# Patient Record
Sex: FEMALE | Race: WHITE | NOT HISPANIC OR LATINO | Employment: FULL TIME | URBAN - METROPOLITAN AREA
[De-identification: names, ages, dates, MRNs, and addresses within clinical notes are randomized per-mention and may not be internally consistent; named-entity substitution may affect disease eponyms.]

---

## 2018-02-28 ENCOUNTER — CLINICAL SUPPORT (OUTPATIENT)
Dept: OTHER | Facility: CLINIC | Age: 38
End: 2018-02-28
Payer: COMMERCIAL

## 2018-02-28 DIAGNOSIS — Z00.8 HEALTH EXAMINATION IN POPULATION SURVEYS: Primary | ICD-10-CM

## 2018-02-28 PROCEDURE — 99401 PREV MED CNSL INDIV APPRX 15: CPT | Mod: S$GLB,,, | Performed by: INTERNAL MEDICINE

## 2018-02-28 PROCEDURE — 80061 LIPID PANEL: CPT | Mod: QW,S$GLB,, | Performed by: INTERNAL MEDICINE

## 2018-02-28 PROCEDURE — 82947 ASSAY GLUCOSE BLOOD QUANT: CPT | Mod: QW,S$GLB,, | Performed by: INTERNAL MEDICINE

## 2018-03-01 VITALS
HEIGHT: 66 IN | BODY MASS INDEX: 30.86 KG/M2 | DIASTOLIC BLOOD PRESSURE: 92 MMHG | WEIGHT: 192 LBS | SYSTOLIC BLOOD PRESSURE: 133 MMHG

## 2018-03-01 LAB
GLUCOSE SERPL-MCNC: NORMAL MG/DL (ref 60–140)
POC CHOLESTEROL, HDL: 55 MG/DL (ref 40–?)
POC CHOLESTEROL, LDL: 152 MG/DL (ref ?–160)
POC CHOLESTEROL, TOTAL: 238 MG/DL (ref ?–240)
POC GLUCOSE FASTING: 95 MG/DL (ref 60–110)
POC TOTAL CHOLESTEROL / HDL RATIO: 4.33 (ref ?–6)
POC TRIGLYCERIDES: 153 MG/DL (ref ?–160)

## 2018-04-15 ENCOUNTER — PATIENT MESSAGE (OUTPATIENT)
Dept: INTERNAL MEDICINE | Facility: CLINIC | Age: 38
End: 2018-04-15

## 2018-04-16 ENCOUNTER — OFFICE VISIT (OUTPATIENT)
Dept: INTERNAL MEDICINE | Facility: CLINIC | Age: 38
End: 2018-04-16
Payer: COMMERCIAL

## 2018-04-16 ENCOUNTER — HOSPITAL ENCOUNTER (OUTPATIENT)
Dept: RADIOLOGY | Facility: HOSPITAL | Age: 38
Discharge: HOME | End: 2018-04-16
Attending: PHYSICIAN ASSISTANT
Payer: COMMERCIAL

## 2018-04-16 VITALS
OXYGEN SATURATION: 98 % | HEART RATE: 96 BPM | DIASTOLIC BLOOD PRESSURE: 80 MMHG | SYSTOLIC BLOOD PRESSURE: 106 MMHG | WEIGHT: 187.25 LBS | HEIGHT: 65 IN | BODY MASS INDEX: 31.2 KG/M2 | RESPIRATION RATE: 16 BRPM | TEMPERATURE: 99 F

## 2018-04-16 DIAGNOSIS — M75.21 BICEPS TENDONITIS ON RIGHT: ICD-10-CM

## 2018-04-16 DIAGNOSIS — M75.21 BICEPS TENDONITIS ON RIGHT: Primary | ICD-10-CM

## 2018-04-16 DIAGNOSIS — F41.8 MIXED ANXIETY DEPRESSIVE DISORDER: ICD-10-CM

## 2018-04-16 PROBLEM — E66.811 OBESITY, CLASS I, BMI 30-34.9: Status: ACTIVE | Noted: 2018-04-16

## 2018-04-16 PROBLEM — E66.9 OBESITY, CLASS I, BMI 30-34.9: Status: ACTIVE | Noted: 2018-04-16

## 2018-04-16 PROCEDURE — 99999 PR PBB SHADOW E&M-EST. PATIENT-LVL IV: CPT | Mod: PBBFAC,,, | Performed by: PHYSICIAN ASSISTANT

## 2018-04-16 PROCEDURE — 73030 X-RAY EXAM OF SHOULDER: CPT | Mod: 26,RT,, | Performed by: RADIOLOGY

## 2018-04-16 PROCEDURE — 73030 X-RAY EXAM OF SHOULDER: CPT | Mod: TC,FY,PO,RT

## 2018-04-16 PROCEDURE — 99203 OFFICE O/P NEW LOW 30 MIN: CPT | Mod: S$GLB,,, | Performed by: PHYSICIAN ASSISTANT

## 2018-04-16 RX ORDER — MELOXICAM 15 MG/1
15 TABLET ORAL DAILY
Qty: 20 TABLET | Refills: 3 | Status: SHIPPED | OUTPATIENT
Start: 2018-04-16 | End: 2018-05-31

## 2018-04-16 RX ORDER — VENLAFAXINE HYDROCHLORIDE 37.5 MG/1
37.5 CAPSULE, EXTENDED RELEASE ORAL DAILY
Qty: 30 CAPSULE | Refills: 1 | Status: SHIPPED | OUTPATIENT
Start: 2018-04-16 | End: 2018-06-01

## 2018-04-16 RX ORDER — VENLAFAXINE HYDROCHLORIDE 37.5 MG/1
1 CAPSULE, EXTENDED RELEASE ORAL DAILY
Refills: 3 | COMMUNITY
Start: 2018-02-03 | End: 2018-04-16 | Stop reason: SDUPTHER

## 2018-04-16 RX ORDER — VENLAFAXINE HYDROCHLORIDE 150 MG/1
150 CAPSULE, EXTENDED RELEASE ORAL DAILY
Refills: 1 | COMMUNITY
Start: 2018-02-19 | End: 2018-04-16 | Stop reason: SDUPTHER

## 2018-04-16 RX ORDER — IBUPROFEN 200 MG
200 TABLET ORAL EVERY 6 HOURS PRN
COMMUNITY
End: 2018-04-16 | Stop reason: ALTCHOICE

## 2018-04-16 RX ORDER — VENLAFAXINE HYDROCHLORIDE 150 MG/1
150 CAPSULE, EXTENDED RELEASE ORAL DAILY
Qty: 30 CAPSULE | Refills: 1 | Status: SHIPPED | OUTPATIENT
Start: 2018-04-16 | End: 2018-05-31 | Stop reason: SDUPTHER

## 2018-04-16 NOTE — PROGRESS NOTES
Subjective:       Patient ID: Francis Stafford is a 37 y.o.W/ female.    Chief Complaint: Shoulder Pain (Bl, R worse) and Medication Refill    HPI         She comes in by herself today and has the above problem.  She has not been able to raise her right arm offer side for about 3 days.  She has been having problems with her right shoulder now since before November.  In December she went skiing and slipped and hurt her right shoulder.  She never saw anybody in urgent care or ER because of this.  It seems to hurt in the anterior portion of her shoulder.  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  She is hoping that if she takes 1 or 2 days off from work and rests her arm the pain will go away.  She's had no paresthesias or muscle fasciculations to her right upper extremity at all.  There have been no problems with her neck.  There is no grating or crepitance noted when she tries to move her right upper arm and shoulder.  There has been no swelling or redness or increased heat to the right shoulder.        She has tried anywhere from 1-4 OTC ibuprofen at a time and they just don't seem to be helping that much.  She has not applied any ice or heat to the shoulder.  She does not carry any heavy weights such as children on her side or heavy handbags.    Review of Systems    Otherwise negative concerning the ORTHOPEDIC, MUSCULOSKELETAL, RHEUMATOLOGIC system review.    Objective:      Physical Exam    She preferred keeping her arm underside and just barely raised her forearm enough to shake hands on my entry to the room.  SHOULDERS: The left shoulder has FROM and no limitations whatsoever.  She also has no palpable pain in the posterior, lateral, or anterior compartments of the shoulder.  There is no pain with biceps pluck.  On the right shoulder, she can raise her arm about 80% overhead by using her left arm to raise it.  Pluck of the biceps  is extremely painful about a 10/10 level of pain.  She can't put it behind her back in all because it hurts too much in the front.  Internal/external rotation is basically pain-free of the humeral head.  Trying to get her to put her hand behind her back and reach up to her shoulder blades is impossible.  She also cannot reach across even as high as breast level to try and reach her shoulder blade.   strength is strong and equal.  Her radial pulse is strong and equal at 2+.  Color and warmth of the extremities normal.    Assessment:       1. Biceps tendonitis on right    2. Mixed anxiety depressive disorder        Plan:     1.  Info sheet concerning right shoulder pain was given to the patient with home physical therapy outlined on the paper.  2.  Start Mobic 15 mg daily with food and discontinue the ibuprofen.  3.  X-ray right shoulder.  4.  Recheck in 7 days if no significant improvement.  We may need to have her go see orthopedics or physical therapy.

## 2018-04-19 ENCOUNTER — TELEPHONE (OUTPATIENT)
Dept: INTERNAL MEDICINE | Facility: CLINIC | Age: 38
End: 2018-04-19

## 2018-04-19 DIAGNOSIS — R93.6 ABNORMAL X-RAY OF EXTREMITY: ICD-10-CM

## 2018-04-19 DIAGNOSIS — M75.21 BICEPS TENDINITIS ON RIGHT: Primary | ICD-10-CM

## 2018-04-19 NOTE — TELEPHONE ENCOUNTER
Ref to Ortho pended for provider signature, appt sched TOMORROW 04/20/18 (see result note documentation, xray 04/16/18).

## 2018-04-19 NOTE — TELEPHONE ENCOUNTER
----- Message from Leslye Peralta MA sent at 4/17/2018  2:19 PM CDT -----  Contact: Primary Care  See below   ----- Message -----  From: Mg Seo MD  Sent: 4/17/2018   2:13 PM  To: Leslye Peralta MA    Sure we should see her    ----- Message -----  From: Leslye Peralta MA  Sent: 4/17/2018  11:19 AM  To: Mg Seo MD    Advise...    -AS  ----- Message -----  From: Lizeth Major MA  Sent: 4/17/2018   9:10 AM  To: Gabbi Holliday Staff    (Pt never seen by Ortho) Per NOLA Stubbs, could you have your Ortho provider take a look at pt's 04/16/18 xray and his visit note, and based on those results would they recommend that she sees an Ortho provider?      Thank you,    Lizeth Banuelos

## 2018-04-20 ENCOUNTER — OFFICE VISIT (OUTPATIENT)
Dept: ORTHOPEDICS | Facility: CLINIC | Age: 38
End: 2018-04-20
Payer: COMMERCIAL

## 2018-04-20 VITALS
DIASTOLIC BLOOD PRESSURE: 75 MMHG | SYSTOLIC BLOOD PRESSURE: 117 MMHG | HEART RATE: 82 BPM | BODY MASS INDEX: 32.29 KG/M2 | RESPIRATION RATE: 12 BRPM | WEIGHT: 189.13 LBS | HEIGHT: 64 IN

## 2018-04-20 DIAGNOSIS — M75.31 CALCIFIC TENDINITIS OF RIGHT SHOULDER: Primary | ICD-10-CM

## 2018-04-20 DIAGNOSIS — M25.511 ACUTE PAIN OF RIGHT SHOULDER: ICD-10-CM

## 2018-04-20 DIAGNOSIS — M75.21 BICEPS TENDINITIS OF RIGHT SHOULDER: ICD-10-CM

## 2018-04-20 DIAGNOSIS — M19.011 DJD OF RIGHT AC (ACROMIOCLAVICULAR) JOINT: ICD-10-CM

## 2018-04-20 PROCEDURE — 99203 OFFICE O/P NEW LOW 30 MIN: CPT | Mod: S$GLB,,, | Performed by: PHYSICIAN ASSISTANT

## 2018-04-20 PROCEDURE — 99999 PR PBB SHADOW E&M-EST. PATIENT-LVL III: CPT | Mod: PBBFAC,,, | Performed by: PHYSICIAN ASSISTANT

## 2018-04-20 NOTE — PROGRESS NOTES
Subjective:      Patient ID: Francis Stafford is a 37 y.o. female.    Chief Complaint: Pain of the Right Shoulder      HPI: Francis Stafford  is a 37 y.o. female who c/o Pain of the Right Shoulder   for duration of a year.  She denies a particular inciting injury, but goes on to tell me that she had a fall on her ski trip in December.  She had intermittent pain in the right shoulder prior to that, so she doesn't attribute her pain now to the fall in December.  She was particularly worse this past week.  Pain level presently is 3 out of 10.  It is intermittent and sharp in quality.  Alleviating factors include meloxicam.  She complains of associated stiffness.  Worsened with overhead motion.  She denies associated numbness and tingling.  She points anteriorly as to where the pain is located.    Review of Systems   Constitution: Negative for fever.   Cardiovascular: Negative for chest pain.   Respiratory: Negative for cough and shortness of breath.    Skin: Negative for rash.   Musculoskeletal: Positive for joint pain and stiffness. Negative for joint swelling.   Gastrointestinal: Negative for heartburn.   Neurological: Negative for headaches and numbness.         Objective:        General    Nursing note and vitals reviewed.  Constitutional: She is oriented to person, place, and time. She appears well-developed and well-nourished.   HENT:   Head: Normocephalic and atraumatic.   Eyes: EOM are normal.   Cardiovascular: Normal rate and regular rhythm.    Pulmonary/Chest: Effort normal.   Abdominal: Soft.   Neurological: She is alert and oriented to person, place, and time.   Psychiatric: She has a normal mood and affect. Her behavior is normal.         Right Shoulder Exam     Inspection/Observation   Swelling: absent  Bruising: absent  Scars: absent  Deformity: absent    Tenderness   The patient is tender to palpation of the biceps tendon.    Range of Motion   Active Abduction: normal   Passive Abduction: normal   Extension:  normal   Forward Flexion: abnormal   Forward Elevation: normal  Adduction: normal  External Rotation 0 degrees: normal   Internal Rotation 0 degrees: normal     Tests & Signs   Cross Arm: positive  Drop Arm: negative  Hawkin's test: negative  Impingement: negative  Rotator Cuff Painful Arc/Range: mild    Other   Sensation: normal    Muscle Strength   Right Upper Extremity   Shoulder Abduction: 5/5   Shoulder Internal Rotation: 5/5   Shoulder External Rotation: 5/5   Subscapularis: 5/5/5   Biceps: 5/5/5     Vascular Exam     Right Pulses      Radial:                    2+      Capillary Refill  Right Hand: normal capillary refill            Xray:   Right shoulder from 4/16/18 images and report were reviewed today.  I agree with the radiologist's interpretation.  No acute fracture or dislocation.  No significant AC joint arthropathy.  Prominent calcifications noted in the expected location of the rotator cuff most consistent with calcium hydroxyapatite deposition.    Assessment:       Encounter Diagnoses   Name Primary?    Calcific tendinitis of right shoulder Yes    Biceps tendinitis of right shoulder     Acute pain of right shoulder     DJD of right AC (acromioclavicular) joint           Plan:       Francis was seen today for pain.    Diagnoses and all orders for this visit:    Calcific tendinitis of right shoulder  -     Ambulatory Referral to Physical/Occupational Therapy    Biceps tendinitis of right shoulder  -     Ambulatory Referral to Physical/Occupational Therapy    Acute pain of right shoulder  -     Ambulatory Referral to Physical/Occupational Therapy    DJD of right AC (acromioclavicular) joint  -     Ambulatory Referral to Physical/Occupational Therapy     Francis comes in today for new problem.  She is a new patient.  She has calcific tendinitis and biceps tendinitis of the right shoulder them: However, she is much improved from earlier this week.  She'll continue taking meloxicam as needed.  I have  also given her an order to do several weeks of physical therapy.  Her pain level is 3 out of 10 today.  She is much improved, so I do not think she should have an injection.  I will see her back in the office in 6 weeks to reevaluate her progress.  She verbalizes understanding and agrees.    Follow-up in about 6 weeks (around 6/1/2018).          The patient understands, chooses and consents to this plan and accepts all   the risks which include but are not limited to the risks mentioned above.     Disclaimer: This note was prepared using a voice recognition system and is likely to have sound alike errors within the text.

## 2018-04-30 ENCOUNTER — CLINICAL SUPPORT (OUTPATIENT)
Dept: REHABILITATION | Facility: HOSPITAL | Age: 38
End: 2018-04-30
Attending: PHYSICIAN ASSISTANT
Payer: COMMERCIAL

## 2018-04-30 DIAGNOSIS — M19.011 ARTHRITIS OF RIGHT SHOULDER REGION: ICD-10-CM

## 2018-04-30 DIAGNOSIS — M75.31 CALCIFIC TENDINITIS OF RIGHT SHOULDER: Primary | ICD-10-CM

## 2018-04-30 DIAGNOSIS — M25.511 CHRONIC RIGHT SHOULDER PAIN: ICD-10-CM

## 2018-04-30 DIAGNOSIS — G89.29 CHRONIC RIGHT SHOULDER PAIN: ICD-10-CM

## 2018-04-30 DIAGNOSIS — M75.21 BICIPITAL TENDINITIS OF RIGHT SHOULDER: ICD-10-CM

## 2018-04-30 PROCEDURE — 97014 ELECTRIC STIMULATION THERAPY: CPT | Mod: PO | Performed by: OCCUPATIONAL THERAPIST

## 2018-04-30 PROCEDURE — 97165 OT EVAL LOW COMPLEX 30 MIN: CPT | Mod: PO | Performed by: OCCUPATIONAL THERAPIST

## 2018-04-30 PROCEDURE — 97110 THERAPEUTIC EXERCISES: CPT | Mod: PO | Performed by: OCCUPATIONAL THERAPIST

## 2018-04-30 PROCEDURE — 97140 MANUAL THERAPY 1/> REGIONS: CPT | Mod: PO | Performed by: OCCUPATIONAL THERAPIST

## 2018-04-30 PROCEDURE — 97035 APP MDLTY 1+ULTRASOUND EA 15: CPT | Mod: PO | Performed by: OCCUPATIONAL THERAPIST

## 2018-04-30 NOTE — PROGRESS NOTES
Patient: Francis Stafford   Clinic #: [id number]  Date of evaluation: 2018     Referring Physician: Faith Camacho,*  Diagnosis: Right shoulder Calcific tendonitis and Bicep tendonites  Referral Orders: Eval and Treat 2x week for 30 days for ROM,rotator cuff strength, Modals, Progress to HEP when pt.ready.Pt.orders  on May 30, 2018.  M75.31 (ICD-10-CM) - Calcific tendinitis of right shoulder   M75.21 (ICD-10-CM) - Biceps tendinitis of right shoulder   M25.511 (ICD-10-CM) - Acute pain of right shoulder   M19.011 (ICD-10-CM) - DJD of right AC (acromioclavicular) joint       Age: 37 y.o.  Sex: female          Hand dominance: Right    Time In: 2:00  Time Out: 3:15    Occupation: Computer work  Working presently: Yes  Last time worked: NA  Workmen's Compensation: No    Date of onset: Couple of years  Involved area:  right anterior shoulder  Mechanism of Injury: No Trauma    No past medical history on file.  Precautions:   Current Outpatient Prescriptions   Medication Sig    meloxicam (MOBIC) 15 MG tablet Take 1 tablet (15 mg total) by mouth once daily.    venlafaxine (EFFEXOR-XR) 150 MG Cp24 Take 1 capsule (150 mg total) by mouth once daily. One 150mg capsule + One 37.5mg capsule daily = Total 187.5mg daily.    venlafaxine (EFFEXOR-XR) 37.5 MG 24 hr capsule Take 1 capsule (37.5 mg total) by mouth once daily. One 150mg capsule + One 37.5mg capsule daily = Total 187.5mg daily.     No current facility-administered medications for this visit.      Review of patient's allergies indicates:  No Known Allergies  X-Rays/Tests: Bicep Tendonitis      Subjective: Pt.stopped taking Meloxicam after 10 days and is not using heat or ice and has no therapy in the past.  UPPER EXREMITY FUNCTIONAL SCALE: 76/80    Pain:  During no work: 0/10  While working: 3/10  Sleeping: 3/10 WHEN SHE SLEEPS ON THE RIGHT  Location of pain: ANTERIOR SHOULDER     Francis's goals for therapy are: decreased pain and long term problems with  her shoulder.      Objective:  Sensation Test: experiences occational tingling.    Observation/Inspection   Left Right   Anatomic Symmetry normal normal   Bony normal normal   Suparspinatus normal normal   Infraspinatus normal normal   Rhomboid normal normal     Observation/Inspection:  Rounded shoulders/ forward head and normal muscle tone for age/ depressed scapula      Range of Motion:   Right: limited as follows: (see measurements table below)  Left: WNL     (L) UE (R) UE     AROM AROM Norm   Shoulder Flexion   0-180   Shoulder Flexion 180 150 180   Shoulder Abduction 180 150 0-180   Shoulder Extension 50 50 0-50   Shoulder Internal Rotation 90 90 0-90   Shoulder External Rotation 90 90 0-90   Horz Shoulder Adduction 40 40 0-40     ROM Comments:   Soft end feel    Painful Arc:   Patient demonstrates no painful arc in shoulder flexion or abduction      Strength  Shoulder Flexion RUE: 4+/5   Shoulder Extension RUE: 5/5   Shoulder Abduction RUE:  5/5   Shoulder Adduction RUE:  5/5   Internal Rotation RUE: 5/5   External Rotation RUE: 5/5   Horizontal Adduction RUE: 5/5   Shoulder Flexion LUE: 4+/5   Shoulder Extension LUE: 5/5   Shoulder Abduction LUE: 5/5   Shoulder Abbduction LUE: 5/5   Internal Rotation LUE:  5/5   External Rotation LUE:  5/5+   Horizontal Adduction LUE:  5/5   Supraspinatus muscle: 5/5 +  Pull Tests:  Abduction: NT  Ext. Rotation: NT    Special Tests:  Positive: Int. Rot. and ADD. Impingement, Bicep/yergason's Test, Empty can test and Speed's Test  Negative: Apprehension and belly test      Palpation: (for pain)     Positive: Coracoid process, Infraspinatus Region, Bicipital Groove and Supraspinatus Region     Negative: Lateral Subacromial Space and AC joint    Limitations of Functional Status:   Self Care: Non reported  Work: Non reported  Leisure: Non reported    Treatment included: OT evaluation, the following exercises (HEP) were instructed and Francis was able to demonstrate them prior to  the end of the session. HEP are as follows:   US 35% 1.5 Wcm2 x8 min. To the right anterior/lateral subacromial space.  STMs/IASTM of the anterior subacromial spcae and upper ventral aspect of the right humerus.  Upper extremity stretches: 3/30 sec.  Shoulder flexion  Shoulder abduction  Supine external rotation at 90  Supine horizontal abduction    Ice pacl/EGS 20 min. To the right shoulder.    One on one exercises: 13 min.  Manual therapy: 12 min.    Assessment  This 37 y.o. female referred to Outpatient Occupational Therapy with diagnosis of Rotator cuff calcification and Biceps tendonitis presents with limitations as described in problem list. Pt.has mild limitations in her right shoulder range of motion and has rotator cuff impingement. Patient can benefit from Occupational Therapy services for posture recommendations,Ultrasound, moist heat or ice, PROM, Theraputic exercises, joint mobs, home exercise program provied with written instructions, Theraband Ex, UBE and pulley ex in order to maximize painfree functional use of  right UE. . The following goals were discussed with the patient and she is in agreement with them as to be addressed in the treatment plan.     Co-morbidities which may impact the plan of care and potentially impede patient's progress in therapy include: Chronicity of right shoulder pain    Patient's clinical presentation is  Evolving.     Complexity level is low.    Problem List:   Decreased function of Right UE, Increased pain and Difficulty sleeping    Goals to be met in 4 weeks:  1) Pt will be independent and report performing HEP to maximize (R) shoulder functional capacity.  2) Pt will increase shoulder PROM in all measured planes of motion by 10 degrees to A with daily task.  3) Pt will report use of home modalities for pain management.  4) Pt will report one degree less of pain with nonuse and with use.  5) Pt will report interrupted sleep no more than twice a night.    Khoa Blanco  to be treated by Occupational Therapy 2 times per week for 4 weeks to achieve the established goals.   Treatment to include Ultrasoud @ 3mHz, PROM Home program, Moist heat, Strengthening Theraband Ex, UBE  and E- stim as well as any other treatments deemed necessary based on the patient's needs or progress.

## 2018-05-04 ENCOUNTER — CLINICAL SUPPORT (OUTPATIENT)
Dept: REHABILITATION | Facility: HOSPITAL | Age: 38
End: 2018-05-04
Attending: PHYSICIAN ASSISTANT
Payer: COMMERCIAL

## 2018-05-04 DIAGNOSIS — M75.21 BICIPITAL TENDINITIS OF RIGHT SHOULDER: ICD-10-CM

## 2018-05-04 DIAGNOSIS — G89.29 CHRONIC RIGHT SHOULDER PAIN: ICD-10-CM

## 2018-05-04 DIAGNOSIS — M25.511 CHRONIC RIGHT SHOULDER PAIN: ICD-10-CM

## 2018-05-04 DIAGNOSIS — M75.31 CALCIFIC TENDINITIS OF RIGHT SHOULDER: Primary | ICD-10-CM

## 2018-05-04 DIAGNOSIS — M19.011 ARTHRITIS OF RIGHT SHOULDER REGION: ICD-10-CM

## 2018-05-04 PROCEDURE — 97110 THERAPEUTIC EXERCISES: CPT | Mod: PO | Performed by: OCCUPATIONAL THERAPIST

## 2018-05-04 PROCEDURE — 97014 ELECTRIC STIMULATION THERAPY: CPT | Mod: PO | Performed by: OCCUPATIONAL THERAPIST

## 2018-05-04 PROCEDURE — 97140 MANUAL THERAPY 1/> REGIONS: CPT | Mod: PO | Performed by: OCCUPATIONAL THERAPIST

## 2018-05-04 PROCEDURE — 97035 APP MDLTY 1+ULTRASOUND EA 15: CPT | Mod: PO | Performed by: OCCUPATIONAL THERAPIST

## 2018-05-04 NOTE — PROGRESS NOTES
Patient: Francis Stafford   [facility] #: [id number]  Date of evaluation: 2018     Referring Physician: Faith Camacho,*  Diagnosis: Right shoulder Calcific tendonitis and Bicep tendonites  Referral Orders: Eval and Treat 2x week for 30 days for ROM,rotator cuff strength, Modals, Progress to HEP when pt.ready.Pt.orders  on May 30, 2018.  M75.31 (ICD-10-CM) - Calcific tendinitis of right shoulder   M75.21 (ICD-10-CM) - Biceps tendinitis of right shoulder   M25.511 (ICD-10-CM) - Acute pain of right shoulder   M19.011 (ICD-10-CM) - DJD of right AC (acromioclavicular) joint       Time In: 9:30  Time Out: 10:30    X-Rays/Tests: Bicep Tendonitis      Subjective: No new complaints.  UPPER EXREMITY FUNCTIONAL SCALE: 76/80    Francis's goals for therapy are: decreased pain and long term problems with her shoulder.      Objective:  Sensation Test: experiences occational tingling.    Observation/Inspection:  Rounded shoulders/ forward head and normal muscle tone for age/ depressed scapula      Range of Motion:   Right: limited as follows: (see measurements table below)  Left: WNL     (L) UE (R) UE     AROM AROM Norm   Shoulder Flexion   0-180   Shoulder Flexion 180 150 180   Shoulder Abduction 180 150 0-180   Shoulder Extension 50 50 0-50   Shoulder Internal Rotation 90 90 0-90   Shoulder External Rotation 90 90 0-90   Horz Shoulder Adduction 40 40 0-40     ROM Comments:   Soft end feel      Strength  Shoulder Flexion RUE: 4+/5   Shoulder Extension RUE: 5/5   Shoulder Abduction RUE:  5/5   Shoulder Adduction RUE:  5/5   Internal Rotation RUE: 5/5   External Rotation RUE: 5/5   Horizontal Adduction RUE: 5/5   Shoulder Flexion LUE: 4+/5   Shoulder Extension LUE: 5/5   Shoulder Abduction LUE: 5/5   Shoulder Abbduction LUE: 5/5   Internal Rotation LUE:  5/5   External Rotation LUE:  5/5+   Horizontal Adduction LUE:  5/5   Supraspinatus muscle: 5/5 +      Special Tests:  Positive: Int. Rot. and ADD. Impingement,  Bicep/yergason's Test, Empty can test and Speed's Test  Negative: Apprehension and belly test      Palpation: (for pain)     Positive: Coracoid process, Infraspinatus Region, Bicipital Groove and Supraspinatus Region     Negative: Lateral Subacromial Space and AC joint      Treatment included:    US 35% 1.5 Wcm2 x8 min. To the right anterior/lateral subacromial space.  STMs/IASTM of the anterior subacromial spcae and upper ventral aspect of the right humerus.    Upper extremity stretches: 3/30 sec.  Shoulder flexion  Shoulder abduction  Supine external rotation at 90  Supine horizontal abduction  Shoulder extension  FIR    Ice pacl/EGS 20 min. To the right shoulder.    One on one exercises: 13 min.  Manual therapy: 12 min.    Assessment: Pt.has a muscle spasm in the right anterior deltoid and would benefit from STM and IASTM of the right UE.She is not ready for strengthening exercises at this time.      Problem List:   Decreased function of Right UE, Increased pain and Difficulty sleeping    Goals to be met in 4 weeks:  1) Pt will be independent and report performing HEP to maximize (R) shoulder functional capacity.  2) Pt will increase shoulder PROM in all measured planes of motion by 10 degrees to A with daily task.  3) Pt will report use of home modalities for pain management.  4) Pt will report one degree less of pain with nonuse and with use.  5) Pt will report interrupted sleep no more than twice a night.    Plan  Francis to be treated by Occupational Therapy 2 times per week for 4 weeks to achieve the established goals.   Treatment to include Ultrasoud @ 3mHz, PROM Home program, Moist heat, Strengthening Theraband Ex, UBE  and E- stim as well as any other treatments deemed necessary based on the patient's needs or progress.

## 2018-05-07 ENCOUNTER — CLINICAL SUPPORT (OUTPATIENT)
Dept: REHABILITATION | Facility: HOSPITAL | Age: 38
End: 2018-05-07
Attending: PHYSICIAN ASSISTANT
Payer: COMMERCIAL

## 2018-05-07 DIAGNOSIS — M75.21 BICIPITAL TENDINITIS OF RIGHT SHOULDER: ICD-10-CM

## 2018-05-07 DIAGNOSIS — M75.31 CALCIFIC TENDINITIS OF RIGHT SHOULDER: Primary | ICD-10-CM

## 2018-05-07 PROCEDURE — 97110 THERAPEUTIC EXERCISES: CPT | Mod: PO | Performed by: OCCUPATIONAL THERAPIST

## 2018-05-07 PROCEDURE — 97140 MANUAL THERAPY 1/> REGIONS: CPT | Mod: PO | Performed by: OCCUPATIONAL THERAPIST

## 2018-05-07 PROCEDURE — 97035 APP MDLTY 1+ULTRASOUND EA 15: CPT | Mod: PO | Performed by: OCCUPATIONAL THERAPIST

## 2018-05-07 NOTE — PROGRESS NOTES
Visit #3      Patient: Francis Stafford   [facility] #: [id number]  Date of evaluation: 2018     Referring Physician: Faith Camacho,*  Diagnosis: Right shoulder Calcific tendonitis and Bicep tendonites  Referral Orders: Eval and Treat 2x week for 30 days for ROM,rotator cuff strength, Modals, Progress to HEP when pt.ready.Pt.orders  on May 30, 2018.  M75.31 (ICD-10-CM) - Calcific tendinitis of right shoulder   M75.21 (ICD-10-CM) - Biceps tendinitis of right shoulder   M25.511 (ICD-10-CM) - Acute pain of right shoulder   M19.011 (ICD-10-CM) - DJD of right AC (acromioclavicular) joint       Time In: 1:05  Time Out: 2:05    X-Rays/Tests: Bicep Tendonitis    Subjective: No complaints of pain currently, but had a twinge of pain with forward reach activity.She reports stiffness.  UPPER EXREMITY FUNCTIONAL SCALE: 76/80    Francis's goals for therapy are: decreased pain and long term problems with her shoulder.      Objective:    Observation/Inspection:  Rounded shoulders/ forward head and normal muscle tone for age/ depressed scapula      Range of Motion:   Right: limited as follows: (see measurements table below)  Left: WNL     (L) UE (R) UE     AROM AROM Norm   Shoulder Flexion   0-180   Shoulder Flexion 180 150 180   Shoulder Abduction 180 150 0-180   Shoulder Extension 50 50 0-50   Shoulder Internal Rotation 90 90 0-90   Shoulder External Rotation 90 90 0-90   Horz Shoulder Adduction 40 40 0-40     ROM Comments:   Soft end feel      Strength  Shoulder Flexion RUE: 4+/5   Shoulder Extension RUE: 5/5   Shoulder Abduction RUE:  5/5   Shoulder Adduction RUE:  5/5   Internal Rotation RUE: 5/5   External Rotation RUE: 5/5   Horizontal Adduction RUE: 5/5   Shoulder Flexion LUE: 4+/5   Shoulder Extension LUE: 5/5   Shoulder Abduction LUE: 5/5   Shoulder Abbduction LUE: 5/5   Internal Rotation LUE:  5/5   External Rotation LUE:  5/5+   Horizontal Adduction LUE:  5/5   Supraspinatus muscle: 5/5 +      Special  Tests:  Positive: Int. Rot. and ADD. Impingement, Bicep/yergason's Test, Empty can test and Speed's Test  Negative: Apprehension and belly test      Palpation: (for pain)     Positive: Coracoid process, Infraspinatus Region, Bicipital Groove and Supraspinatus Region     Negative: Lateral Subacromial Space and AC joint      Treatment included:    US 35% 1.5 Wcm2 x8 min. To the right anterior/lateral subacromial space.  STMs/IASTM of the anterior subacromial spcae and upper ventral aspect of the right humerus.    Codman's Exercises:  Clockwise/counterclockwise  Shoulder flexion/extension    Pulley exercises:  3 minutes each for stretching  Shoulder flexion  Shoulder abduction    Upper extremity stretches: 3/30 sec.  Shoulder flexion  Shoulder abduction  Supine external rotation at 90  Supine horizontal abduction  Shoulder extension  FIR    Bilateral ER with yellow TB  3 sets 10    Ice pacl/EGS 20 min. To the right shoulder.    One on one exercises: 13 min.  Manual therapy: 12 min.    Assessment: Pt.is progressing in therapy.      Problem List:   Decreased function of Right UE, Increased pain and Difficulty sleeping    Goals to be met in 4 weeks:  1) Pt will be independent and report performing HEP to maximize (R) shoulder functional capacity.  2) Pt will increase shoulder PROM in all measured planes of motion by 10 degrees to A with daily task.  3) Pt will report use of home modalities for pain management.  4) Pt will report one degree less of pain with nonuse and with use.  5) Pt will report interrupted sleep no more than twice a night.    Plan  Francis to be treated by Occupational Therapy 2 times per week for 4 weeks to achieve the established goals.   Treatment to include Ultrasoud @ 3mHz, PROM Home program, Moist heat, Strengthening Theraband Ex, UBE  and E- stim as well as any other treatments deemed necessary based on the patient's needs or progress.

## 2018-05-08 ENCOUNTER — CLINICAL SUPPORT (OUTPATIENT)
Dept: REHABILITATION | Facility: HOSPITAL | Age: 38
End: 2018-05-08
Attending: PHYSICIAN ASSISTANT
Payer: COMMERCIAL

## 2018-05-08 DIAGNOSIS — M75.21 BICIPITAL TENDINITIS OF RIGHT SHOULDER: ICD-10-CM

## 2018-05-08 DIAGNOSIS — M75.31 CALCIFIC TENDINITIS OF RIGHT SHOULDER: Primary | ICD-10-CM

## 2018-05-08 PROCEDURE — 97014 ELECTRIC STIMULATION THERAPY: CPT | Mod: PO | Performed by: OCCUPATIONAL THERAPIST

## 2018-05-08 PROCEDURE — 97035 APP MDLTY 1+ULTRASOUND EA 15: CPT | Mod: PO | Performed by: OCCUPATIONAL THERAPIST

## 2018-05-08 PROCEDURE — 97110 THERAPEUTIC EXERCISES: CPT | Mod: PO | Performed by: OCCUPATIONAL THERAPIST

## 2018-05-08 NOTE — PROGRESS NOTES
Visit #4      Patient: Francis Stafford   [facility] #: [id number]  Date of evaluation: 2018     Referring Physician: Faith Camacho,*  Diagnosis: Right shoulder Calcific tendonitis and Bicep tendonites  Referral Orders: Eval and Treat 2x week for 30 days for ROM,rotator cuff strength, Modals, Progress to HEP when pt.ready.Pt.orders  on May 30, 2018.  M75.31 (ICD-10-CM) - Calcific tendinitis of right shoulder   M75.21 (ICD-10-CM) - Biceps tendinitis of right shoulder   M25.511 (ICD-10-CM) - Acute pain of right shoulder   M19.011 (ICD-10-CM) - DJD of right AC (acromioclavicular) joint       Time In: 1:46  Time Out: 2:44    X-Rays/Tests: Bicep Tendonitis    Subjective: Pt.reports performing wall stretches when she remembers.She reports occasional discomfort at night and no longer has 3/10 on 1-10 pain scale..  UPPER EXREMITY FUNCTIONAL SCALE: 76/80    Francis's goals for therapy are: decreased pain and long term problems with her shoulder.      Objective:    Observation/Inspection:  Rounded shoulders/ forward head and normal muscle tone for age/ depressed scapula      Range of Motion:   Right: limited as follows: (see measurements table below)  Left: WNL     (L) UE (R) UE     AROM AROM Norm   Shoulder Flexion   0-180   Shoulder Flexion 180 150 180   Shoulder Abduction 180 150 0-180   Shoulder Extension 50 50 0-50   Shoulder Internal Rotation 90 90 0-90   Shoulder External Rotation 90 90 0-90   Horz Shoulder Adduction 40 40 0-40     ROM Comments:   Soft end feel      Strength  Shoulder Flexion RUE: 4+/5   Shoulder Extension RUE: 5/5   Shoulder Abduction RUE:  5/5   Shoulder Adduction RUE:  5/5   Internal Rotation RUE: 5/5   External Rotation RUE: 5/5   Horizontal Adduction RUE: 5/5   Shoulder Flexion LUE: 4+/5   Shoulder Extension LUE: 5/5   Shoulder Abduction LUE: 5/5   Shoulder Abbduction LUE: 5/5   Internal Rotation LUE:  5/5   External Rotation LUE:  5/5+   Horizontal Adduction LUE:  5/5   Supraspinatus  muscle: 5/5 +      Special Tests:  Positive: Int. Rot. and ADD. Impingement, Bicep/yergason's Test, Empty can test and Speed's Test  Negative: Apprehension and belly test      Palpation: (for pain)     Positive: Coracoid process, Infraspinatus Region, Bicipital Groove and Supraspinatus Region     Negative: Lateral Subacromial Space and AC joint      Treatment included:    US 35% 1.5 Wcm2 x8 min. To the right anterior/lateral subacromial space.  STMs/IASTM of the anterior subacromial spcae and upper ventral aspect of the right humerus.    Codman's Exercises:   Clockwise/counterclockwise  Shoulder flexion/extension    Pulley exercises:  3 minutes each for stretching  Shoulder flexion  Shoulder abduction    Wall stretches: 3/30 sec.holds at home  Shoulder flexion  Shoulder abduction  ER at 90     Upper extremity stretches: 3/30 sec.  Supine horizontal abduction  Shoulder extension  FIR    Bilateral ER with yellow TB  3 sets 10  Internal rotation: yellow TB  External rotation: yellow TB 10 reps/5 sec. holds    Ice pacl/EGS 20 min. To the right shoulder.    One on one exercises: 28 min.  Manual therapy: 12 min.    Assessment: Pt.is progressing in therapy and shoulder pain is decreasing.      Problem List:   Decreased function of Right UE, Increased pain and Difficulty sleeping    Goals to be met in 4 weeks:  1) Pt will be independent and report performing HEP to maximize (R) shoulder functional capacity.  2) Pt will increase shoulder PROM in all measured planes of motion by 10 degrees to A with daily task.  3) Pt will report use of home modalities for pain management.  4) Pt will report one degree less of pain with nonuse and with use.  5) Pt will report interrupted sleep no more than twice a night. Met 05/08/2018    Plan  Francis to be treated by Occupational Therapy 2 times per week for 4 weeks to achieve the established goals.   Treatment to include Ultrasoud @ 3mHz, PROM Home program, Moist heat, Strengthening  Theraband Ex, UBE  and E- stim as well as any other treatments deemed necessary based on the patient's needs or progress.

## 2018-05-14 ENCOUNTER — CLINICAL SUPPORT (OUTPATIENT)
Dept: REHABILITATION | Facility: HOSPITAL | Age: 38
End: 2018-05-14
Attending: PHYSICIAN ASSISTANT
Payer: COMMERCIAL

## 2018-05-14 DIAGNOSIS — M75.21 BICIPITAL TENDINITIS OF RIGHT SHOULDER: ICD-10-CM

## 2018-05-14 DIAGNOSIS — M75.31 CALCIFIC TENDINITIS OF RIGHT SHOULDER: Primary | ICD-10-CM

## 2018-05-14 PROCEDURE — 97014 ELECTRIC STIMULATION THERAPY: CPT | Mod: PO | Performed by: OCCUPATIONAL THERAPIST

## 2018-05-14 PROCEDURE — 97110 THERAPEUTIC EXERCISES: CPT | Mod: PO | Performed by: OCCUPATIONAL THERAPIST

## 2018-05-14 PROCEDURE — 97140 MANUAL THERAPY 1/> REGIONS: CPT | Mod: PO | Performed by: OCCUPATIONAL THERAPIST

## 2018-05-14 PROCEDURE — 97035 APP MDLTY 1+ULTRASOUND EA 15: CPT | Mod: PO | Performed by: OCCUPATIONAL THERAPIST

## 2018-05-14 NOTE — PROGRESS NOTES
Visit #5      Patient: Francis Stafford   [facility] #: [id number]  Date of evaluation: 2018     Referring Physician: Faith Camacho,*  Diagnosis: Right shoulder Calcific tendonitis and Bicep tendonites  Referral Orders: Eval and Treat 2x week for 30 days for ROM,rotator cuff strength, Modals, Progress to HEP when pt.ready.Pt.orders  on May 30, 2018.  M75.31 (ICD-10-CM) - Calcific tendinitis of right shoulder   M75.21 (ICD-10-CM) - Biceps tendinitis of right shoulder   M25.511 (ICD-10-CM) - Acute pain of right shoulder   M19.011 (ICD-10-CM) - DJD of right AC (acromioclavicular) joint       Time In: 1:01  Time Out: 2:15    X-Rays/Tests: Bicep Tendonitis    Subjective: Pt.reports discomfort at work at a level 2/10 on a 1-10 pain scale. Pt.reports that she is not consistent with her Home stretching program.  UPPER EXREMITY FUNCTIONAL SCALE: 76/80    Francis's goals for therapy are: decreased pain and long term problems with her shoulder.      Objective:    Observation/Inspection:  Rounded shoulders/ forward head and normal muscle tone for age/ depressed scapula      Range of Motion:   Right: limited as follows: (see measurements table below)  Left: WNL     (L) UE (R) UE     AROM AROM Norm   Shoulder Flexion   0-180   Shoulder Flexion 180 150 180   Shoulder Abduction 180 150 0-180   Shoulder Extension 50 50 0-50   Shoulder Internal Rotation 90 90 0-90   Shoulder External Rotation 90 90 0-90   Horz Shoulder Adduction 40 40 0-40     ROM Comments:   Soft end feel      Strength  Shoulder Flexion RUE: 4+/5   Shoulder Extension RUE: 5/5   Shoulder Abduction RUE:  5/5   Shoulder Adduction RUE:  5/5   Internal Rotation RUE: 5/5   External Rotation RUE: 5/5   Horizontal Adduction RUE: 5/5   Shoulder Flexion LUE: 4+/5   Shoulder Extension LUE: 5/5   Shoulder Abduction LUE: 5/5   Shoulder Abbduction LUE: 5/5   Internal Rotation LUE:  5/5   External Rotation LUE:  5/5+   Horizontal Adduction LUE:  5/5   Supraspinatus  muscle: 5/5 +      Special Tests:  Positive: Int. Rot. and ADD. Impingement, Bicep/yergason's Test, Empty can test and Speed's Test      Palpation: (for pain)     Positive: Coracoid process, Infraspinatus Region, Bicipital Groove and Supraspinatus Region       Treatment included:    US 35% 1.5 Wcm2 x8 min. To the right anterior/lateral subacromial space.  STMs/IASTM of the anterior subacromial spcae and upper ventral aspect of the right humerus.    Codman's Exercises:   Clockwise/counterclockwise  Shoulder flexion/extension    Pulley exercises:  3 minutes each for stretching  Shoulder flexion  Shoulder abduction    Wall stretches: 3/30 sec.holds at home  Shoulder flexion  Shoulder abduction  ER at 90     Upper extremity stretches: 3/30 sec horizontal abduction  Shoulder extension  FIR  Bilateral horizontal abduction    Bilateral ER with yellow TB  3 sets 10 on ball for core strengthening    ISOMETRIC EXERCISES WITH YELLOW tb/10/5 second holds  Internal rotation: yellow TB  External rotation: yellow TB 10 reps/5 sec. Holds  Shoulder adduction  Shoulder abduction    Ice pacl/EGS 20 min. To the right shoulder.    One on one exercises: 28 min.  Manual therapy: 12 min.    Assessment: Pt.continues with end range right shoulder stiffness secondary to inconsistent stretching at home.      Problem List:   Decreased function of Right UE, Increased pain and Difficulty sleeping    Goals to be met in 4 weeks:  1) Pt will be independent and report performing HEP to maximize (R) shoulder functional capacity.  2) Pt will increase shoulder PROM in all measured planes of motion by 10 degrees to A with daily task.  3) Pt will report use of home modalities for pain management.  4) Pt will report one degree less of pain with nonuse and with use.  5) Pt will report interrupted sleep no more than twice a night. Met 05/08/2018    Plan  Francis to be treated by Occupational Therapy 2 times per week for 4 weeks to achieve the established  goals.   Treatment to include Ultrasoud @ 3mHz, PROM Home program, Moist heat, Strengthening Theraband Ex, UBE  and E- stim as well as any other treatments deemed necessary based on the patient's needs or progress.

## 2018-05-17 ENCOUNTER — CLINICAL SUPPORT (OUTPATIENT)
Dept: REHABILITATION | Facility: HOSPITAL | Age: 38
End: 2018-05-17
Attending: PHYSICIAN ASSISTANT
Payer: COMMERCIAL

## 2018-05-17 DIAGNOSIS — M19.011 ARTHRITIS OF RIGHT SHOULDER REGION: ICD-10-CM

## 2018-05-17 DIAGNOSIS — M75.31 CALCIFIC TENDINITIS OF RIGHT SHOULDER: Primary | ICD-10-CM

## 2018-05-17 DIAGNOSIS — M75.21 BICIPITAL TENDINITIS OF RIGHT SHOULDER: ICD-10-CM

## 2018-05-17 DIAGNOSIS — M25.511 ACUTE PAIN OF RIGHT SHOULDER: ICD-10-CM

## 2018-05-17 PROCEDURE — 97110 THERAPEUTIC EXERCISES: CPT | Mod: PO | Performed by: OCCUPATIONAL THERAPIST

## 2018-05-17 PROCEDURE — 97014 ELECTRIC STIMULATION THERAPY: CPT | Mod: PO | Performed by: OCCUPATIONAL THERAPIST

## 2018-05-17 PROCEDURE — 97140 MANUAL THERAPY 1/> REGIONS: CPT | Mod: PO | Performed by: OCCUPATIONAL THERAPIST

## 2018-05-17 PROCEDURE — 97035 APP MDLTY 1+ULTRASOUND EA 15: CPT | Mod: PO | Performed by: OCCUPATIONAL THERAPIST

## 2018-05-17 NOTE — PROGRESS NOTES
Visit #6      Patient: Francis Stafford   [facility] #: [id number]  Date of evaluation: 2018     Referring Physician: Faith Camacho,*  Diagnosis: Right shoulder Calcific tendonitis and Bicep tendonites  Referral Orders: Eval and Treat 2x week for 30 days for ROM,rotator cuff strength, Modals, Progress to HEP when pt.ready.Pt.orders  on May 30, 2018.  M75.31 (ICD-10-CM) - Calcific tendinitis of right shoulder   M75.21 (ICD-10-CM) - Biceps tendinitis of right shoulder   M25.511 (ICD-10-CM) - Acute pain of right shoulder   M19.011 (ICD-10-CM) - DJD of right AC (acromioclavicular) joint       Time In: 1:01  Time Out: 2:15    X-Rays/Tests: Bicep Tendonitis    Subjective: Pt.reports discomfort at work at a level 2/10 on a 1-10 pain scale. Pt.reports that she is not consistent with her Home stretching program.She is having difficulty adjusting her work site.  UPPER EXREMITY FUNCTIONAL SCALE: 76/80    Francis's goals for therapy are: decreased pain and long term problems with her shoulder.      Objective:    Observation/Inspection:  Rounded shoulders/ forward head and normal muscle tone for age/ depressed scapula      Range of Motion:   Right: limited as follows: (see measurements table below)  Left: WNL     (L) UE (R) UE     AROM AROM Norm   Shoulder Flexion   0-180   Shoulder Flexion 180 150 180   Shoulder Abduction 180 150 0-180   Shoulder Extension 50 50 0-50   Shoulder Internal Rotation 90 90 0-90   Shoulder External Rotation 90 90 0-90   Horz Shoulder Adduction 40 40 0-40     ROM Comments:   Soft end feel      Strength  Shoulder Flexion RUE: 4+/5   Shoulder Extension RUE: 5/5   Shoulder Abduction RUE:  5/5   Shoulder Adduction RUE:  5/5   Internal Rotation RUE: 5/5   External Rotation RUE: 5/5   Horizontal Adduction RUE: 5/5   Shoulder Flexion LUE: 4+/5   Shoulder Extension LUE: 5/5   Shoulder Abduction LUE: 5/5   Shoulder Abbduction LUE: 5/5   Internal Rotation LUE:  5/5   External Rotation LUE:  5/5+    Horizontal Adduction LUE:  5/5   Supraspinatus muscle: 5/5 +      Special Tests:  Positive: Int. Rot. and ADD. Impingement, Bicep/yergason's Test, Empty can test and Speed's Test      Palpation: (for pain)     Positive: Coracoid process, Infraspinatus Region, Bicipital Groove and Supraspinatus Region       Treatment included:    US 35% 1.5 Wcm2 x8 min. To the right anterior/lateral subacromial space.  Moist heat/EGS  STMs/IASTM of the anterior subacromial spcae and upper ventral aspect of the right humerus.    Codman's Exercises:   Clockwise/counterclockwise  Shoulder flexion/extension    Pulley exercises:  3 minutes each for stretching  Shoulder flexion  Shoulder abduction    Wall stretches: 3/30 sec.holds   Shoulder flexion  Shoulder abduction  ER at 90     Upper extremity stretches: 3/30 sec horizontal abduction  Shoulder extension  FIR  Bilateral horizontal abduction    Bilateral ER with yellow TB  3 sets 10 on ball for core strengthening    ISOMETRIC EXERCISES WITH YELLOW tb/10/5 second holds  Internal rotation: yellow TB  External rotation: yellow TB 10 reps/5 sec. Holds  Shoulder adduction  Shoulder abduction    Ice pacl/EGS 20 min. To the right shoulder.    One on one exercises: 28 min.  Manual therapy: 12 min.    Assessment: Pt.is not consistent with her Home exercise program.Recommended that she consult her ergonomic team at her work to help adjust her worksite.      Problem List:   Decreased function of Right UE, Increased pain and Difficulty sleeping    Goals to be met in 4 weeks:  1) Pt will be independent and report performing HEP to maximize (R) shoulder functional capacity.  2) Pt will increase shoulder PROM in all measured planes of motion by 10 degrees to A with daily task.  3) Pt will report use of home modalities for pain management.  4) Pt will report one degree less of pain with nonuse and with use.  5) Pt will report interrupted sleep no more than twice a night. Met  05/08/2018    Plan  Francis to be treated by Occupational Therapy 2 times per week for 4 weeks to achieve the established goals.   Treatment to include Ultrasoud @ 3mHz, PROM Home program, Moist heat, Strengthening Theraband Ex, UBE  and E- stim as well as any other treatments deemed necessary based on the patient's needs or progress.

## 2018-05-21 ENCOUNTER — CLINICAL SUPPORT (OUTPATIENT)
Dept: REHABILITATION | Facility: HOSPITAL | Age: 38
End: 2018-05-21
Attending: PHYSICIAN ASSISTANT
Payer: COMMERCIAL

## 2018-05-21 DIAGNOSIS — M75.21 BICIPITAL TENDINITIS OF RIGHT SHOULDER: ICD-10-CM

## 2018-05-21 DIAGNOSIS — M19.011 ARTHRITIS OF RIGHT SHOULDER REGION: ICD-10-CM

## 2018-05-21 DIAGNOSIS — M25.511 ACUTE PAIN OF RIGHT SHOULDER: ICD-10-CM

## 2018-05-21 DIAGNOSIS — M75.31 CALCIFIC TENDINITIS OF RIGHT SHOULDER: Primary | ICD-10-CM

## 2018-05-21 PROCEDURE — 97110 THERAPEUTIC EXERCISES: CPT | Mod: PO | Performed by: OCCUPATIONAL THERAPIST

## 2018-05-21 PROCEDURE — 97014 ELECTRIC STIMULATION THERAPY: CPT | Mod: PO | Performed by: OCCUPATIONAL THERAPIST

## 2018-05-21 NOTE — PROGRESS NOTES
Visit #7      Patient: Francis Stafford   [facility] #: [id number]  Date of evaluation: 2018     Referring Physician: Faith Camacho,*  Diagnosis: Right shoulder Calcific tendonitis and Bicep tendonites  Referral Orders: Eval and Treat 2x week for 30 days for ROM,rotator cuff strength, Modals, Progress to HEP when pt.ready.Pt.orders  on May 30, 2018.  M75.31 (ICD-10-CM) - Calcific tendinitis of right shoulder   M75.21 (ICD-10-CM) - Biceps tendinitis of right shoulder   M25.511 (ICD-10-CM) - Acute pain of right shoulder   M19.011 (ICD-10-CM) - DJD of right AC (acromioclavicular) joint       Time In: 1:29  Time Out: 2:31    X-Rays/Tests: Bicep Tendonitis    Subjective: Pt.reports she forgot to contact her ergonomic dept.to help with work site positioning.  UPPER EXREMITY FUNCTIONAL SCALE: 76/80    Francis's goals for therapy are: decreased pain and long term problems with her shoulder.      Objective:    Observation/Inspection:  Rounded shoulders/ forward head and normal muscle tone for age/ depressed scapula      Range of Motion:   Right: limited as follows: (see measurements table below)  Left: WNL     (L) UE (R) UE     AROM AROM Norm   Shoulder Flexion   0-180   Shoulder Flexion 180 155 180   Shoulder Abduction 180 155 0-180   Shoulder Extension 50 50 0-50   Shoulder Internal Rotation 90 90 0-90   Shoulder External Rotation 90 90 0-90   Horz Shoulder Adduction 40 40 0-40     ROM Comments:   Soft end feel      Strength  Shoulder Flexion RUE: 4+/5   Shoulder Extension RUE: 5/5   Shoulder Abduction RUE:  5/5   Shoulder Adduction RUE:  5/5   Internal Rotation RUE: 5/5   External Rotation RUE: 5/5   Horizontal Adduction RUE: 5/5   Shoulder Flexion LUE: 4+/5   Shoulder Extension LUE: 5/5   Shoulder Abduction LUE: 5/5   Shoulder Abbduction LUE: 5/5   Internal Rotation LUE:  5/5   External Rotation LUE:  5/5+   Horizontal Adduction LUE:  5/5   Supraspinatus muscle: 5/5 +      Special Tests:  Positive: Int. Rot.  and ADD. Impingement, Bicep/yergason's Test, Empty can test and Speed's Test      Palpation: (for pain)     Positive: Coracoid process, Infraspinatus Region, Bicipital Groove and Supraspinatus Region       Treatment included:    US 35% 1.5 Wcm2 x8 min. To the right anterior/lateral subacromial space.  Moist heat/EGS  STMs/IASTM of the anterior subacromial spcae and upper ventral aspect of the right humerus.  UBE x 3 min. 1.0 level    Codman's Exercises:   Clockwise/counterclockwise  Shoulder flexion/extension    Pulley exercises:  3 minutes each for stretching  Shoulder flexion  Shoulder abduction    Wall stretches: 3/30 sec.holds   Shoulder flexion  Shoulder abduction  ER at 90     Upper extremity stretches: 3/30 sec horizontal abduction  Shoulder extension  FIR  Bilateral horizontal abduction    Bilateral ER with yellow TB  3 sets 10 on ball for core strengthening    ISOMETRIC EXERCISES WITH YELLOW tb/10/5 second holds  Internal rotation: yellow TB  External rotation: yellow TB 10 reps/5 sec. Holds  Shoulder adduction  Shoulder abduction    Ice pack 20 min. To the right shoulder.    One on one exercises: 43 min.      Assessment: Pt.is not consistent with her Home exercise program.She does not appear to be progressing in therapy.      Problem List:   Decreased function of Right UE, Increased pain and Difficulty sleeping    Goals to be met in 4 weeks:  1) Pt will be independent and report performing HEP to maximize (R) shoulder functional capacity.  2) Pt will increase shoulder PROM in all measured planes of motion by 10 degrees to A with daily task.  3) Pt will report use of home modalities for pain management.  4) Pt will report one degree less of pain with nonuse and with use.  5) Pt will report interrupted sleep no more than twice a night. Met 05/08/2018  6) Pt.will report decreased shoulder pain at nite when she sleeps on her shoulder from a level 3/10 to a level 1/10 on a 1-10 pain scale.    Plan  Francis to be  treated by Occupational Therapy 2 times per week for 4 weeks to achieve the established goals.   Treatment to include Ultrasoud @ 3mHz, PROM Home program, Moist heat, Strengthening Theraband Ex, UBE  and E- stim as well as any other treatments deemed necessary based on the patient's needs or progress.

## 2018-05-23 ENCOUNTER — CLINICAL SUPPORT (OUTPATIENT)
Dept: REHABILITATION | Facility: HOSPITAL | Age: 38
End: 2018-05-23
Attending: PHYSICIAN ASSISTANT
Payer: COMMERCIAL

## 2018-05-23 DIAGNOSIS — M75.21 BICIPITAL TENDINITIS OF RIGHT SHOULDER: ICD-10-CM

## 2018-05-23 DIAGNOSIS — M75.31 CALCIFIC TENDINITIS OF RIGHT SHOULDER: Primary | ICD-10-CM

## 2018-05-23 PROCEDURE — 97035 APP MDLTY 1+ULTRASOUND EA 15: CPT | Mod: PO | Performed by: OCCUPATIONAL THERAPIST

## 2018-05-23 PROCEDURE — 97014 ELECTRIC STIMULATION THERAPY: CPT | Mod: PO | Performed by: OCCUPATIONAL THERAPIST

## 2018-05-23 PROCEDURE — 97110 THERAPEUTIC EXERCISES: CPT | Mod: PO | Performed by: OCCUPATIONAL THERAPIST

## 2018-05-23 NOTE — PROGRESS NOTES
Visit #8      Patient: Francis Stafford   [facility] #: [id number]  Date of evaluation: 2018     Referring Physician: Faith Camacho,*  Diagnosis: Right shoulder Calcific tendonitis and Bicep tendonites  Referral Orders: Eval and Treat 2x week for 30 days for ROM,rotator cuff strength, Modals, Progress to HEP when pt.ready.Pt.orders  on May 30, 2018.  M75.31 (ICD-10-CM) - Calcific tendinitis of right shoulder   M75.21 (ICD-10-CM) - Biceps tendinitis of right shoulder   M25.511 (ICD-10-CM) - Acute pain of right shoulder   M19.011 (ICD-10-CM) - DJD of right AC (acromioclavicular) joint       Time In: 1:01  Time Out: 2:20    X-Rays/Tests: Bicep Tendonitis    Subjective: Pt.reports that she is not performing her home exercises program.She reports that she does not feel she has made any progress in therapy.  UPPER EXREMITY FUNCTIONAL SCALE: 76/80    Francis's goals for therapy are: decreased pain and long term problems with her shoulder.      Objective:    Observation/Inspection:  Rounded shoulders/ forward head and normal muscle tone for age/ depressed scapula      Range of Motion:   Right: limited as follows: (see measurements table below)  Left: WNL     (L) UE (R) UE     AROM AROM Norm   Shoulder Flexion   0-180   Shoulder Flexion 180 155 180   Shoulder Abduction 180 155 0-180   Shoulder Extension 50 50 0-50   Shoulder Internal Rotation 90 90 0-90   Shoulder External Rotation 90 90 0-90   Horz Shoulder Adduction 40 40 0-40     ROM Comments:   Soft end feel      Strength  Shoulder Flexion RUE: 4+/5   Shoulder Extension RUE: 5/5   Shoulder Abduction RUE:  5/5   Shoulder Adduction RUE:  5/5   Internal Rotation RUE: 5/5   External Rotation RUE: 5/5   Horizontal Adduction RUE: 5/5   Shoulder Flexion LUE: 4+/5   Shoulder Extension LUE: 5/5   Shoulder Abduction LUE: 5/5   Shoulder Abbduction LUE: 5/5   Internal Rotation LUE:  5/5   External Rotation LUE:  5/5+   Horizontal Adduction LUE:  5/5   Supraspinatus  muscle: 5/5 +      Special Tests:  Positive: Int. Rot. and ADD. Impingement, Bicep/yergason's Test, Empty can test and Speed's Test      Palpation: (for pain)     Positive: Coracoid process, Infraspinatus Region, Bicipital Groove and Supraspinatus Region       Treatment included:    US 35% 1.5 Wcm2 x8 min. To the right anterior/lateral subacromial space.    STMs/IASTM of the anterior subacromial spcae and upper ventral aspect of the right humerus.Friction massage of the right bicep tendon.  UBE x 3 min. 2.0 level    Codman's Exercises:   Clockwise/counterclockwise  Shoulder flexion/extension    Pulley exercises:  3 minutes each for stretching  Shoulder flexion  Shoulder abduction    Wall stretches: 3/30 sec.holds   Shoulder flexion  Shoulder abduction  ER at 90     Upper extremity stretches: 3/30 sec horizontal abduction  Shoulder extension  FIR  Bilateral horizontal abduction    Bilateral ER with yellow TB  3 sets 10 on ball for core strengthening    ISOMETRIC EXERCISES WITH YELLOW tb/10/5 second holds  Internal rotation: yellow TB  External rotation: yellow TB 10 reps/5 sec. Holds  Shoulder adduction  Shoulder extension Yellow TB  Shoulder flexion: Yellow TB    Ice pack/EGS 20 min. To the right shoulder.    One on one exercises: 35 min.      Assessment: Pt.has pain and limitations with external rotation and end range flexion and abduction.       Problem List:   Decreased function of Right UE, Increased pain and Difficulty sleeping    Goals to be met in 4 weeks:  1) Pt will be independent and report performing HEP to maximize (R) shoulder functional capacity.  2) Pt will increase shoulder PROM in all measured planes of motion by 10 degrees to A with daily task.  3) Pt will report use of home modalities for pain management.  4) Pt will report one degree less of pain with nonuse and with use.  5) Pt will report interrupted sleep no more than twice a night. Met 05/08/2018  6) Pt.will report decreased shoulder pain at  nite when she sleeps on her shoulder from a level 3/10 to a level 1/10 on a 1-10 pain scale.    Plan  Francis to be treated by Occupational Therapy 2 times per week for 4 weeks to achieve the established goals.   Treatment to include Ultrasoud @ 3mHz, PROM Home program, Moist heat, Strengthening Theraband Ex, UBE  and E- stim as well as any other treatments deemed necessary based on the patient's needs or progress.

## 2018-05-28 ENCOUNTER — CLINICAL SUPPORT (OUTPATIENT)
Dept: REHABILITATION | Facility: HOSPITAL | Age: 38
End: 2018-05-28
Attending: PHYSICIAN ASSISTANT
Payer: COMMERCIAL

## 2018-05-28 DIAGNOSIS — M75.21 BICIPITAL TENDINITIS OF RIGHT SHOULDER: ICD-10-CM

## 2018-05-28 DIAGNOSIS — M75.31 CALCIFIC TENDINITIS OF RIGHT SHOULDER: Primary | ICD-10-CM

## 2018-05-28 PROCEDURE — 97140 MANUAL THERAPY 1/> REGIONS: CPT | Mod: PO | Performed by: OCCUPATIONAL THERAPIST

## 2018-05-28 PROCEDURE — 97110 THERAPEUTIC EXERCISES: CPT | Mod: PO | Performed by: OCCUPATIONAL THERAPIST

## 2018-05-28 NOTE — PROGRESS NOTES
Visit #9      Patient: Francis Stafford   [facility] #: [id number]  Date of evaluation: 2018     Referring Physician: Faith Camacho,*  Diagnosis: Right shoulder Calcific tendonitis and Bicep tendonites  Referral Orders: Eval and Treat 2x week for 30 days for ROM,rotator cuff strength, Modals, Progress to HEP when pt.ready.Pt.orders  on May 30, 2018.  M75.31 (ICD-10-CM) - Calcific tendinitis of right shoulder   M75.21 (ICD-10-CM) - Biceps tendinitis of right shoulder   M25.511 (ICD-10-CM) - Acute pain of right shoulder   M19.011 (ICD-10-CM) - DJD of right AC (acromioclavicular) joint       Time In: 8:20  Time Out: 9:45    X-Rays/Tests: Bicep Tendonitis    Subjective: Pt.reports that she is stretching more at home and her range of motion has improved. Pt.reports that has raised her chair to improve upper extremity positioning at work.  UPPER EXREMITY FUNCTIONAL SCALE: 76/80    Francis's goals for therapy are: decreased pain and long term problems with her shoulder.      Objective:    Observation/Inspection:  Rounded shoulders/ forward head and normal muscle tone for age/ depressed scapula      Range of Motion:   Right: limited as follows: (see measurements table below)  Left: WNL     (L) UE (R) UE     AROM AROM Norm   Shoulder Flexion   0-180   Shoulder Flexion 180 155 180   Shoulder Abduction 180 155 0-180   Shoulder Extension 50 50 0-50   Shoulder Internal Rotation 90 90 0-90   Shoulder External Rotation 90 90 0-90   Horz Shoulder Adduction 40 40 0-40     ROM Comments:   Soft end feel      Strength  Shoulder Flexion RUE: 4+/5   Shoulder Extension RUE: 5/5   Shoulder Abduction RUE:  5/5   Shoulder Adduction RUE:  5/5   Internal Rotation RUE: 5/5   External Rotation RUE: 5/5   Horizontal Adduction RUE: 5/5   Shoulder Flexion LUE: 4+/5   Shoulder Extension LUE: 5/5   Shoulder Abduction LUE: 5/5   Shoulder Abbduction LUE: 5/5   Internal Rotation LUE:  5/5   External Rotation LUE:  5/5+   Horizontal  Adduction LUE:  5/5   Supraspinatus muscle: 5/5 +      Special Tests:  Positive: Int. Rot. and ADD. Impingement, Bicep/yergason's Test, Empty can test and Speed's Test      Palpation: (for pain)     Positive: Coracoid process, Infraspinatus Region, Bicipital Groove and Supraspinatus Region       Treatment included:    US 35% 1.5 Wcm2 x8 min. To the right anterior/lateral subacromial space.    STMs/IASTM of the anterior subacromial spcae and upper ventral aspect of the right humerus.Friction massage of the right bicep tendon.  UBE x 5 min. 3.0 level    Codman's Exercises: To be performed at home  Clockwise/counterclockwise  Shoulder flexion/extension    Pulley exercises:  3 minutes each for stretching  Shoulder flexion  Shoulder abduction    Wall stretches: 3/30 sec.holds   Shoulder flexion  Shoulder abduction  ER at 90     Upper extremity stretches: 3/30 sec horizontal abduction  Shoulder extension  FIR  Bilateral horizontal abduction    Bilateral ER with yellow TB  3 sets 10 on ball for core strengthening    ISOMETRIC EXERCISES WITH YELLOW tb/10/5 second holds  Internal rotation: yellow TB  External rotation: yellow TB 10 reps/5 sec. Holds  Shoulder adduction  Shoulder extension Yellow TB  Shoulder flexion: Yellow TB    Ice pack/EGS 20 min. To the right shoulder.    One on one exercises: 35 min.      Assessment: Pt.has increased tolerance for stretches and strengthening exercises today. Discussed ergonomic work site positioning at length again today to include keyboard mouse positioning.      Problem List:   Decreased function of Right UE, Increased pain and Difficulty sleeping    Goals to be met in 4 weeks:  1) Pt will be independent and report performing HEP to maximize (R) shoulder functional capacity.  2) Pt will increase shoulder PROM in all measured planes of motion by 10 degrees to A with daily task.  3) Pt will report use of home modalities for pain management.  4) Pt will report one degree less of pain  with nonuse and with use.  5) Pt will report interrupted sleep no more than twice a night. Met 05/08/2018  6) Pt.will report decreased shoulder pain at nite when she sleeps on her shoulder from a level 3/10 to a level 1/10 on a 1-10 pain scale.    Plan  Francis to be treated by Occupational Therapy 2 times per week for 4 weeks to achieve the established goals.   Treatment to include Ultrasoud @ 3mHz, PROM Home program, Moist heat, Strengthening Theraband Ex, UBE  and E- stim as well as any other treatments deemed necessary based on the patient's needs or progress.

## 2018-05-30 ENCOUNTER — CLINICAL SUPPORT (OUTPATIENT)
Dept: REHABILITATION | Facility: HOSPITAL | Age: 38
End: 2018-05-30
Attending: PHYSICIAN ASSISTANT
Payer: COMMERCIAL

## 2018-05-30 DIAGNOSIS — M75.21 BICIPITAL TENDINITIS OF RIGHT SHOULDER: ICD-10-CM

## 2018-05-30 DIAGNOSIS — M75.31 CALCIFIC TENDINITIS OF RIGHT SHOULDER: Primary | ICD-10-CM

## 2018-05-30 PROCEDURE — 97110 THERAPEUTIC EXERCISES: CPT | Mod: PO | Performed by: OCCUPATIONAL THERAPIST

## 2018-05-30 PROCEDURE — 97014 ELECTRIC STIMULATION THERAPY: CPT | Mod: PO | Performed by: OCCUPATIONAL THERAPIST

## 2018-05-30 PROCEDURE — 97035 APP MDLTY 1+ULTRASOUND EA 15: CPT | Mod: PO | Performed by: OCCUPATIONAL THERAPIST

## 2018-05-31 ENCOUNTER — LAB VISIT (OUTPATIENT)
Dept: LAB | Facility: HOSPITAL | Age: 38
End: 2018-05-31
Attending: INTERNAL MEDICINE
Payer: COMMERCIAL

## 2018-05-31 ENCOUNTER — OFFICE VISIT (OUTPATIENT)
Dept: ORTHOPEDICS | Facility: CLINIC | Age: 38
End: 2018-05-31
Payer: COMMERCIAL

## 2018-05-31 ENCOUNTER — OFFICE VISIT (OUTPATIENT)
Dept: INTERNAL MEDICINE | Facility: CLINIC | Age: 38
End: 2018-05-31
Payer: COMMERCIAL

## 2018-05-31 VITALS
HEART RATE: 78 BPM | DIASTOLIC BLOOD PRESSURE: 83 MMHG | SYSTOLIC BLOOD PRESSURE: 118 MMHG | HEIGHT: 64 IN | WEIGHT: 185 LBS | BODY MASS INDEX: 31.58 KG/M2

## 2018-05-31 VITALS
DIASTOLIC BLOOD PRESSURE: 88 MMHG | SYSTOLIC BLOOD PRESSURE: 120 MMHG | HEART RATE: 86 BPM | WEIGHT: 183.88 LBS | BODY MASS INDEX: 29.55 KG/M2 | OXYGEN SATURATION: 99 % | TEMPERATURE: 97 F | HEIGHT: 66 IN

## 2018-05-31 DIAGNOSIS — M19.011 DJD OF RIGHT AC (ACROMIOCLAVICULAR) JOINT: ICD-10-CM

## 2018-05-31 DIAGNOSIS — F41.9 ANXIETY AND DEPRESSION: ICD-10-CM

## 2018-05-31 DIAGNOSIS — M25.511 ACUTE PAIN OF RIGHT SHOULDER: ICD-10-CM

## 2018-05-31 DIAGNOSIS — Z00.00 ENCOUNTER FOR PREVENTIVE HEALTH EXAMINATION: ICD-10-CM

## 2018-05-31 DIAGNOSIS — F41.8 MIXED ANXIETY DEPRESSIVE DISORDER: ICD-10-CM

## 2018-05-31 DIAGNOSIS — R53.81 MALAISE AND FATIGUE: ICD-10-CM

## 2018-05-31 DIAGNOSIS — R53.83 MALAISE AND FATIGUE: ICD-10-CM

## 2018-05-31 DIAGNOSIS — M75.31 CALCIFIC TENDINITIS OF RIGHT SHOULDER: Primary | ICD-10-CM

## 2018-05-31 DIAGNOSIS — Z00.00 ENCOUNTER FOR PREVENTIVE HEALTH EXAMINATION: Primary | ICD-10-CM

## 2018-05-31 DIAGNOSIS — R51.9 HEADACHE, UNSPECIFIED HEADACHE TYPE: ICD-10-CM

## 2018-05-31 DIAGNOSIS — F32.A ANXIETY AND DEPRESSION: ICD-10-CM

## 2018-05-31 DIAGNOSIS — M75.21 BICEPS TENDINITIS OF RIGHT SHOULDER: ICD-10-CM

## 2018-05-31 LAB
25(OH)D3+25(OH)D2 SERPL-MCNC: 16 NG/ML
ALBUMIN SERPL BCP-MCNC: 4.1 G/DL
ALP SERPL-CCNC: 57 U/L
ALT SERPL W/O P-5'-P-CCNC: 11 U/L
ANION GAP SERPL CALC-SCNC: 8 MMOL/L
AST SERPL-CCNC: 19 U/L
BASOPHILS # BLD AUTO: 0.02 K/UL
BASOPHILS NFR BLD: 0.3 %
BILIRUB SERPL-MCNC: 0.4 MG/DL
BUN SERPL-MCNC: 14 MG/DL
CALCIUM SERPL-MCNC: 9.6 MG/DL
CHLORIDE SERPL-SCNC: 104 MMOL/L
CHOLEST SERPL-MCNC: 238 MG/DL
CHOLEST/HDLC SERPL: 4.6 {RATIO}
CO2 SERPL-SCNC: 29 MMOL/L
CREAT SERPL-MCNC: 0.8 MG/DL
DIFFERENTIAL METHOD: NORMAL
EOSINOPHIL # BLD AUTO: 0.1 K/UL
EOSINOPHIL NFR BLD: 1.3 %
ERYTHROCYTE [DISTWIDTH] IN BLOOD BY AUTOMATED COUNT: 12.5 %
EST. GFR  (AFRICAN AMERICAN): >60 ML/MIN/1.73 M^2
EST. GFR  (NON AFRICAN AMERICAN): >60 ML/MIN/1.73 M^2
GLUCOSE SERPL-MCNC: 93 MG/DL
HCT VFR BLD AUTO: 38.3 %
HDLC SERPL-MCNC: 52 MG/DL
HDLC SERPL: 21.8 %
HGB BLD-MCNC: 12.3 G/DL
IMM GRANULOCYTES # BLD AUTO: 0.01 K/UL
IMM GRANULOCYTES NFR BLD AUTO: 0.1 %
LDLC SERPL CALC-MCNC: 161.8 MG/DL
LYMPHOCYTES # BLD AUTO: 2.5 K/UL
LYMPHOCYTES NFR BLD: 32.5 %
MCH RBC QN AUTO: 29.6 PG
MCHC RBC AUTO-ENTMCNC: 32.1 G/DL
MCV RBC AUTO: 92 FL
MONOCYTES # BLD AUTO: 0.6 K/UL
MONOCYTES NFR BLD: 7.2 %
NEUTROPHILS # BLD AUTO: 4.4 K/UL
NEUTROPHILS NFR BLD: 58.6 %
NONHDLC SERPL-MCNC: 186 MG/DL
NRBC BLD-RTO: 0 /100 WBC
PLATELET # BLD AUTO: 258 K/UL
PMV BLD AUTO: 11.7 FL
POTASSIUM SERPL-SCNC: 4.1 MMOL/L
PROT SERPL-MCNC: 7.9 G/DL
RBC # BLD AUTO: 4.16 M/UL
SODIUM SERPL-SCNC: 141 MMOL/L
TRIGL SERPL-MCNC: 121 MG/DL
TSH SERPL DL<=0.005 MIU/L-ACNC: 1.75 UIU/ML
WBC # BLD AUTO: 7.59 K/UL

## 2018-05-31 PROCEDURE — 99213 OFFICE O/P EST LOW 20 MIN: CPT | Mod: S$GLB,,, | Performed by: PHYSICIAN ASSISTANT

## 2018-05-31 PROCEDURE — 90715 TDAP VACCINE 7 YRS/> IM: CPT | Mod: S$GLB,,, | Performed by: INTERNAL MEDICINE

## 2018-05-31 PROCEDURE — 36415 COLL VENOUS BLD VENIPUNCTURE: CPT | Mod: PO

## 2018-05-31 PROCEDURE — 3008F BODY MASS INDEX DOCD: CPT | Mod: CPTII,S$GLB,, | Performed by: PHYSICIAN ASSISTANT

## 2018-05-31 PROCEDURE — 80061 LIPID PANEL: CPT

## 2018-05-31 PROCEDURE — 84443 ASSAY THYROID STIM HORMONE: CPT

## 2018-05-31 PROCEDURE — 99999 PR PBB SHADOW E&M-EST. PATIENT-LVL III: CPT | Mod: PBBFAC,,, | Performed by: INTERNAL MEDICINE

## 2018-05-31 PROCEDURE — 99999 PR PBB SHADOW E&M-EST. PATIENT-LVL III: CPT | Mod: PBBFAC,,, | Performed by: PHYSICIAN ASSISTANT

## 2018-05-31 PROCEDURE — 82306 VITAMIN D 25 HYDROXY: CPT

## 2018-05-31 PROCEDURE — 80053 COMPREHEN METABOLIC PANEL: CPT

## 2018-05-31 PROCEDURE — 99395 PREV VISIT EST AGE 18-39: CPT | Mod: 25,S$GLB,, | Performed by: INTERNAL MEDICINE

## 2018-05-31 PROCEDURE — 90471 IMMUNIZATION ADMIN: CPT | Mod: S$GLB,,, | Performed by: INTERNAL MEDICINE

## 2018-05-31 PROCEDURE — 85025 COMPLETE CBC W/AUTO DIFF WBC: CPT

## 2018-05-31 RX ORDER — VENLAFAXINE HYDROCHLORIDE 75 MG/1
150 CAPSULE, EXTENDED RELEASE ORAL DAILY
Qty: 60 CAPSULE | Refills: 5 | Status: SHIPPED | OUTPATIENT
Start: 2018-05-31 | End: 2018-12-04 | Stop reason: SDUPTHER

## 2018-05-31 NOTE — PROGRESS NOTES
"Subjective:      Patient ID: Francis Stafford is a 37 y.o. female.    Chief Complaint: Establish Care      HPI  Here for establish care, and preventive exam.  Weaning off venlafaxine, now 150mg daily.  Stopped counseling last month due to thinks doing well now.  Energy level is low.  1-2x a week walks for about an hour.  No f/c/cough.  Menses regular.  Sweats during the night.  Sometimes gets a headache when laughing very hard.  No cp/sob/palp.  No paresthesia, joint/muscle pains or rash.  BMs and urine normal.  No vit D but was prescribed in past.    SH:  No tob, occas EtOH, single,  at [employer].    FH:  gmother breast CA.    HM:  5/18 today TDaP, no Gyn in 5y.    Review of Systems   Constitutional: Negative for appetite change, chills, diaphoresis and fever.   HENT: Negative for congestion, ear pain, rhinorrhea, sinus pressure and sore throat.    Respiratory: Negative for cough, chest tightness and shortness of breath.    Cardiovascular: Negative for chest pain and palpitations.   Gastrointestinal: Negative for blood in stool, constipation, diarrhea, nausea and vomiting.   Genitourinary: Negative for dysuria, frequency, hematuria, menstrual problem, urgency and vaginal discharge.   Musculoskeletal: Negative for arthralgias.   Skin: Negative for rash.   Neurological: Negative for dizziness and headaches.   Psychiatric/Behavioral: Negative for sleep disturbance. The patient is not nervous/anxious.          Objective:   /88 (BP Location: Right arm, Patient Position: Sitting)   Pulse 86   Temp 97.3 °F (36.3 °C) (Tympanic)   Ht 5' 6" (1.676 m)   Wt 83.4 kg (183 lb 13.8 oz)   SpO2 99%   BMI 29.68 kg/m²     Physical Exam   Constitutional: She is oriented to person, place, and time. She appears well-developed and well-nourished.   HENT:   Right Ear: External ear normal. Tympanic membrane is not injected.   Left Ear: External ear normal. Tympanic membrane is not injected.   Mouth/Throat: Oropharynx is " clear and moist.   Eyes: Conjunctivae are normal.   Neck: Normal range of motion. Neck supple. No thyromegaly present.   Cardiovascular: Normal rate, regular rhythm and intact distal pulses.  Exam reveals no gallop and no friction rub.    No murmur heard.  Pulmonary/Chest: Effort normal and breath sounds normal. She has no wheezes. She has no rales.   Abdominal: Soft. Bowel sounds are normal. She exhibits no mass. There is no tenderness.   Musculoskeletal: She exhibits no edema.   Lymphadenopathy:     She has no cervical adenopathy.   Neurological: She is alert and oriented to person, place, and time.   Skin: Skin is warm. No rash noted.   Psychiatric: She has a normal mood and affect.           Assessment:       1. Encounter for preventive health examination    2. Malaise and fatigue    3. Headache, unspecified headache type    4. Anxiety and depression    5. Mixed anxiety depressive disorder          Plan:     Encounter for preventive health examination  -     Tdap Vaccine  -     CBC auto differential; Future; Expected date: 05/31/2018  -     Comprehensive metabolic panel; Future; Expected date: 05/31/2018  -     Lipid panel; Future; Expected date: 05/31/2018  -     TSH; Future; Expected date: 05/31/2018  -     Vitamin D; Future    Malaise and fatigue- check labs.    Headache, unspecified headache type  -     Ambulatory referral to Neurology    Anxiety and depression- continue wean down.  -     venlafaxine (EFFEXOR-XR) 75 MG 24 hr capsule; Take 2 capsules (150 mg total) by mouth once daily. One 150mg capsule + One 37.5mg capsule daily = Total 187.5mg daily.  Dispense: 60 capsule; Refill: 5    ND results.  RTC 3mo.

## 2018-06-01 ENCOUNTER — PATIENT MESSAGE (OUTPATIENT)
Dept: INTERNAL MEDICINE | Facility: CLINIC | Age: 38
End: 2018-06-01

## 2018-06-01 ENCOUNTER — OFFICE VISIT (OUTPATIENT)
Dept: NEUROLOGY | Facility: CLINIC | Age: 38
End: 2018-06-01
Payer: COMMERCIAL

## 2018-06-01 VITALS
SYSTOLIC BLOOD PRESSURE: 120 MMHG | HEIGHT: 66 IN | WEIGHT: 184.06 LBS | RESPIRATION RATE: 20 BRPM | DIASTOLIC BLOOD PRESSURE: 80 MMHG | HEART RATE: 80 BPM | BODY MASS INDEX: 29.58 KG/M2

## 2018-06-01 DIAGNOSIS — G44.209 TENSION HEADACHE: ICD-10-CM

## 2018-06-01 DIAGNOSIS — E55.9 VITAMIN D DEFICIENCY: Primary | ICD-10-CM

## 2018-06-01 PROCEDURE — 99205 OFFICE O/P NEW HI 60 MIN: CPT | Mod: S$GLB,,, | Performed by: PSYCHIATRY & NEUROLOGY

## 2018-06-01 PROCEDURE — 99999 PR PBB SHADOW E&M-EST. PATIENT-LVL III: CPT | Mod: PBBFAC,,, | Performed by: PSYCHIATRY & NEUROLOGY

## 2018-06-01 PROCEDURE — 3008F BODY MASS INDEX DOCD: CPT | Mod: CPTII,S$GLB,, | Performed by: PSYCHIATRY & NEUROLOGY

## 2018-06-01 RX ORDER — ERGOCALCIFEROL 1.25 MG/1
50000 CAPSULE ORAL WEEKLY
Qty: 4 CAPSULE | Refills: 4 | Status: SHIPPED | OUTPATIENT
Start: 2018-06-01 | End: 2018-07-01

## 2018-06-01 NOTE — LETTER
June 4, 2018      Magnolia Boyle MD  [address] [place] [address]           [facility]  [address] [place] [address]  Phone: [phone]          Patient: Francis Stafford   MR Number: [id number]   YOB: 1980   Date of Visit: 6/1/2018       Dear Dr. Magnolia Boyle:    Thank you for referring Francis Stafford to me for evaluation. Attached you will find relevant portions of my assessment and plan of care.    If you have questions, please do not hesitate to call me. I look forward to following Francis Stafford along with you.    Sincerely,    Kacey Mahmood MD    Enclosure  CC:  No Recipients    If you would like to receive this communication electronically, please contact [email or web address] or [phone] to request more information on [employer] Link access.    For providers and/or their staff who would like to refer a patient to [facility], please contact us through our one-stop-shop provider referral line, [facility], at [phone].    If you feel you have received this communication in error or would no longer like to receive these types of communications, please e-mail [email or web address]

## 2018-06-01 NOTE — PROGRESS NOTES
Consult Requested By: No att. providers found  Reason for Consult:  Headache    SUBJECTIVE:       HPI:   Headache  She complains of a of headache. She does have a headache at this time.    Description of Headaches:  Location of pain: right-sided unilateral  Radiation of pain?:bilateral, whole head like a band.  Character of pain:throbbing  Severity of pain: 7  Accompanying symptoms: nausea, photophobia, when headache is very bad.  Prodromal sx?: none  Rapidity of onset: gradual  Typical duration of individual headache: 7 hours  Are most headaches similar in presentation? yes  Typical precipitants: stress, food (MSG), change in eating pattern, laughing, work ( pressure of work)    Temporal Pattern of Headaches:  Started having HA's 15 yeras   Worst time of day: evening, nighttime  Awaken from sleep?: no  Seasonal pattern?: no  Clustering of HA's over time? no  Overall pattern since problem began: gradually improving    Degree of Functional Impairment: no    Current Use of Meds to Treat HA:  Abortive meds? NSAIDs (ibuprofen)  Daily use? no  Prophylactic meds? none    Additional Relevant History:  History of head/neck trauma? no  History of head/neck surgery? no  Family h/o headache problems? no  Use of meds that might worsen HA's (nitrates, exogenous estrogens,    Nifedipine)? no  Exposure to carbon monoxide? no  Substance use: alcohol: occasional, illicit drugs: no, tobacco: no, caffeine: once a day      Past Medical History:   Diagnosis Date    Anxiety     Depression      Past Surgical History:   Procedure Laterality Date    WISDOM TOOTH EXTRACTION       Family History   Problem Relation Age of Onset    No Known Problems Mother     No Known Problems Father     No Known Problems Sister     No Known Problems Brother     No Known Problems Brother      Social History   Substance Use Topics    Smoking status: Former Smoker     Types: Cigarettes     Quit date: 1999    Smokeless tobacco: Never Used    Alcohol  use Yes      Comment: Occasional       Review of Systems   Constitutional: Negative for fever and weight loss.   HENT: Negative for hearing loss.    Eyes: Negative for blurred vision, double vision, photophobia and pain.   Respiratory: Negative for cough.    Cardiovascular: Negative for chest pain.   Gastrointestinal: Negative for abdominal pain, nausea and vomiting.   Genitourinary: Negative for dysuria, frequency and urgency.   Musculoskeletal: Negative.  Negative for back pain, falls, joint pain, myalgias and neck pain.   Skin: Negative for itching and rash.   Neurological: Positive for headaches. Negative for tingling.   Psychiatric/Behavioral: Negative for depression and memory loss.         OBJECTIVE:     Vital Signs (Most Recent)  Pulse: 80 (06/01/18 1011)  Resp: 20 (06/01/18 1011)  BP: 120/80 (06/01/18 1011)    Physical Exam   Constitutional: She is oriented to person, place, and time. She appears well-developed and well-nourished.   HENT:   Head: Normocephalic and atraumatic.   Eyes: Conjunctivae and EOM are normal. Pupils are equal, round, and reactive to light.   Neck: Normal range of motion. Neck supple. No JVD present. No tracheal deviation present. No thyromegaly present.   Cardiovascular: Normal rate, regular rhythm and normal heart sounds.    Pulmonary/Chest: Effort normal and breath sounds normal.   Abdominal: She exhibits no distension. There is no tenderness.   Musculoskeletal: Normal range of motion. She exhibits no edema or tenderness.   Neurological: She is alert and oriented to person, place, and time. She has normal strength and normal reflexes. She displays normal reflexes. No cranial nerve deficit or sensory deficit. She exhibits normal muscle tone. She displays a negative Romberg sign. Coordination and gait normal.   Reflex Scores:       Tricep reflexes are 2+ on the right side and 2+ on the left side.       Bicep reflexes are 2+ on the right side and 2+ on the left side.        Brachioradialis reflexes are 2+ on the right side and 2+ on the left side.       Patellar reflexes are 2+ on the right side and 2+ on the left side.       Achilles reflexes are 2+ on the right side and 2+ on the left side.  Skin: Skin is warm and dry. No rash noted.   Psychiatric: She has a normal mood and affect. Her behavior is normal. Judgment and thought content normal.       Strength  Deltoids Triceps Biceps Wrist Extension Wrist Flexion Hand    Upper: R 5/5 5/5 5/5 5/5 5/5 5/5    L 5/5 5/5 5/5 5/5 5/5 5/5     Iliopsoas Quadriceps Knee  Flexion Tibialis  anterior Gastro- cnemius EHL   Lower: R 5/5 5/5 5/5 5/5 5/5 5/5    L 5/5 5/5 5/5 5/5 5/5 5/5     Laboratory:  Lab Results   Component Value Date    WBC 7.59 05/31/2018    HGB 12.3 05/31/2018    HCT 38.3 05/31/2018     05/31/2018    CHOL 238 (H) 05/31/2018    TRIG 121 05/31/2018    HDL 52 05/31/2018    ALT 11 05/31/2018    AST 19 05/31/2018     05/31/2018    K 4.1 05/31/2018     05/31/2018    CREATININE 0.8 05/31/2018    BUN 14 05/31/2018    CO2 29 05/31/2018    TSH 1.745 05/31/2018         Diagnostic Results:      ASSESSMENT/PLAN:     Primary Diagnoses:  Headache; Possibly Tension headache. Since it is improving , will provide Headache Calendar and see her next month.    Patient Active Problem List   Diagnosis    Obesity, Class I, BMI 30-34.9    Anxiety and depression        Plan:  Time spent: 30 minutes in face to face discussion concerning diagnosis, prognosis, review of lab and test results, benefits of treatment as well as management of disease, counseling of patient and coordination of care between various health care providers . Greater than half the time spent was used for coordination of care and counseling of patient. This note may have some spelling or wording mistakes which might have been overlooked during proof reading.

## 2018-06-04 PROBLEM — G44.209 TENSION HEADACHE: Status: ACTIVE | Noted: 2018-06-04

## 2018-06-16 DIAGNOSIS — F41.8 MIXED ANXIETY DEPRESSIVE DISORDER: ICD-10-CM

## 2018-06-18 RX ORDER — VENLAFAXINE HYDROCHLORIDE 37.5 MG/1
CAPSULE, EXTENDED RELEASE ORAL
Qty: 30 CAPSULE | Refills: 6 | Status: SHIPPED | OUTPATIENT
Start: 2018-06-18 | End: 2018-10-16

## 2018-08-17 ENCOUNTER — PATIENT OUTREACH (OUTPATIENT)
Dept: ADMINISTRATIVE | Facility: HOSPITAL | Age: 38
End: 2018-08-17

## 2018-08-20 ENCOUNTER — PATIENT MESSAGE (OUTPATIENT)
Dept: ADMINISTRATIVE | Facility: HOSPITAL | Age: 38
End: 2018-08-20

## 2018-08-20 NOTE — NURSING
Patient requested lab appt change via portal as she will be leaving [facility] on 8/24. Appt rescheduled to 8/23 per patient request.

## 2018-08-21 NOTE — PROGRESS NOTES
"Subjective:      Patient ID: Francis Stafford is a 37 y.o. female.    Chief Complaint: Follow-up      HPI  Here for follow up of medical problems.  Continues to wean down on venlafaxine, currently alternating 75 and 37.5.  Taking vit D weekly, x 3mo.  Exercising 30-60min daily, cardio, gets sob with that or 3 flights going up stairs.  HAs top of head, less frequent since saw Neuro- occas ibuprofen or just goes to sleep.  No f/c/sw/cough.        Updated/ annual due 5/19:  HM:  5/18 TDaP, no Gyn in 5y.      Review of Systems   Constitutional: Negative for activity change, chills, diaphoresis, fever and unexpected weight change.   HENT: Negative for hearing loss, rhinorrhea and trouble swallowing.    Eyes: Negative for discharge and visual disturbance.   Respiratory: Negative for cough, chest tightness, shortness of breath and wheezing.    Cardiovascular: Negative for chest pain, palpitations and leg swelling.   Gastrointestinal: Positive for constipation. Negative for blood in stool, diarrhea, nausea and vomiting.   Endocrine: Positive for polyuria. Negative for polydipsia.   Genitourinary: Negative for difficulty urinating, dysuria, frequency, hematuria and menstrual problem.   Musculoskeletal: Negative for arthralgias, joint swelling and neck pain.   Neurological: Positive for weakness and headaches.   Psychiatric/Behavioral: Positive for confusion and dysphoric mood. The patient is not nervous/anxious.          Objective:   /66 (BP Location: Right arm, Patient Position: Sitting)   Pulse 80   Temp 98.6 °F (37 °C) (Tympanic)   Ht 5' 6" (1.676 m)   Wt 83.1 kg (183 lb 3.2 oz)   BMI 29.57 kg/m²     Physical Exam   Constitutional: She is oriented to person, place, and time. She appears well-developed.   HENT:   Mouth/Throat: Oropharynx is clear and moist.   Neck: Neck supple. Carotid bruit is not present. No thyroid mass present.   Cardiovascular: Normal rate, regular rhythm and intact distal pulses. Exam reveals " no gallop and no friction rub.   No murmur heard.  Pulmonary/Chest: Effort normal and breath sounds normal. She has no wheezes. She has no rales.   Abdominal: Soft. Bowel sounds are normal. She exhibits no mass. There is no hepatosplenomegaly. There is no tenderness.   Musculoskeletal: She exhibits no edema.   Lymphadenopathy:     She has no cervical adenopathy.   Neurological: She is alert and oriented to person, place, and time.   Psychiatric: She has a normal mood and affect.       Results for DANIKA NAVARRETE (MRN [id number]) as of 8/31/2018 09:26   Ref. Range 5/31/2018 10:36 8/23/2018 11:03   Vit D, 25-Hydroxy Latest Ref Range: 30 - 96 ng/mL 16 (L) 23 (L)       Assessment:       1. Anxiety and depression    2. Vitamin D deficiency    3. Tension headache    4. SOB (shortness of breath) on exertion          Plan:     Anxiety and depression- continue wean if feeling ok.    Vitamin D deficiency- add daily D3, recheck 3mo.  -     cholecalciferol, vitamin D3, 2,000 unit Cap; Take 1 capsule (2,000 Units total) by mouth once daily.  Dispense: 100 capsule; Refill: 6  -     Vitamin D; Future  -     ergocalciferol (ERGOCALCIFEROL) 50,000 unit Cap; Take 1 capsule (50,000 Units total) by mouth once a week.  Dispense: 4 capsule; Refill: 4    Tension headache- doing better.    SOB (shortness of breath) on exertion  -     X-Ray Chest PA And Lateral; Future; Expected date: 08/31/2018  -     EKG 12-lead; Future

## 2018-08-23 ENCOUNTER — LAB VISIT (OUTPATIENT)
Dept: LAB | Facility: HOSPITAL | Age: 38
End: 2018-08-23
Attending: INTERNAL MEDICINE
Payer: COMMERCIAL

## 2018-08-23 DIAGNOSIS — E55.9 VITAMIN D DEFICIENCY: ICD-10-CM

## 2018-08-23 LAB — 25(OH)D3+25(OH)D2 SERPL-MCNC: 23 NG/ML

## 2018-08-23 PROCEDURE — 36415 COLL VENOUS BLD VENIPUNCTURE: CPT | Mod: PO

## 2018-08-23 PROCEDURE — 82306 VITAMIN D 25 HYDROXY: CPT

## 2018-08-31 ENCOUNTER — CLINICAL SUPPORT (OUTPATIENT)
Dept: CARDIOLOGY | Facility: CLINIC | Age: 38
End: 2018-08-31
Payer: COMMERCIAL

## 2018-08-31 ENCOUNTER — OFFICE VISIT (OUTPATIENT)
Dept: INTERNAL MEDICINE | Facility: CLINIC | Age: 38
End: 2018-08-31
Payer: COMMERCIAL

## 2018-08-31 ENCOUNTER — HOSPITAL ENCOUNTER (OUTPATIENT)
Dept: RADIOLOGY | Facility: HOSPITAL | Age: 38
Discharge: HOME | End: 2018-08-31
Attending: INTERNAL MEDICINE
Payer: COMMERCIAL

## 2018-08-31 VITALS
TEMPERATURE: 99 F | HEART RATE: 80 BPM | DIASTOLIC BLOOD PRESSURE: 66 MMHG | BODY MASS INDEX: 29.44 KG/M2 | SYSTOLIC BLOOD PRESSURE: 118 MMHG | HEIGHT: 66 IN | WEIGHT: 183.19 LBS

## 2018-08-31 DIAGNOSIS — R06.02 SOB (SHORTNESS OF BREATH) ON EXERTION: ICD-10-CM

## 2018-08-31 DIAGNOSIS — E55.9 VITAMIN D DEFICIENCY: ICD-10-CM

## 2018-08-31 DIAGNOSIS — F32.A ANXIETY AND DEPRESSION: Primary | ICD-10-CM

## 2018-08-31 DIAGNOSIS — G44.209 TENSION HEADACHE: ICD-10-CM

## 2018-08-31 DIAGNOSIS — F41.9 ANXIETY AND DEPRESSION: Primary | ICD-10-CM

## 2018-08-31 PROCEDURE — 99999 PR PBB SHADOW E&M-EST. PATIENT-LVL III: CPT | Mod: PBBFAC,,, | Performed by: INTERNAL MEDICINE

## 2018-08-31 PROCEDURE — 71046 X-RAY EXAM CHEST 2 VIEWS: CPT | Mod: 26,,, | Performed by: RADIOLOGY

## 2018-08-31 PROCEDURE — 93000 ELECTROCARDIOGRAM COMPLETE: CPT | Mod: S$GLB,,, | Performed by: INTERNAL MEDICINE

## 2018-08-31 PROCEDURE — 3008F BODY MASS INDEX DOCD: CPT | Mod: CPTII,S$GLB,, | Performed by: INTERNAL MEDICINE

## 2018-08-31 PROCEDURE — 71046 X-RAY EXAM CHEST 2 VIEWS: CPT | Mod: TC,FY,PO

## 2018-08-31 PROCEDURE — 99214 OFFICE O/P EST MOD 30 MIN: CPT | Mod: S$GLB,,, | Performed by: INTERNAL MEDICINE

## 2018-08-31 RX ORDER — ACETAMINOPHEN 500 MG
1 TABLET ORAL DAILY
Qty: 100 CAPSULE | Refills: 6 | Status: SHIPPED | OUTPATIENT
Start: 2018-08-31 | End: 2021-03-03

## 2018-08-31 RX ORDER — ERGOCALCIFEROL 1.25 MG/1
50000 CAPSULE ORAL WEEKLY
Qty: 4 CAPSULE | Refills: 4 | Status: SHIPPED | OUTPATIENT
Start: 2018-08-31 | End: 2018-09-30

## 2018-10-12 ENCOUNTER — PATIENT MESSAGE (OUTPATIENT)
Dept: INTERNAL MEDICINE | Facility: CLINIC | Age: 38
End: 2018-10-12

## 2018-11-07 ENCOUNTER — OFFICE VISIT (OUTPATIENT)
Dept: OBSTETRICS AND GYNECOLOGY | Facility: CLINIC | Age: 38
End: 2018-11-07
Payer: COMMERCIAL

## 2018-11-07 VITALS
SYSTOLIC BLOOD PRESSURE: 112 MMHG | DIASTOLIC BLOOD PRESSURE: 76 MMHG | WEIGHT: 186.75 LBS | HEIGHT: 66 IN | BODY MASS INDEX: 30.01 KG/M2

## 2018-11-07 DIAGNOSIS — Z00.00 PREVENTATIVE HEALTH CARE: Primary | ICD-10-CM

## 2018-11-07 DIAGNOSIS — Z01.419 CERVICAL SMEAR, AS PART OF ROUTINE GYNECOLOGICAL EXAMINATION: ICD-10-CM

## 2018-11-07 PROCEDURE — 99999 PR PBB SHADOW E&M-EST. PATIENT-LVL III: CPT | Mod: PBBFAC,,, | Performed by: NURSE PRACTITIONER

## 2018-11-07 PROCEDURE — 88175 CYTOPATH C/V AUTO FLUID REDO: CPT

## 2018-11-07 PROCEDURE — 99385 PREV VISIT NEW AGE 18-39: CPT | Mod: S$GLB,,, | Performed by: NURSE PRACTITIONER

## 2018-11-07 PROCEDURE — 87624 HPV HI-RISK TYP POOLED RSLT: CPT

## 2018-11-07 PROCEDURE — 3008F BODY MASS INDEX DOCD: CPT | Mod: CPTII,S$GLB,, | Performed by: NURSE PRACTITIONER

## 2018-11-07 RX ORDER — ERGOCALCIFEROL 1.25 MG/1
1 CAPSULE ORAL WEEKLY
COMMUNITY
Start: 2018-10-27 | End: 2019-03-21 | Stop reason: SDUPTHER

## 2018-11-07 NOTE — PROGRESS NOTES
"CC: Well woman exam    Francis Stafford is a 37 y.o. female  presents for well woman exam.  LMP: Patient's last menstrual period was 10/24/2018..  No issues, problems, or complaints. Is sexually active, uses condoms. Last pap exam was normal. Cycles ar every 26-28 days, not heavy.     Past Medical History:   Diagnosis Date    Anxiety     Depression      Past Surgical History:   Procedure Laterality Date    WISDOM TOOTH EXTRACTION       Social History     Socioeconomic History    Marital status: Single     Spouse name: Not on file    Number of children: 0    Years of education: Not on file    Highest education level: Not on file   Social Needs    Financial resource strain: Not on file    Food insecurity - worry: Not on file    Food insecurity - inability: Not on file    Transportation needs - medical: Not on file    Transportation needs - non-medical: Not on file   Occupational History    Not on file   Tobacco Use    Smoking status: Former Smoker     Types: Cigarettes     Last attempt to quit:      Years since quittin.8    Smokeless tobacco: Never Used   Substance and Sexual Activity    Alcohol use: Yes     Frequency: 2-4 times a month     Drinks per session: 1 or 2     Binge frequency: Never     Comment: Occasional    Drug use: No    Sexual activity: Yes     Partners: Male     Birth control/protection: Condom   Other Topics Concern    Not on file   Social History Narrative    No smokers in household, 2 cats.     Family History   Problem Relation Age of Onset    No Known Problems Mother     No Known Problems Father     No Known Problems Sister     No Known Problems Brother     No Known Problems Brother     Cancer Maternal Grandmother      OB History      Para Term  AB Living    1       1      SAB TAB Ectopic Multiple Live Births                       /76   Ht 5' 6" (1.676 m)   Wt 84.7 kg (186 lb 11.7 oz)   LMP 10/24/2018   BMI 30.14 kg/m² "       ROS:  GENERAL: Denies weight gain or weight loss. Feeling well overall.   SKIN: Denies rash or lesions.   HEAD: Denies head injury or headache.   NODES: Denies enlarged lymph nodes.   CHEST: Denies chest pain or shortness of breath.   CARDIOVASCULAR: Denies palpitations or left sided chest pain.   ABDOMEN: No abdominal pain, constipation, diarrhea, nausea, vomiting or rectal bleeding.   URINARY: No frequency, dysuria, hematuria, or burning on urination.  REPRODUCTIVE: See HPI.   BREASTS: The patient performs breast self-examination and denies pain, lumps, or nipple discharge.   HEMATOLOGIC: No easy bruisability or excessive bleeding.   MUSCULOSKELETAL: Denies joint pain or swelling.   NEUROLOGIC: Denies syncope or weakness.   PSYCHIATRIC: Denies depression, anxiety or mood swings.    PHYSICAL EXAM:  APPEARANCE: Well nourished, well developed, in no acute distress.  AFFECT: WNL, alert and oriented x 3  SKIN: No acne or hirsutism  NECK: Neck symmetric without masses or thyromegaly  NODES: No inguinal, cervical, axillary, or femoral lymph node enlargement  CHEST: Good respiratory effect  ABDOMEN: Soft.  No tenderness or masses.  No hepatosplenomegaly.  No hernias.  BREASTS: Symmetrical, no skin changes or visible lesions.  No palpable masses, nipple discharge bilaterally.  PELVIC: Normal external genitalia without lesions.  Normal hair distribution.  Adequate perineal body, normal urethral meatus.  Vagina moist and well rugated without lesions or discharge.  Cervix pink, without lesions, discharge or tenderness.  No significant cystocele or rectocele.  Bimanual exam shows uterus to be normal size, regular, mobile and nontender.  Adnexa without masses or tenderness.    EXTREMITIES: No edema.    1. Preventative health care  Liquid-based pap smear, screening    HPV High Risk Genotypes, PCR   2. Cervical smear, as part of routine gynecological examination  Liquid-based pap smear, screening    HPV High Risk Genotypes,  PCR    PLAN:  Pap exam  Patient was counseled today on A.C.S. Pap guidelines and recommendations for yearly pelvic exams, mammograms and monthly self breast exams; to see her PCP for other health maintenance.

## 2018-11-12 LAB
HPV HR 12 DNA CVX QL NAA+PROBE: NEGATIVE
HPV16 AG SPEC QL: NEGATIVE
HPV18 DNA SPEC QL NAA+PROBE: NEGATIVE

## 2018-11-20 NOTE — PROGRESS NOTES
"Subjective:      Patient ID: Francis Stafford is a 38 y.o. female.    Chief Complaint: Follow-up      HPI  Here for follow up of medical problems.  Taking vit D daily.  More active lately, exercising more.  Not having headaches lately.  Stress is high, still feels depressed.  BMs a little slow.  No f/c/sw/cough.  No cp/sob/palp.    Updated/ annual due 5/19:  HM:  ref fluvax, 5/18 TDaP, 11/18 Gyn.     Review of Systems   Constitutional: Negative for activity change, chills, diaphoresis, fever and unexpected weight change.   HENT: Positive for hearing loss and rhinorrhea. Negative for trouble swallowing.    Eyes: Negative for discharge and visual disturbance.   Respiratory: Negative for cough, chest tightness, shortness of breath and wheezing.    Cardiovascular: Negative for chest pain, palpitations and leg swelling.   Gastrointestinal: Positive for constipation. Negative for blood in stool, diarrhea, nausea and vomiting.   Endocrine: Negative for polydipsia and polyuria.   Genitourinary: Negative for difficulty urinating, dysuria, frequency, hematuria and menstrual problem.   Musculoskeletal: Negative for arthralgias, joint swelling and neck pain.   Neurological: Negative for weakness and headaches.   Psychiatric/Behavioral: Positive for dysphoric mood. Negative for confusion. The patient is not nervous/anxious.          Objective:   /74 (BP Location: Left arm, Patient Position: Sitting, BP Method: Medium (Manual))   Pulse 86   Temp 98.2 °F (36.8 °C) (Tympanic)   Ht 5' 6" (1.676 m)   Wt 84.4 kg (186 lb 1.1 oz)   LMP 11/20/2018 (Approximate)   SpO2 99%   BMI 30.03 kg/m²     Physical Exam   Constitutional: She is oriented to person, place, and time. She appears well-developed.   HENT:   Mouth/Throat: Oropharynx is clear and moist.   Neck: Neck supple. Carotid bruit is not present. No thyroid mass present.   Cardiovascular: Normal rate, regular rhythm and intact distal pulses. Exam reveals no gallop and no " friction rub.   No murmur heard.  Pulmonary/Chest: Effort normal and breath sounds normal. She has no wheezes. She has no rales.   Abdominal: Soft. Bowel sounds are normal. She exhibits no mass. There is no hepatosplenomegaly. There is no tenderness.   Musculoskeletal: She exhibits no edema.   Lymphadenopathy:     She has no cervical adenopathy.   Neurological: She is alert and oriented to person, place, and time.   Psychiatric: She has a normal mood and affect.       Results for DANIKA NAVARRETE (MRN [id number]) as of 12/4/2018 07:54   Ref. Range 5/31/2018 10:36 8/23/2018 11:03 8/31/2018 09:45 8/31/2018 10:10 11/7/2018 08:51 11/7/2018 08:52 11/27/2018 09:55   Vit D, 25-Hydroxy Latest Ref Range: 30 - 96 ng/mL 16 (L) 23 (L)     33       Assessment:       1. Anxiety and depression    2. Tension headache    3. Vitamin D deficiency    4. Mixed anxiety depressive disorder          Plan:     Anxiety and depression- increase to 150mg, recheck 3mo.  -     venlafaxine (EFFEXOR-XR) 150 MG Cp24; Take 1 capsule (150 mg total) by mouth once daily. One 150mg capsule + One 37.5mg capsule daily = Total 187.5mg daily.  Dispense: 90 capsule; Refill: 3    Tension headache- doing well.    Vitamin D deficiency- cont supp daily.    RTC 3mo.  Increase fluids/fiber.  Poss prunes.

## 2018-11-27 ENCOUNTER — LAB VISIT (OUTPATIENT)
Dept: LAB | Facility: HOSPITAL | Age: 38
End: 2018-11-27
Attending: INTERNAL MEDICINE
Payer: COMMERCIAL

## 2018-11-27 DIAGNOSIS — E55.9 VITAMIN D DEFICIENCY: ICD-10-CM

## 2018-11-27 LAB — 25(OH)D3+25(OH)D2 SERPL-MCNC: 33 NG/ML

## 2018-11-27 PROCEDURE — 36415 COLL VENOUS BLD VENIPUNCTURE: CPT | Mod: PO

## 2018-11-27 PROCEDURE — 82306 VITAMIN D 25 HYDROXY: CPT

## 2018-12-04 ENCOUNTER — OFFICE VISIT (OUTPATIENT)
Dept: INTERNAL MEDICINE | Facility: CLINIC | Age: 38
End: 2018-12-04
Payer: COMMERCIAL

## 2018-12-04 VITALS
TEMPERATURE: 98 F | BODY MASS INDEX: 29.9 KG/M2 | OXYGEN SATURATION: 99 % | DIASTOLIC BLOOD PRESSURE: 74 MMHG | WEIGHT: 186.06 LBS | HEIGHT: 66 IN | SYSTOLIC BLOOD PRESSURE: 116 MMHG | HEART RATE: 86 BPM

## 2018-12-04 DIAGNOSIS — E55.9 VITAMIN D DEFICIENCY: ICD-10-CM

## 2018-12-04 DIAGNOSIS — G44.209 TENSION HEADACHE: ICD-10-CM

## 2018-12-04 DIAGNOSIS — F41.8 MIXED ANXIETY DEPRESSIVE DISORDER: ICD-10-CM

## 2018-12-04 DIAGNOSIS — F32.A ANXIETY AND DEPRESSION: Primary | ICD-10-CM

## 2018-12-04 DIAGNOSIS — F41.9 ANXIETY AND DEPRESSION: Primary | ICD-10-CM

## 2018-12-04 PROCEDURE — 99999 PR PBB SHADOW E&M-EST. PATIENT-LVL III: CPT | Mod: PBBFAC,,, | Performed by: INTERNAL MEDICINE

## 2018-12-04 PROCEDURE — 99214 OFFICE O/P EST MOD 30 MIN: CPT | Mod: S$GLB,,, | Performed by: INTERNAL MEDICINE

## 2018-12-04 PROCEDURE — 3008F BODY MASS INDEX DOCD: CPT | Mod: CPTII,S$GLB,, | Performed by: INTERNAL MEDICINE

## 2018-12-04 RX ORDER — VENLAFAXINE HYDROCHLORIDE 150 MG/1
150 CAPSULE, EXTENDED RELEASE ORAL DAILY
Qty: 90 CAPSULE | Refills: 3 | Status: SHIPPED | OUTPATIENT
Start: 2018-12-04 | End: 2020-02-04 | Stop reason: SDUPTHER

## 2019-03-04 NOTE — PROGRESS NOTES
Subjective:       Patient ID: Francis Stafford is a 38 y.o. female.    Chief Complaint: Follow-up (3 months )    38 year old female presents to clinic for 3 month f/u from last PCP visit, at which time Effexor was increased to 150mg QD. Pt is new to me. She reports her anxiety/depression sxs are stable. She was taking 225mg of Effexor in the past and she sometimes thinks she may need to go back to that dose. She reports being able to manage her daily activities well and has decided to stay at 150mg at this time. She was in counseling in the past but stopped when insurance coverage changed. She reports no SI/HI, CP, SOB, or other medical complaints.    PCP: Dr. Boyle    Patient Active Problem List:     Obesity, Class I, BMI 30-34.9     Anxiety and depression     Tension headache     Vitamin D deficiency      Review of Systems   Constitutional: Negative for activity change, chills, fever and unexpected weight change.   HENT: Negative for hearing loss, rhinorrhea and trouble swallowing.    Eyes: Negative for discharge and visual disturbance.   Respiratory: Negative for cough, chest tightness, shortness of breath and wheezing.    Cardiovascular: Negative for chest pain, palpitations and leg swelling.   Gastrointestinal: Negative for blood in stool, constipation, diarrhea, nausea and vomiting.   Endocrine: Negative for polydipsia and polyuria.   Genitourinary: Negative for difficulty urinating, dysuria, hematuria and menstrual problem.   Musculoskeletal: Negative for arthralgias, joint swelling and neck pain.   Skin: Negative for rash.   Neurological: Negative for dizziness, weakness, numbness and headaches.   Psychiatric/Behavioral: Negative for confusion and suicidal ideas.       Objective:       Vitals:    03/05/19 0804   BP: 118/76   BP Location: Left arm   Patient Position: Sitting   BP Method: Medium (Manual)   Pulse: 80   Temp: 97.1 °F (36.2 °C)   TempSrc: Tympanic   SpO2: 98%   Weight: 85.5 kg (188 lb 7.9 oz)  "  Height: 5' 6" (1.676 m)     Physical Exam   Constitutional: She is oriented to person, place, and time. She appears well-developed and well-nourished. No distress.   HENT:   Head: Normocephalic and atraumatic.   Eyes: EOM are normal. No scleral icterus.   Neck: Neck supple.   Cardiovascular: Normal rate and regular rhythm.   Pulmonary/Chest: Effort normal and breath sounds normal. No stridor. No respiratory distress. She has no decreased breath sounds. She has no wheezes. She has no rhonchi. She has no rales.   Musculoskeletal: Normal range of motion.   Neurological: She is alert and oriented to person, place, and time. No cranial nerve deficit.   Skin: Skin is warm and dry. No rash noted.   Psychiatric: She has a normal mood and affect. Her speech is normal and behavior is normal. Thought content normal.       Assessment:       1. Anxiety and depression    2. Vitamin D deficiency    3. Tension headache    4. Obesity, Class I, BMI 30-34.9        Plan:         Francis was seen today for follow-up.    Diagnoses and all orders for this visit:    Anxiety and depression  Stable. Continue Effexor 150mg QD and monitor sxs. Refer to counseling - contact info for [facility] social work dept given to pt, pt to schedule appt.    Vitamin D deficiency    Tension headache  Stable.     Obesity, Class I, BMI 30-34.9    F/u with PCP in 3 months for further health management.    "

## 2019-03-05 ENCOUNTER — OFFICE VISIT (OUTPATIENT)
Dept: INTERNAL MEDICINE | Facility: CLINIC | Age: 39
End: 2019-03-05
Payer: COMMERCIAL

## 2019-03-05 VITALS
HEIGHT: 66 IN | WEIGHT: 188.5 LBS | SYSTOLIC BLOOD PRESSURE: 118 MMHG | DIASTOLIC BLOOD PRESSURE: 76 MMHG | OXYGEN SATURATION: 98 % | TEMPERATURE: 97 F | HEART RATE: 80 BPM | BODY MASS INDEX: 30.29 KG/M2

## 2019-03-05 DIAGNOSIS — E55.9 VITAMIN D DEFICIENCY: ICD-10-CM

## 2019-03-05 DIAGNOSIS — G44.209 TENSION HEADACHE: ICD-10-CM

## 2019-03-05 DIAGNOSIS — F32.A ANXIETY AND DEPRESSION: Primary | ICD-10-CM

## 2019-03-05 DIAGNOSIS — F41.9 ANXIETY AND DEPRESSION: Primary | ICD-10-CM

## 2019-03-05 DIAGNOSIS — E66.9 OBESITY, CLASS I, BMI 30-34.9: ICD-10-CM

## 2019-03-05 PROCEDURE — 99999 PR PBB SHADOW E&M-EST. PATIENT-LVL III: CPT | Mod: PBBFAC,,, | Performed by: PHYSICIAN ASSISTANT

## 2019-03-05 PROCEDURE — 99213 PR OFFICE/OUTPT VISIT, EST, LEVL III, 20-29 MIN: ICD-10-PCS | Mod: S$GLB,,, | Performed by: PHYSICIAN ASSISTANT

## 2019-03-05 PROCEDURE — 99213 OFFICE O/P EST LOW 20 MIN: CPT | Mod: S$GLB,,, | Performed by: PHYSICIAN ASSISTANT

## 2019-03-05 PROCEDURE — 99999 PR PBB SHADOW E&M-EST. PATIENT-LVL III: ICD-10-PCS | Mod: PBBFAC,,, | Performed by: PHYSICIAN ASSISTANT

## 2019-03-05 PROCEDURE — 3008F PR BODY MASS INDEX (BMI) DOCUMENTED: ICD-10-PCS | Mod: CPTII,S$GLB,, | Performed by: PHYSICIAN ASSISTANT

## 2019-03-05 PROCEDURE — 3008F BODY MASS INDEX DOCD: CPT | Mod: CPTII,S$GLB,, | Performed by: PHYSICIAN ASSISTANT

## 2019-03-22 RX ORDER — ERGOCALCIFEROL 1.25 MG/1
CAPSULE ORAL WEEKLY
Qty: 4 CAPSULE | Refills: 3 | Status: SHIPPED | OUTPATIENT
Start: 2019-03-22 | End: 2021-03-03

## 2020-02-04 DIAGNOSIS — F41.9 ANXIETY AND DEPRESSION: ICD-10-CM

## 2020-02-04 DIAGNOSIS — F32.A ANXIETY AND DEPRESSION: ICD-10-CM

## 2020-02-04 RX ORDER — VENLAFAXINE HYDROCHLORIDE 150 MG/1
150 CAPSULE, EXTENDED RELEASE ORAL DAILY
Qty: 90 CAPSULE | Refills: 0 | Status: SHIPPED | OUTPATIENT
Start: 2020-02-04 | End: 2020-02-11 | Stop reason: SDUPTHER

## 2020-02-04 NOTE — TELEPHONE ENCOUNTER
----- Message from Jessie Wyman sent at 2/4/2020  1:17 PM CST -----  Contact: pt  She's calling in regards to refill       pls call pt back at [phone] (home)

## 2020-02-11 ENCOUNTER — TELEPHONE (OUTPATIENT)
Dept: FAMILY MEDICINE | Facility: CLINIC | Age: 40
End: 2020-02-11

## 2020-02-11 ENCOUNTER — LAB VISIT (OUTPATIENT)
Dept: LAB | Facility: HOSPITAL | Age: 40
End: 2020-02-11
Attending: INTERNAL MEDICINE
Payer: COMMERCIAL

## 2020-02-11 ENCOUNTER — OFFICE VISIT (OUTPATIENT)
Dept: FAMILY MEDICINE | Facility: CLINIC | Age: 40
End: 2020-02-11
Payer: COMMERCIAL

## 2020-02-11 VITALS
SYSTOLIC BLOOD PRESSURE: 126 MMHG | OXYGEN SATURATION: 98 % | TEMPERATURE: 98 F | WEIGHT: 198.63 LBS | HEART RATE: 103 BPM | BODY MASS INDEX: 31.92 KG/M2 | HEIGHT: 66 IN | DIASTOLIC BLOOD PRESSURE: 88 MMHG

## 2020-02-11 DIAGNOSIS — F41.9 ANXIETY AND DEPRESSION: ICD-10-CM

## 2020-02-11 DIAGNOSIS — E55.9 VITAMIN D DEFICIENCY: ICD-10-CM

## 2020-02-11 DIAGNOSIS — F32.A ANXIETY AND DEPRESSION: ICD-10-CM

## 2020-02-11 DIAGNOSIS — Z00.00 ENCOUNTER FOR PREVENTIVE HEALTH EXAMINATION: Primary | ICD-10-CM

## 2020-02-11 DIAGNOSIS — G44.209 TENSION HEADACHE: ICD-10-CM

## 2020-02-11 DIAGNOSIS — Z00.00 ENCOUNTER FOR PREVENTIVE HEALTH EXAMINATION: ICD-10-CM

## 2020-02-11 LAB
ALBUMIN SERPL BCP-MCNC: 4.2 G/DL (ref 3.5–5.2)
ALP SERPL-CCNC: 73 U/L (ref 55–135)
ALT SERPL W/O P-5'-P-CCNC: 18 U/L (ref 10–44)
ANION GAP SERPL CALC-SCNC: 11 MMOL/L (ref 8–16)
AST SERPL-CCNC: 21 U/L (ref 10–40)
BASOPHILS # BLD AUTO: 0.03 K/UL (ref 0–0.2)
BASOPHILS NFR BLD: 0.3 % (ref 0–1.9)
BILIRUB SERPL-MCNC: 0.3 MG/DL (ref 0.1–1)
BUN SERPL-MCNC: 12 MG/DL (ref 6–20)
CALCIUM SERPL-MCNC: 9.3 MG/DL (ref 8.7–10.5)
CHLORIDE SERPL-SCNC: 101 MMOL/L (ref 95–110)
CHOLEST SERPL-MCNC: 256 MG/DL (ref 120–199)
CHOLEST/HDLC SERPL: 3.6 {RATIO} (ref 2–5)
CO2 SERPL-SCNC: 26 MMOL/L (ref 23–29)
CREAT SERPL-MCNC: 0.8 MG/DL (ref 0.5–1.4)
DIFFERENTIAL METHOD: ABNORMAL
EOSINOPHIL # BLD AUTO: 0.1 K/UL (ref 0–0.5)
EOSINOPHIL NFR BLD: 0.9 % (ref 0–8)
ERYTHROCYTE [DISTWIDTH] IN BLOOD BY AUTOMATED COUNT: 12.5 % (ref 11.5–14.5)
EST. GFR  (AFRICAN AMERICAN): >60 ML/MIN/1.73 M^2
EST. GFR  (NON AFRICAN AMERICAN): >60 ML/MIN/1.73 M^2
GLUCOSE SERPL-MCNC: 70 MG/DL (ref 70–110)
HCT VFR BLD AUTO: 40.7 % (ref 37–48.5)
HDLC SERPL-MCNC: 72 MG/DL (ref 40–75)
HDLC SERPL: 28.1 % (ref 20–50)
HGB BLD-MCNC: 12.5 G/DL (ref 12–16)
IMM GRANULOCYTES # BLD AUTO: 0.02 K/UL (ref 0–0.04)
IMM GRANULOCYTES NFR BLD AUTO: 0.2 % (ref 0–0.5)
LDLC SERPL CALC-MCNC: 150.8 MG/DL (ref 63–159)
LYMPHOCYTES # BLD AUTO: 2.9 K/UL (ref 1–4.8)
LYMPHOCYTES NFR BLD: 31.7 % (ref 18–48)
MCH RBC QN AUTO: 28.9 PG (ref 27–31)
MCHC RBC AUTO-ENTMCNC: 30.7 G/DL (ref 32–36)
MCV RBC AUTO: 94 FL (ref 82–98)
MONOCYTES # BLD AUTO: 0.7 K/UL (ref 0.3–1)
MONOCYTES NFR BLD: 7.2 % (ref 4–15)
NEUTROPHILS # BLD AUTO: 5.5 K/UL (ref 1.8–7.7)
NEUTROPHILS NFR BLD: 59.7 % (ref 38–73)
NONHDLC SERPL-MCNC: 184 MG/DL
NRBC BLD-RTO: 0 /100 WBC
PLATELET # BLD AUTO: 292 K/UL (ref 150–350)
PMV BLD AUTO: 11.8 FL (ref 9.2–12.9)
POTASSIUM SERPL-SCNC: 4.3 MMOL/L (ref 3.5–5.1)
PROT SERPL-MCNC: 8.1 G/DL (ref 6–8.4)
RBC # BLD AUTO: 4.32 M/UL (ref 4–5.4)
SODIUM SERPL-SCNC: 138 MMOL/L (ref 136–145)
TRIGL SERPL-MCNC: 166 MG/DL (ref 30–150)
TSH SERPL DL<=0.005 MIU/L-ACNC: 2.78 UIU/ML (ref 0.4–4)
WBC # BLD AUTO: 9.23 K/UL (ref 3.9–12.7)

## 2020-02-11 PROCEDURE — 99999 PR PBB SHADOW E&M-EST. PATIENT-LVL III: CPT | Mod: PBBFAC,,, | Performed by: INTERNAL MEDICINE

## 2020-02-11 PROCEDURE — 83036 HEMOGLOBIN GLYCOSYLATED A1C: CPT

## 2020-02-11 PROCEDURE — 85025 COMPLETE CBC W/AUTO DIFF WBC: CPT

## 2020-02-11 PROCEDURE — 82306 VITAMIN D 25 HYDROXY: CPT

## 2020-02-11 PROCEDURE — 90632 HEPA VACCINE ADULT IM: CPT | Mod: S$GLB,,, | Performed by: INTERNAL MEDICINE

## 2020-02-11 PROCEDURE — 90471 HEPATITIS A VACCINE ADULT IM: ICD-10-PCS | Mod: S$GLB,,, | Performed by: INTERNAL MEDICINE

## 2020-02-11 PROCEDURE — 90471 IMMUNIZATION ADMIN: CPT | Mod: S$GLB,,, | Performed by: INTERNAL MEDICINE

## 2020-02-11 PROCEDURE — 90632 HEPATITIS A VACCINE ADULT IM: ICD-10-PCS | Mod: S$GLB,,, | Performed by: INTERNAL MEDICINE

## 2020-02-11 PROCEDURE — 80053 COMPREHEN METABOLIC PANEL: CPT

## 2020-02-11 PROCEDURE — 36415 COLL VENOUS BLD VENIPUNCTURE: CPT | Mod: PO

## 2020-02-11 PROCEDURE — 99999 PR PBB SHADOW E&M-EST. PATIENT-LVL III: ICD-10-PCS | Mod: PBBFAC,,, | Performed by: INTERNAL MEDICINE

## 2020-02-11 PROCEDURE — 84443 ASSAY THYROID STIM HORMONE: CPT

## 2020-02-11 PROCEDURE — 80061 LIPID PANEL: CPT

## 2020-02-11 PROCEDURE — 99395 PREV VISIT EST AGE 18-39: CPT | Mod: 25,S$GLB,, | Performed by: INTERNAL MEDICINE

## 2020-02-11 PROCEDURE — 99395 PR PREVENTIVE VISIT,EST,18-39: ICD-10-PCS | Mod: 25,S$GLB,, | Performed by: INTERNAL MEDICINE

## 2020-02-11 RX ORDER — VENLAFAXINE HYDROCHLORIDE 75 MG/1
225 CAPSULE, EXTENDED RELEASE ORAL DAILY
Qty: 270 CAPSULE | Refills: 3 | Status: SHIPPED | OUTPATIENT
Start: 2020-02-11 | End: 2020-02-11 | Stop reason: SDUPTHER

## 2020-02-11 RX ORDER — VENLAFAXINE HYDROCHLORIDE 75 MG/1
225 CAPSULE, EXTENDED RELEASE ORAL DAILY
Qty: 270 CAPSULE | Refills: 3 | Status: SHIPPED | OUTPATIENT
Start: 2020-02-11 | End: 2020-05-05 | Stop reason: SDUPTHER

## 2020-02-11 NOTE — PROGRESS NOTES
"Subjective:      Patient ID: Francis Stafford is a 39 y.o. female.    Chief Complaint: Follow-up      HPI  Here for f/u medical problems and preventive exam.  Feels tired, works a lot.  Walks once a week, climbs once a week.  Stopped taking vit D.  No f/c/sw/cough.  No cp/sob/palp.  BMs normal.  Urine normal.  Taking effexor 150mg, thinks needs more for both depression and anxiety.        HM:  ref fluvax, 2/20 today HAV, 5/18 TDaP, 11/18 Gyn.     Review of Systems   Constitutional: Negative for appetite change, chills, diaphoresis and fever.   HENT: Negative for congestion, ear pain, rhinorrhea, sinus pressure and sore throat.    Respiratory: Negative for cough, chest tightness and shortness of breath.    Cardiovascular: Negative for chest pain and palpitations.   Gastrointestinal: Negative for blood in stool, constipation, diarrhea, nausea and vomiting.   Genitourinary: Negative for dysuria, frequency, hematuria, menstrual problem, urgency and vaginal discharge.   Musculoskeletal: Negative for arthralgias.   Skin: Negative for rash.   Neurological: Negative for dizziness and headaches.   Psychiatric/Behavioral: Negative for sleep disturbance. The patient is not nervous/anxious.          Objective:   /88 (BP Location: Left arm, Patient Position: Sitting, BP Method: Medium (Manual))   Pulse 103   Temp 98.1 °F (36.7 °C) (Temporal)   Ht 5' 6" (1.676 m)   Wt 90.1 kg (198 lb 10.2 oz)   LMP 02/04/2020 (Approximate)   SpO2 98%   BMI 32.06 kg/m²     Physical Exam   Constitutional: She is oriented to person, place, and time. She appears well-developed and well-nourished.   HENT:   Right Ear: External ear normal. Tympanic membrane is not injected.   Left Ear: External ear normal. Tympanic membrane is not injected.   Mouth/Throat: Oropharynx is clear and moist.   Eyes: Conjunctivae are normal.   Neck: Normal range of motion. Neck supple. No thyromegaly present.   Cardiovascular: Normal rate, regular rhythm and intact " distal pulses. Exam reveals no gallop and no friction rub.   No murmur heard.  Pulmonary/Chest: Effort normal and breath sounds normal. She has no wheezes. She has no rales.   Abdominal: Soft. Bowel sounds are normal. She exhibits no mass. There is no tenderness.   Musculoskeletal: She exhibits no edema.   Lymphadenopathy:     She has no cervical adenopathy.   Neurological: She is alert and oriented to person, place, and time.   Skin: Skin is warm. No rash noted.   Psychiatric: She has a normal mood and affect.           Assessment:       1. Encounter for preventive health examination    2. Anxiety and depression    3. Tension headache    4. Vitamin D deficiency          Plan:     Encounter for preventive health examination- hAV now.  Lab with [facility].  -     Hepatitis A Vaccine (Adult) (IM)  -     CBC auto differential; Future; Expected date: 02/11/2020  -     Comprehensive metabolic panel; Future; Expected date: 02/11/2020  -     Lipid panel; Future; Expected date: 02/11/2020  -     TSH; Future; Expected date: 02/11/2020  -     Vitamin D; Future  -     Hemoglobin A1c; Future; Expected date: 02/11/2020    Anxiety and depression- increase to 225mg.  RTC 6wk.  -     venlafaxine (EFFEXOR-XR) 75 MG 24 hr capsule; Take 3 capsules (225 mg total) by mouth once daily. One 150mg capsule + One 37.5mg capsule daily = Total 187.5mg daily.  Dispense: 270 capsule; Refill: 3    Tension headache- not frequent.    Vitamin D deficiency  -     Vitamin D; Future

## 2020-02-11 NOTE — TELEPHONE ENCOUNTER
----- Message from Elvira Kapoor sent at 2/11/2020  2:45 PM CST -----  Contact: pharmacy  .Type:  Pharmacy Calling to Clarify an RX    Name of Caller: pharmacy  [facility]   Prescription Name: effexor   What do they need to clarify?: clarification    Best Call Back Number:  [phone]    Additional Information:

## 2020-02-12 LAB
25(OH)D3+25(OH)D2 SERPL-MCNC: 17 NG/ML (ref 30–96)
ESTIMATED AVG GLUCOSE: 111 MG/DL (ref 68–131)
HBA1C MFR BLD HPLC: 5.5 % (ref 4–5.6)

## 2020-03-10 NOTE — PROGRESS NOTES
Subjective:      Patient ID: Francis Stafford is a 39 y.o. female.    Chief Complaint: No chief complaint on file.      HPI  Here for follow up of medical problems.      Updated/ annual due 2/21:  HM:  ref fluvax, 2/20 HAV, 5/18 TDaP, 11/18 Gyn.     Review of Systems      Objective:   There were no vitals taken for this visit.    Physical Exam        Assessment:       No diagnosis found.      Plan:     There are no diagnoses linked to this encounter.

## 2020-03-23 ENCOUNTER — TELEPHONE (OUTPATIENT)
Dept: FAMILY MEDICINE | Facility: CLINIC | Age: 40
End: 2020-03-23

## 2020-03-23 NOTE — TELEPHONE ENCOUNTER
----- Message from Senait Sarabia sent at 3/23/2020  3:06 PM CDT -----  Contact: pt   .Type:  Patient Returning Call    Who Called:pt  Who Left Message for Patient:Brittani  Does the patient know what this is regarding?:appt  Would the patient rather a call back or a response via [facility]? Call back  Best Call Back Number:[phone]  Additional Information:

## 2020-03-24 ENCOUNTER — OFFICE VISIT (OUTPATIENT)
Dept: FAMILY MEDICINE | Facility: CLINIC | Age: 40
End: 2020-03-24
Payer: COMMERCIAL

## 2020-03-24 ENCOUNTER — PATIENT MESSAGE (OUTPATIENT)
Dept: FAMILY MEDICINE | Facility: CLINIC | Age: 40
End: 2020-03-24

## 2020-03-24 DIAGNOSIS — F32.A ANXIETY AND DEPRESSION: Primary | ICD-10-CM

## 2020-03-24 DIAGNOSIS — F41.9 ANXIETY AND DEPRESSION: Primary | ICD-10-CM

## 2020-03-24 PROCEDURE — 99213 PR OFFICE/OUTPT VISIT, EST, LEVL III, 20-29 MIN: ICD-10-PCS | Mod: 95,,, | Performed by: INTERNAL MEDICINE

## 2020-03-24 PROCEDURE — 99213 OFFICE O/P EST LOW 20 MIN: CPT | Mod: 95,,, | Performed by: INTERNAL MEDICINE

## 2020-03-24 RX ORDER — BUPROPION HYDROCHLORIDE 100 MG/1
100 TABLET, EXTENDED RELEASE ORAL EVERY MORNING
Qty: 30 TABLET | Refills: 11 | Status: SHIPPED | OUTPATIENT
Start: 2020-03-24 | End: 2020-03-24 | Stop reason: SDUPTHER

## 2020-03-24 RX ORDER — BUPROPION HYDROCHLORIDE 100 MG/1
100 TABLET, EXTENDED RELEASE ORAL EVERY MORNING
Qty: 30 TABLET | Refills: 11 | Status: SHIPPED | OUTPATIENT
Start: 2020-03-24 | End: 2020-04-20 | Stop reason: SINTOL

## 2020-03-24 NOTE — PROGRESS NOTES
Primary Care Telemedicine Note  The patient location is:  Patient Home   The chief complaint leading to consultation is:   Total time spent with patient: 15 minutes.    Visit type: Virtual visit with synchronous audio only and video  Each patient to whom he or she provides medical services by telemedicine is:  (1) informed of the relationship between the physician and patient and the respective role of any other health care provider with respect to management of the patient; and (2) notified that he or she may decline to receive medical services by telemedicine and may withdraw from such care at any time.      Updated/ annual due 2/21:  HM:  ref fluvax, 2/20 today HAV, 5/18 TDaP, 11/18 Gyn.         HPI:  Anxiety doing pretty well on rx.  Not much energy in the day until around 5pm.  Goes skating about once per week, and trying to ride bike sometimes.  No f/c/sw/cough.  Sleeps ok.      Problem List Items Addressed This Visit     Anxiety and depression - Primary    Relevant Medications    buPROPion (WELLBUTRIN SR) 100 MG TBSR 12 hr tablet          The patient's Health Maintenance was reviewed and the following appears to be due at this time:  There are no preventive care reminders to display for this patient.    [unfilled]  Outpatient Medications Prior to Visit   Medication Sig Dispense Refill    cholecalciferol, vitamin D3, 2,000 unit Cap Take 1 capsule (2,000 Units total) by mouth once daily. 100 capsule 6    ergocalciferol (ERGOCALCIFEROL) 50,000 unit Cap TAKE 1 CAPSULE BY MOUTH ONCE A WEEK 4 capsule 3    venlafaxine (EFFEXOR-XR) 75 MG 24 hr capsule Take 3 capsules (225 mg total) by mouth once daily. 270 capsule 3     No facility-administered medications prior to visit.         Physical Exam   No flowsheet data found.  No flowsheet data found.      Constitutional: The patient appears well-developed and well-nourished. No distress.   Psychiatric: The mood appears not anxious and the affect is not angry, not  blunt, not labile and not inappropriate. Speech is not rapid and/or pressured, not delayed, not tangential and not slurred. The patient is not agitated, not aggressive, is not hyperactive, not slowed, not withdrawn, not actively hallucinating and not combative. Thought content is not paranoid and not delusional. Cognition and memory are not impaired. The patient does not express impulsivity or inappropriate judgment and does not exhibit a depressed mood. The patient expresses no homicidal and no suicidal ideation and has no suicidal plans and no homicidal plans. The patient is communicative and exhibits a normal recent memory and normal remote memory. The patient is attentive.     Encounter Diagnosis   Name Primary?    Anxiety and depression Yes       PLAN:    I am having Francis Stafford maintain her cholecalciferol (vitamin D3), ergocalciferol, venlafaxine, and buPROPion.    Diagnoses and all orders for this visit:    Anxiety and depression- cont effexor, add AM wellbutrin.  Videovisit in 1mo.  -     buPROPion (WELLBUTRIN SR) 100 MG TBSR 12 hr tablet; Take 1 tablet (100 mg total) by mouth every morning.        Medications Ordered This Encounter   Medications    buPROPion (WELLBUTRIN SR) 100 MG TBSR 12 hr tablet     Sig: Take 1 tablet (100 mg total) by mouth every morning.     Dispense:  30 tablet     Refill:  11     Medications Ordered This Encounter   Medications    buPROPion (WELLBUTRIN SR) 100 MG TBSR 12 hr tablet     Sig: Take 1 tablet (100 mg total) by mouth every morning.     Dispense:  30 tablet     Refill:  11     No orders of the defined types were placed in this encounter.

## 2020-04-19 ENCOUNTER — PATIENT MESSAGE (OUTPATIENT)
Dept: FAMILY MEDICINE | Facility: CLINIC | Age: 40
End: 2020-04-19

## 2020-04-19 DIAGNOSIS — F32.A ANXIETY AND DEPRESSION: Primary | ICD-10-CM

## 2020-04-19 DIAGNOSIS — F41.9 ANXIETY AND DEPRESSION: Primary | ICD-10-CM

## 2020-04-20 NOTE — TELEPHONE ENCOUNTER
Pt needs referrals for Psych to schedule appt for her.  Does she need to see both?  I'm attaching referrals.  Please sign what is appropriate and send back so I can let the staff know./rpr

## 2020-04-21 ENCOUNTER — OFFICE VISIT (OUTPATIENT)
Dept: PSYCHIATRY | Facility: CLINIC | Age: 40
End: 2020-04-21
Payer: COMMERCIAL

## 2020-04-21 DIAGNOSIS — F33.1 MODERATE EPISODE OF RECURRENT MAJOR DEPRESSIVE DISORDER: Primary | ICD-10-CM

## 2020-04-21 DIAGNOSIS — F41.0 GENERALIZED ANXIETY DISORDER WITH PANIC ATTACKS: ICD-10-CM

## 2020-04-21 DIAGNOSIS — F41.1 GENERALIZED ANXIETY DISORDER WITH PANIC ATTACKS: ICD-10-CM

## 2020-04-21 DIAGNOSIS — F40.10 SOCIAL ANXIETY DISORDER: ICD-10-CM

## 2020-04-21 DIAGNOSIS — F32.A ANXIETY AND DEPRESSION: ICD-10-CM

## 2020-04-21 DIAGNOSIS — F41.9 ANXIETY AND DEPRESSION: ICD-10-CM

## 2020-04-21 PROCEDURE — 90792 PSYCH DIAG EVAL W/MED SRVCS: CPT | Mod: 95,,, | Performed by: NURSE PRACTITIONER

## 2020-04-21 PROCEDURE — 90792 PR PSYCHIATRIC DIAGNOSTIC EVALUATION W/MEDICAL SERVICES: ICD-10-PCS | Mod: 95,,, | Performed by: NURSE PRACTITIONER

## 2020-04-21 RX ORDER — ARIPIPRAZOLE 2 MG/1
2 TABLET ORAL DAILY
Qty: 30 TABLET | Refills: 0 | Status: SHIPPED | OUTPATIENT
Start: 2020-04-21 | End: 2020-05-05 | Stop reason: SDUPTHER

## 2020-04-21 NOTE — PROGRESS NOTES
"Outpatient Psychiatry Initial Visit (MD/NP)      Disclaimer: Evaluation and treatment is based on information presented to date. Any new information may affect assessment and findings.       Note: I reviewed Epic EHR_"Encounters" Info for general background info.     reviewed this date    The patient location is: home    Visit type: audiovisual  Total time spent with patient: 45 min face to face and coordination of care  Each patient to whom he or she provides medical services by telemedicine is:  (1) informed of the relationship between the physician and patient and the respective role of any other health care provider with respect to management of the patient; and (2) notified that he or she may decline to receive medical services by telemedicine and may withdraw from such care at any time.                4/21/2020    Francis Stafford, a 39 y.o. female, presenting for initial evaluation visit. Met with patient.  Calm, cooperative, engaging.    Reason for Encounter: Referral from PRO Hartman MD. " for my depression and anxiety."     History of Present Illness: states has been suffering with depression and anxiety, feels her medication has not been working.  Has been on Effexor for 3 years, and she tried to wean herself off of it,  found herself with increased depression when she was at 100 mg.   she went back up to the 225 mg.  She was started on Wellbutrin, took for a month, states it gave her really bad headaches.    States she struggled with anxiety and depression as a child.  had anxiety all her life.      Describes her current depressive symptoms as fatigue, hopeless, sadness, some crying episodes, anhedonia, isolates, " I don't feel comfortable around people"    Appetite is " ok."   She is sleeping 10-11 hours a day; would stay in bed all day if she could, or if she does not have to work.        Verbalizes panic attacks - "overwhelming feeling of dread, doom; knot in her chest; weighted down, " "hyperventilating"  Has not had panic attack " in a long time."  States she avoids doing anything that could bring this on.  States she can't go to parties or crowds. She will avoid going places where there are crowds, are more than a few people.  Able to grocery shop.  Describes last episode at New Years and having to leave because of feelings of doom.    She states she does not have any energy, fatigued all of the time in addition to her depression and anxiety.    Focus and concentration is difficult for her, but she feels she is able to complete tasks.  She does not like to take on multiple tasks as this is overwhelming for her.     states she works late nights, weekends; and is currently working in [employer] and [place].            Symptom Clusters:  Depressive Disorder: REPORTS depressed mood, irritable mood, tired/fatigued, worthlessness, hopelessness.   Anxiety Disorder: hyperarousal symptoms, re-experiencing symptoms, fatigue, irritability, muscle tension, excessive worry.   Manic Disorder: DENIES none.   Psychotic Disorder: DENIES none.   Substance Use:  DENIES alcohol.   Physical or Sexual Abuse: DENIES none.       Review Of Systems:     Review of Systems   Constitutional: Negative.  Negative for activity change, appetite change and unexpected weight change.   HENT: Negative.    Eyes: Negative.  Negative for visual disturbance.   Respiratory: Negative.    Cardiovascular: Negative.    Gastrointestinal: Negative.  Negative for constipation, diarrhea and nausea.   Endocrine: Negative.    Genitourinary: Negative.    Musculoskeletal: Negative.    Skin: Negative for color change.   Allergic/Immunologic: Negative.  Negative for food allergies.   Neurological: Negative.  Negative for dizziness, tremors, seizures, speech difficulty, weakness, light-headedness and headaches.   Hematological: Negative.    Psychiatric/Behavioral: Positive for decreased concentration and dysphoric mood. Negative for agitation, " "behavioral problems, confusion, hallucinations, self-injury, sleep disturbance and suicidal ideas. The patient is nervous/anxious. The patient is not hyperactive.        Current Evaluation:           Constitutional  Vitals:  Most recent vital signs, dated greater than 90 days prior to this appointment, were reviewed.      Last 3 sets of Vitals    Vitals - 1 value per visit 12/4/2018 3/5/2019 2/11/2020   SYSTOLIC 116 118 126   DIASTOLIC 74 76 88   PULSE 86 80 103   TEMPERATURE 98.2 97.1 98.1   RESPIRATIONS - - -   SPO2 99 98 98   Weight (lb) 186.07 188.49 198.63   Weight (kg) 84.4 85.5 90.1   HEIGHT 5' 6" 5' 6" 5' 6"   BODY MASS INDEX 30.03 30.42 32.06   VISIT REPORT - - -   Waist Measurements - - -   Pain Score  0 0 0       General:  unremarkable, age appropriate, casually dressed, neatly groomed, overweight     Musculoskeletal  Muscle Strength/Tone:  not examined   Gait & Station:  non-ataxic     Psychiatric  Speech:  no latency; no press   Mood & Affect:  anxious, depressed, sad  blunted   Thought Process:  normal and logical   Associations:  intact   Thought Content:  normal, no suicidality, no homicidality, delusions, or paranoia   Insight:  intact, has awareness of illness   Judgement: behavior is adequate to circumstances, age appropriate   Orientation:  grossly intact, person, place, situation, time/date, day of week, month of year, year   Memory: intact for content of interview, able to remember recent events- yes, able to remember remote events- yes   Language: grossly intact   Attention Span & Concentration:  distracted   Fund of Knowledge:  intact and appropriate to age and level of education, familiar with aspects of current personal life       Relevant Elements of Neurological Exam: normal gait        Psychiatric history:   prior inpatient treatment, psychotropic management by PCP and has participated in counseling/psychotherapy on an outpatient basis in the past  [facility] sometimes helped her " "d;during depressive,  2 years ago    Previous Suicide Attempt: denies    Previous Psychotropic Medications:  Current Effexor  mg; Wellbutrin - headaches.  States lexapro " swung me wildly."  She had panic attacks and crying episodes.  Zoloft, another ssri she can't recall.      Medical history:  none      Family history of psychiatric illness: mother has anxiety; " al sister and brothers have anxiety."  But not treated.    Social history (marriage, employment, etc.): works for the [employer] for 7 years, ; no children and no marriage.  Works in [employer] and lives in [place].  Raised by her biological mother and step-dad adopted her.  Some contact with father. Parents living.  Has one sister and 2 brothers she grew up with.  Lives with sister      History of Physical or Sexual Abuse:  denies history or current physical/sexual abuse      Support System: states sister; but not very good support.    Substance Abuse History:     Use of Alcohol: drinks socially, no concerns  Drinks 2-3 drinks a week      Laboratory Data  No visits with results within 1 Month(s) from this visit.   Latest known visit with results is:   Lab Visit on 02/11/2020   Component Date Value Ref Range Status    WBC 02/11/2020 9.23  3.90 - 12.70 K/uL Final    RBC 02/11/2020 4.32  4.00 - 5.40 M/uL Final    Hemoglobin 02/11/2020 12.5  12.0 - 16.0 g/dL Final    Hematocrit 02/11/2020 40.7  37.0 - 48.5 % Final    Mean Corpuscular Volume 02/11/2020 94  82 - 98 fL Final    Mean Corpuscular Hemoglobin 02/11/2020 28.9  27.0 - 31.0 pg Final    Mean Corpuscular Hemoglobin Conc 02/11/2020 30.7* 32.0 - 36.0 g/dL Final    RDW 02/11/2020 12.5  11.5 - 14.5 % Final    Platelets 02/11/2020 292  150 - 350 K/uL Final    MPV 02/11/2020 11.8  9.2 - 12.9 fL Final    Immature Granulocytes 02/11/2020 0.2  0.0 - 0.5 % Final    Gran # (ANC) 02/11/2020 5.5  1.8 - 7.7 K/uL Final    Immature Grans (Abs) 02/11/2020 0.02  0.00 - 0.04 K/uL Final    " Lymph # 02/11/2020 2.9  1.0 - 4.8 K/uL Final    Mono # 02/11/2020 0.7  0.3 - 1.0 K/uL Final    Eos # 02/11/2020 0.1  0.0 - 0.5 K/uL Final    Baso # 02/11/2020 0.03  0.00 - 0.20 K/uL Final    nRBC 02/11/2020 0  0 /100 WBC Final    Gran% 02/11/2020 59.7  38.0 - 73.0 % Final    Lymph% 02/11/2020 31.7  18.0 - 48.0 % Final    Mono% 02/11/2020 7.2  4.0 - 15.0 % Final    Eosinophil% 02/11/2020 0.9  0.0 - 8.0 % Final    Basophil% 02/11/2020 0.3  0.0 - 1.9 % Final    Differential Method 02/11/2020 Automated   Final    Sodium 02/11/2020 138  136 - 145 mmol/L Final    Potassium 02/11/2020 4.3  3.5 - 5.1 mmol/L Final    Chloride 02/11/2020 101  95 - 110 mmol/L Final    CO2 02/11/2020 26  23 - 29 mmol/L Final    Glucose 02/11/2020 70  70 - 110 mg/dL Final    BUN, Bld 02/11/2020 12  6 - 20 mg/dL Final    Creatinine 02/11/2020 0.8  0.5 - 1.4 mg/dL Final    Calcium 02/11/2020 9.3  8.7 - 10.5 mg/dL Final    Total Protein 02/11/2020 8.1  6.0 - 8.4 g/dL Final    Albumin 02/11/2020 4.2  3.5 - 5.2 g/dL Final    Total Bilirubin 02/11/2020 0.3  0.1 - 1.0 mg/dL Final    Alkaline Phosphatase 02/11/2020 73  55 - 135 U/L Final    AST 02/11/2020 21  10 - 40 U/L Final    ALT 02/11/2020 18  10 - 44 U/L Final    Anion Gap 02/11/2020 11  8 - 16 mmol/L Final    eGFR if African American 02/11/2020 >60.0  >60 mL/min/1.73 m^2 Final    eGFR if non African American 02/11/2020 >60.0  >60 mL/min/1.73 m^2 Final    Cholesterol 02/11/2020 256* 120 - 199 mg/dL Final    Triglycerides 02/11/2020 166* 30 - 150 mg/dL Final    HDL 02/11/2020 72  40 - 75 mg/dL Final    LDL Cholesterol 02/11/2020 150.8  63.0 - 159.0 mg/dL Final    Hdl/Cholesterol Ratio 02/11/2020 28.1  20.0 - 50.0 % Final    Total Cholesterol/HDL Ratio 02/11/2020 3.6  2.0 - 5.0 Final    Non-HDL Cholesterol 02/11/2020 184  mg/dL Final    TSH 02/11/2020 2.783  0.400 - 4.000 uIU/mL Final    Vit D, 25-Hydroxy 02/11/2020 17* 30 - 96 ng/mL Final    Hemoglobin A1C  02/11/2020 5.5  4.0 - 5.6 % Final    Estimated Avg Glucose 02/11/2020 111  68 - 131 mg/dL Final         Medications  Outpatient Encounter Medications as of 4/21/2020   Medication Sig Dispense Refill    cholecalciferol, vitamin D3, 2,000 unit Cap Take 1 capsule (2,000 Units total) by mouth once daily. 100 capsule 6    ergocalciferol (ERGOCALCIFEROL) 50,000 unit Cap TAKE 1 CAPSULE BY MOUTH ONCE A WEEK 4 capsule 3    venlafaxine (EFFEXOR-XR) 75 MG 24 hr capsule Take 3 capsules (225 mg total) by mouth once daily. 270 capsule 3     No facility-administered encounter medications on file as of 4/21/2020.              Assessment - Diagnosis - Goals:     Impression:     Encounter Diagnoses   Name Primary?    Anxiety and depression     Moderate episode of recurrent major depressive disorder Yes         Strengths and Liabilities: Strength: Patient accepts guidance/feedback, Strength: Patient is expressive/articulate., Strength: Patient is intelligent., Liability: Patient has no suport network., Liability: Patient lacks coping skills.    Treatment Goals:  Specify outcomes written in observable, behavioral terms:   Anxiety: reducing negative automatic thoughts and reducing physical symptoms of anxiety  Depression: increasing energy, increasing motivation and reducing fatigue    Treatment Plan/Recommendations:   · Medication Management: Continue current medications. The risks and benefits of medication were discussed with the patient. Add abilify 2 mg  · Referral for further treatment to social work team for psychotherapy  · Labs: obtain LIPID, CBC, COMP  in 1 month as she just had these labs in Feb.   · The treatment plan and follow up plan were reviewed with the patient.  Will add Abilify 2 mg to current dosage of Effexor XR; reviewed possible alternatives if Abilify does not work.    She does have some focus and concentration when administered the ADHD self report; may consider adding Vyvance  Moderate episode of  recurrent major depressive disorder    Anxiety and depression  -     Ambulatory referral/consult to Psychiatry      .    Return to Clinic: 2 weeks    Reviewed pathophysiology of depression and anxiety. Discussed rationale behind treatment. Reviewed treatment options, including medication and psychotherapy. Reviewed pharmacology including onset of action, dosing, side effects, adverse reactions and duration of therapy (6-12 months).  Discussed the need to stay on medication if it is effective and not discontinue after a few weeks due to the probability of relapse.    Pt expressed appreciation for the visit today and did not have further question at this time though pt  was still informed to:     Call / Walk In if problems.    Call Report Side Effects to Psych MD     Encouraged to follow up with primary care / Gen Med MD for continued monitoring of general health and wellness.    Call 911  Or go to ER if Acute Concerns (especially if any thoughts of harm to self or other).     Understanding was expressed; and no further concerns nor questions were raised at this time.

## 2020-05-05 ENCOUNTER — OFFICE VISIT (OUTPATIENT)
Dept: PSYCHIATRY | Facility: CLINIC | Age: 40
End: 2020-05-05
Payer: COMMERCIAL

## 2020-05-05 DIAGNOSIS — F32.A ANXIETY AND DEPRESSION: ICD-10-CM

## 2020-05-05 DIAGNOSIS — F33.1 MODERATE EPISODE OF RECURRENT MAJOR DEPRESSIVE DISORDER: Primary | ICD-10-CM

## 2020-05-05 DIAGNOSIS — F41.9 ANXIETY AND DEPRESSION: ICD-10-CM

## 2020-05-05 DIAGNOSIS — F40.10 SOCIAL ANXIETY DISORDER: ICD-10-CM

## 2020-05-05 PROCEDURE — 99212 PR OFFICE/OUTPT VISIT, EST, LEVL II, 10-19 MIN: ICD-10-PCS | Mod: 95,,, | Performed by: NURSE PRACTITIONER

## 2020-05-05 PROCEDURE — 99212 OFFICE O/P EST SF 10 MIN: CPT | Mod: 95,,, | Performed by: NURSE PRACTITIONER

## 2020-05-05 RX ORDER — VENLAFAXINE HYDROCHLORIDE 75 MG/1
225 CAPSULE, EXTENDED RELEASE ORAL DAILY
Qty: 270 CAPSULE | Refills: 3 | Status: SHIPPED | OUTPATIENT
Start: 2020-05-05 | End: 2020-06-02 | Stop reason: SDUPTHER

## 2020-05-05 RX ORDER — ARIPIPRAZOLE 2 MG/1
2 TABLET ORAL DAILY
Qty: 30 TABLET | Refills: 1 | Status: SHIPPED | OUTPATIENT
Start: 2020-05-05 | End: 2020-06-02 | Stop reason: SDUPTHER

## 2020-05-05 NOTE — PATIENT INSTRUCTIONS
Understanding Social Phobia (Social Anxiety Disorder)     Talk to your healthcare provider about treatment for social phobia.     You have to give a presentation next week. Just thinking about it makes your heart race. Your throat gets tight, and you can hardly breathe. Sometimes, you even feel faint. Speaking in front of a group makes most people nervous, but your fear is beyond reason. This is nothing to be ashamed of. You may have an anxiety disorder known as social phobia. Talk to your doctor or mental health professional. He or she can offer treatment and support.  What is social phobia?  Social phobia (also called social anxiety disorder) is an intense fear of being humiliated in a social or work setting. Even if you realize that your worries are irrational, you still expect people to  and think poorly of you. To avoid the anxiety, you may stay away from group settings. This avoidance can create problems with relationships, your job, and many other parts of your life. And it can make life much less enjoyable.  What causes it?  The exact cause of social phobia isnt known. It may run in families. The balance of certain chemicals in your brain may also play a role. And an embarrassing or traumatic event may trigger social phobias in some people. Sometimes, the social phobia may not appear until years after this event.  How does social phobia feel?  People with social phobia are very conscious of how others see them. In a social setting, symptoms may include:  · Self-conscious thoughts (You may think all eyes are on you)  · Shakiness, nervousness, or a trembling voice  · Sweating and blushing  · An increased heartbeat  · Shortness of breath  · A headache or stomachache  · Muscle tension  · A dry mouth  Getting help  Asking for help may be very hard for you, but the effort will be worth it. Certain medicines may lessen your symptoms. And behavioral therapy may help you conquer your fears. This involves  working with your therapist to learn how to relax when you feel anxious. Educate yourself about social phobias and keep track of helpful online resources and books you can use during stressful periods. You may also want to try stress management techniques such as meditation, and consider online or in person support groups. Youll also slowly start to confront your fears. At first, you may just think about the situations that scare you. Later, you may face them in person. For instance, you might start by giving a speech in front of one friend, and then gradually in front of larger groups. With each step, youll become less afraid.   Date Last Reviewed: 1/1/2017  © 0562-3805 [employer]. [address] [place] [address], [place] [address]. All rights reserved. This information is not intended as a substitute for professional medical care. Always follow your healthcare professional's instructions.

## 2020-05-05 NOTE — PROGRESS NOTES
"Outpatient Psychiatry Follow-Up Visit (MD/NP)      Disclaimer: Evaluation and treatment is based on information presented to date. Any new information may affect assessment and findings.   The patient location is: home    Visit type: audiovisual  Total time spent with patient: 30 min  Each patient to whom he or she provides medical services by telemedicine is:  (1) informed of the relationship between the physician and patient and the respective role of any other health care provider with respect to management of the patient; and (2) notified that he or she may decline to receive medical services by telemedicine and may withdraw from such care at any time.                  Clinical Status of Patient:  Outpatient (Ambulatory)    Chief Complaint:  Francis Stafford is a 39 y.o. female who presents today for follow-up of depression and anxiety  Met with patient.      Interval History and Content of Current Session:  Interim Events/Subjective Report/Content of Current Session: states she did not notice any change with medication the first few days.  She feels the depression has been pushed down.  States she has trouble waking up.  She continues to " not want to wake up,she feels tired." and she continues to work late nights, weekends and " all different times" right now she feels it is her circadiam rythym pattern that is off.  States she went to sleep last night at 10 pm; she does not fall asleep until 1 am.  She had difficulty with sleep patterns for several years.  States she is not a morning person.  She states she does see an improvement in her depressive symptoms, but feels the therapy will help her.  She has made effort to wake herself with her alarm clock, make breakfast and go outside, ride her bike; skate; and sharing meals with friends ( dropping them off).  She feels this equates to improvement in depression.  She lives with her sister; however, states they are not close at all, and she does not share her " "feelings with her.  discussed the purpose of psychotherapy.  Her self-esteem appears low during assessment; and she uses negative phrases when referring to herself.  We discussed ways to boost her self-esteem through accomplishments,  even small ones. Identified that her bike riding, and " dusting off her skates" were all aimed at improving her physical and mental health.   Encouraged her to address some of her false beliefs - negative thinking - and toxic self- talk.   Encouraged patient to  identify and unlearn mental habits that may be causing problems, As these can be default mental reactions.  Identified ways she could encourage herself to get out of bed, not to sleep all day.  Continue with biking, skating, cooking.  Zoom calls or skype with friends to have face to face interaction.     Therapy in 2 weeks, first appointment.    Depressive Disorder: DENIES depressed mood, tired/fatigued.   Anxiety Disorder: none.   Manic Disorder: DENIES none.   Psychotic Disorder: DENIES none.   Substance Use:  DENIES none.   Physical or Sexual Abuse: DENIES none.       Review of Systems   · PSYCHIATRIC: Pertinant items are noted in the narrative.    Past Medical, Family and Social History: The patient's past medical, family and social history have been reviewed and updated as appropriate within the electronic medical record - see encounter notes.    Compliance: yes    Side effects: None    Risk Parameters:  Patient reports no suicidal ideation  Patient reports no homicidal ideation  Patient reports no self-injurious behavior  Patient reports no violent behavior    Exam (detailed: at least 9 elements; comprehensive: all 15 elements)   Constitutional  Vitals:  Most recent vital signs, dated greater than 90 days prior to this appointment, were reviewed.   Last 3 sets of Vitals    Vitals - 1 value per visit 12/4/2018 3/5/2019 2/11/2020   SYSTOLIC 116 118 126   DIASTOLIC 74 76 88   PULSE 86 80 103   TEMPERATURE 98.2 97.1 98.1 " "  RESPIRATIONS - - -   SPO2 99 98 98   Weight (lb) 186.07 188.49 198.63   Weight (kg) 84.4 85.5 90.1   HEIGHT 5' 6" 5' 6" 5' 6"   BODY MASS INDEX 30.03 30.42 32.06   VISIT REPORT - - -   Waist Measurements - - -   Pain Score  0 0 0          General:  unremarkable, age appropriate, casually dressed, neatly groomed     Musculoskeletal  Muscle Strength/Tone:  no spasicity, no rigidity, no cogwheeling, no flaccidity, no paratonia, no dyskinesia, no dystonia, no tremor, no tic, no choreoathetosis, no atrophy   Gait & Station:  non-ataxic     Psychiatric  Speech:  no latency; no press   Mood & Affect:  dysthymic  congruent and appropriate   Thought Process:  normal and logical   Associations:  intact   Thought Content:  normal, no suicidality, no homicidality, delusions, or paranoia   Insight:  intact, has awareness of illness   Judgement: behavior is adequate to circumstances, age appropriate   Orientation:  grossly intact   Memory: intact for content of interview   Language: grossly intact   Attention Span & Concentration:  able to focus   Fund of Knowledge:  intact and appropriate to age and level of education, familiar with aspects of current personal life     Assessment and Diagnosis   Status/Progress: Based on the examination today, the patient's problem(s) is/are adequately but not ideally controlled.  New problems have not been presented today.    General Impression:     Encounter Diagnoses   Name Primary?    Anxiety and depression     Moderate episode of recurrent major depressive disorder Yes    Social anxiety disorder          Intervention/Counseling/Treatment Plan   · Medication Management: The risks and benefits of medication were discussed with the patient.  · Continue current medication  · Maintain psychotherapy appt.      Reviewed pathophysiology of depression and anxiety. Discussed rationale behind treatment. Reviewed treatment options, including medication and psychotherapy. Reviewed pharmacology " including onset of action, dosing, side effects, adverse reactions and duration of therapy (6-12 months).  Discussed the need to stay on medication if it is effective and not discontinue after a few weeks due to the probability of relapse.    Pt expressed appreciation for the visit today and did not have further question at this time though pt  was still informed to:     Call / Walk In if problems.    Call Report Side Effects to Psyc MD     Encouraged to follow up with primary care / Gen Med MD for continued monitoring of general health and wellness.    Call 911  Or go to ER if Acute Concerns (especially if any thoughts of harm to self or other).     Understanding was expressed; and no further concerns nor questions were raised at this time.        Return to Clinic: 1 month

## 2020-05-18 ENCOUNTER — OFFICE VISIT (OUTPATIENT)
Dept: PSYCHIATRY | Facility: CLINIC | Age: 40
End: 2020-05-18
Payer: COMMERCIAL

## 2020-05-18 DIAGNOSIS — F40.10 SOCIAL ANXIETY DISORDER: ICD-10-CM

## 2020-05-18 DIAGNOSIS — F33.1 MDD (MAJOR DEPRESSIVE DISORDER), RECURRENT EPISODE, MODERATE: Primary | ICD-10-CM

## 2020-05-18 DIAGNOSIS — F41.0 PANIC ATTACKS: ICD-10-CM

## 2020-05-18 PROCEDURE — 90791 PSYCH DIAGNOSTIC EVALUATION: CPT | Mod: 95,,, | Performed by: PSYCHOLOGIST

## 2020-05-18 PROCEDURE — 90791 PR PSYCHIATRIC DIAGNOSTIC EVALUATION: ICD-10-PCS | Mod: 95,,, | Performed by: PSYCHOLOGIST

## 2020-05-18 NOTE — PROGRESS NOTES
Psychiatry Initial Visit (PhD/LCSW)  Diagnostic Interview - CPT 69858    Date: 5/18/2020    The patient location is: Patient reported that her location at the time of this visit was at work in the [facility]     Visit type: Virtual visit with synchronous audio and video     Each patient to whom he or she provides medical services by telemedicine is: (1) informed of the relationship between the physician and patient and the respective role of any other health care provider with respect to management of the patient; and (2) notified that he or she may decline to receive medical services by telemedicine and may withdraw from such care at any time.    Referral source: Yoli Ricardo NP    Clinical status of patient: Outpatient    Francis Stafford, a 39 y.o. female, for initial evaluation visit.  Met with patient.    Chief complaint/reason for encounter: depression and anxiety    History of present illness: Patient reported symptoms of depression and anxiety since childhood.  Symptoms include depressed mood, hopelessness, feeling of doom, social anxiety, diminished interest in previously enjoyable activities, low self esteem, sleep disturbance, fatigue, worthlessness/guilt, poor concentration, tearfulness, worries about how she is being perceived by others, over-thinks about her responses to others, and panic attacks.  She noted that she does not feel connected to people and she would like to have deeper friendships.  Patient denied history of self-harm behaviors.  Patient denied current suicidal and homicidal ideations.        Pain: noncontributory    Symptoms:  · Mood: depressed mood, diminished interest, insomnia, fatigue, worthlessness/guilt, poor concentration and tearfulness  · Anxiety: panic attacks and social phobia  · Substance abuse: denied  · Cognitive functioning: denied  · Health behaviors: noncontributory    Psychiatric history: has participated in counseling/psychotherapy on an outpatient basis in  "the past for two years (until she had to stop due to a problem with her insurance) and currently under psychiatric care    Medical history: none    Family history of psychiatric illness: none    Social history (marriage, employment, etc.): Patient reported growing up in [place], [place] living with her mother, stepfather, and siblings.  She was adopted by her stepfather at age [age] at which time she had yearly visitation with her biological father.  She currently has a distant relationship with her biological father.  She is the oldest of her mother's four children.  She has one sister and two brothers with whom she has "complicated relationships."  Patient currently lives with her younger sister, but "they act more like roommates rather than sisters."  She notes also having distant relationships with her brothers.  Patient reports having distant relationships with her parents as her parents were "mean and made her feel that she was not good enough" during her childhood.  She noted that she finds herself being angry at her parents even in conversations with them.  She denied history of trauma and abuse.  She graduated from high school.  Her highest level of education is a Bachelor's degree in [employer].  She currently works as a  and  for a newspaper.  She has never been  and has no children.         Substance use:   Alcohol: occasional - one to three times weekly; one to three drinks each time   Drugs: Marijuana in her early 20's   Tobacco: Cigarettes - 20 - 22 years old one cigarette per day   Caffeine: Coffee - one per day; Sodas - one 12 ounce can every other day; Tea - occasional    Current medications and drug reactions (include OTC, herbal): see medication list     Strengths and liabilities: Strength: Patient is expressive/articulate., Liability: Patient lacks coping skills.    Current Evaluation:     Mental Status Exam:  General Appearance:  unremarkable, age " appropriate   Speech: normal tone, normal rate, normal pitch, normal volume      Level of Cooperation: cooperative      Thought Processes: normal and logical   Mood: depressed      Thought Content: normal, no suicidality, no homicidality, delusions, or paranoia   Affect: congruent and appropriate   Orientation: Oriented x3   Fund of General Knowledge: intact and appropriate to age and level of education   Judgment & Insight: fair     Language  intact     Diagnostic Impression - Plan:       ICD-10-CM ICD-9-CM   1. MDD (major depressive disorder), recurrent episode, moderate F33.1 296.32   2. Social anxiety disorder F40.10 300.23   3. Panic attacks F41.0 300.01       Plan:individual psychotherapy and medication management by physician    Return to Clinic: 1 week    Length of Service (minutes): 45

## 2020-06-01 ENCOUNTER — OFFICE VISIT (OUTPATIENT)
Dept: PSYCHIATRY | Facility: CLINIC | Age: 40
End: 2020-06-01
Payer: COMMERCIAL

## 2020-06-01 DIAGNOSIS — F33.1 MDD (MAJOR DEPRESSIVE DISORDER), RECURRENT EPISODE, MODERATE: Primary | ICD-10-CM

## 2020-06-01 DIAGNOSIS — F40.10 SOCIAL ANXIETY DISORDER: ICD-10-CM

## 2020-06-01 DIAGNOSIS — F41.0 PANIC ATTACKS: ICD-10-CM

## 2020-06-01 PROCEDURE — 90834 PSYTX W PT 45 MINUTES: CPT | Mod: 95,,, | Performed by: PSYCHOLOGIST

## 2020-06-01 PROCEDURE — 90834 PR PSYCHOTHERAPY W/PATIENT, 45 MIN: ICD-10-PCS | Mod: 95,,, | Performed by: PSYCHOLOGIST

## 2020-06-01 NOTE — PROGRESS NOTES
Individual Psychotherapy (PhD/LCSW)    6/1/2020    Therapeutic Intervention: Met with patient.  Outpatient - Behavior modifying psychotherapy 45 min - CPT code 67915    The patient location is: Patient reported that her location at the time of this visit was at work in the [facility]     Visit type: Virtual visit with synchronous audio and video     Each patient to whom he or she provides medical services by telemedicine is: (1) informed of the relationship between the physician and patient and the respective role of any other health care provider with respect to management of the patient; and (2) notified that he or she may decline to receive medical services by telemedicine and may withdraw from such care at any time.    Chief complaint/reason for encounter: depression and anxiety     Interval history and content of current session: Patient presented casually dressed, alert, and oriented.  Patient reported experiencing depressed mood, hopelessness, feeling of doom, social anxiety, diminished interest in previously enjoyable activities, low self esteem, sleep disturbance, fatigue, worthlessness/guilt, poor concentration, tearfulness, worries about how she is being perceived by others, over-thinks about her responses to others, and panic attacks.  Patient denied current suicidal and homicidal ideations.  Explored triggers to patient's anxiety.  Patient reported that she feels stuck between three friends as two friends are now dating.  Active listening skills were used as patient described feeling stuck between three friends and feeling disappointed with her friend who has not been honest with her.  Patient exhibited good insight when she stated that she feels like she is losing her social support as these individuals were a large part of her support system.  Educated patient about using assertive communication skills to address her concerns and feelings with her friends.               Treatment  plan:  · Target symptoms: depression, anxiety   · Why chosen therapy is appropriate versus another modality: relevant to diagnosis  · Outcome monitoring methods: self-report  · Therapeutic intervention type: insight oriented psychotherapy, behavior modifying psychotherapy    Risk parameters:  Patient reports no suicidal ideation  Patient reports no homicidal ideation  Patient reports no self-injurious behavior  Patient reports no violent behavior    Verbal deficits: None    Patient's response to intervention:  The patient's response to intervention is accepting.    Progress toward goals and other mental status changes:  The patient's progress toward goals is fair .    Diagnosis:     ICD-10-CM ICD-9-CM   1. MDD (major depressive disorder), recurrent episode, moderate F33.1 296.32   2. Social anxiety disorder F40.10 300.23   3. Panic attacks F41.0 300.01       Plan:  individual psychotherapy and medication management by physician    Return to clinic: 1 week    Length of Service (minutes): 45

## 2020-06-02 ENCOUNTER — OFFICE VISIT (OUTPATIENT)
Dept: PSYCHIATRY | Facility: CLINIC | Age: 40
End: 2020-06-02
Payer: COMMERCIAL

## 2020-06-02 DIAGNOSIS — F33.1 MODERATE EPISODE OF RECURRENT MAJOR DEPRESSIVE DISORDER: Primary | ICD-10-CM

## 2020-06-02 DIAGNOSIS — F40.10 SOCIAL ANXIETY DISORDER: ICD-10-CM

## 2020-06-02 DIAGNOSIS — F41.9 ANXIETY AND DEPRESSION: ICD-10-CM

## 2020-06-02 DIAGNOSIS — F32.A ANXIETY AND DEPRESSION: ICD-10-CM

## 2020-06-02 PROCEDURE — 99213 OFFICE O/P EST LOW 20 MIN: CPT | Mod: 95,,, | Performed by: NURSE PRACTITIONER

## 2020-06-02 PROCEDURE — 99213 PR OFFICE/OUTPT VISIT, EST, LEVL III, 20-29 MIN: ICD-10-PCS | Mod: 95,,, | Performed by: NURSE PRACTITIONER

## 2020-06-02 RX ORDER — ARIPIPRAZOLE 2 MG/1
2 TABLET ORAL DAILY
Qty: 30 TABLET | Refills: 1 | Status: SHIPPED | OUTPATIENT
Start: 2020-06-02 | End: 2020-08-08 | Stop reason: SDUPTHER

## 2020-06-02 RX ORDER — VENLAFAXINE HYDROCHLORIDE 75 MG/1
225 CAPSULE, EXTENDED RELEASE ORAL DAILY
Qty: 270 CAPSULE | Refills: 3 | Status: SHIPPED | OUTPATIENT
Start: 2020-06-02 | End: 2021-05-13 | Stop reason: SDUPTHER

## 2020-06-02 NOTE — PROGRESS NOTES
"Outpatient Psychiatry Follow-Up Visit (MD/NP)      Disclaimer: Evaluation and treatment is based on information presented to date. Any new information may affect assessment and findings.     The patient location is home      Visit type: audiovisual    25 minutes of total time spent on the encounter, which includes face to face time and non-face to face time preparing to see the patient (eg, review of tests), Obtaining and/or reviewing separately obtained history, Documenting clinical information in the electronic or other health record, Independently interpreting results (not separately reported) and communicating results to the patient/family/caregiver, or Care coordination (not separately reported).         Each patient to whom he or she provides medical services by telemedicine is:  (1) informed of the relationship between the physician and patient and the respective role of any other health care provider with respect to management of the patient; and (2) notified that he or she may decline to receive medical services by telemedicine and may withdraw from such care at any time.            Clinical Status of Patient:  Outpatient (Ambulatory)    Chief Complaint:  Francis Stafford is a 39 y.o. female who presents today for follow-up of depression.  Met with patient.  smiling    Interval History and Content of Current Session:  Interim Events/Subjective Report/Content of Current Session: states she is feeling better.  " I feel normal"  She has less anxiety. States she had 2nd session yesterday with Dr. Lynn.  She will be seeing her on a biweekly basis.    States she was jittery at first when she first started taking it.  She does not have the jittery feelings anymore.  She was taking at night and was keeping her up.  She has moved the medication to the morning.  She feels more motivated and is wanting to get out of the house.  She has been cooking and reading.  She cooks breakfast and gets out of the bed which is " "something she would not do in the past. She no longer stays in bed until noon, but gets up early.   She is getting outside of the house, riding her bike and going for a walk.  She is laughing.  Reviewed previous labs with patient.  All wnl    Depressive Disorder: DENIES none.   Anxiety Disorder: hyperarousal symptoms, muscle tension, excessive worry.   Manic Disorder: DENIES none.   Psychotic Disorder: DENIES none.   Substance Use:  DENIES none.   Physical or Sexual Abuse: DENIES none.       Review of Systems   · PSYCHIATRIC: Pertinent items are noted in the narrative.    Past Medical, Family and Social History: The patient's past medical, family and social history have been reviewed and updated as appropriate within the electronic medical record - see encounter notes.    Compliance: yes    Side effects: None    Risk Parameters:  Patient reports no suicidal ideation  Patient reports no homicidal ideation  Patient reports no self-injurious behavior  Patient reports no violent behavior    Exam (detailed: at least 9 elements; comprehensive: all 15 elements)   Constitutional  Vitals:  Most recent vital signs, dated greater than 90 days prior to this appointment, were reviewed.   Last 3 sets of Vitals    Vitals - 1 value per visit 12/4/2018 3/5/2019 2/11/2020   SYSTOLIC 116 118 126   DIASTOLIC 74 76 88   PULSE 86 80 103   TEMPERATURE 98.2 97.1 98.1   RESPIRATIONS - - -   SPO2 99 98 98   Weight (lb) 186.07 188.49 198.63   Weight (kg) 84.4 85.5 90.1   HEIGHT 5' 6" 5' 6" 5' 6"   BODY MASS INDEX 30.03 30.42 32.06   VISIT REPORT - - -   Waist Measurements - - -   Pain Score  0 0 0          General:  unremarkable, age appropriate, casually dressed, neatly groomed     Musculoskeletal  Muscle Strength/Tone:  no spasicity, no rigidity, no cogwheeling, no flaccidity, no paratonia, no dyskinesia, no dystonia, no tremor, no tic, no choreoathetosis, no atrophy   Gait & Station:  non-ataxic     Psychiatric  Speech:  no latency; no " press   Mood & Affect:  happy  congruent and appropriate   Thought Process:  normal and logical, abstract, goal-directed   Associations:  intact   Thought Content:  normal, no suicidality, no homicidality, delusions, or paranoia   Insight:  intact, has awareness of illness   Judgement: behavior is adequate to circumstances, age appropriate   Orientation:  grossly intact   Memory: intact for content of interview   Language: grossly intact   Attention Span & Concentration:  able to focus   Fund of Knowledge:  intact and appropriate to age and level of education, familiar with aspects of current personal life     Assessment and Diagnosis   Status/Progress: Based on the examination today, the patient's problem(S) is/are improved.  New problems have not been presented today.     General Impression:     Encounter Diagnoses   Name Primary?    Anxiety and depression     Moderate episode of recurrent major depressive disorder Yes    Social anxiety disorder          Intervention/Counseling/Treatment Plan   · Medication Management: The risks and benefits of medication were discussed with the patient.  · Continue current medications  · Continue biweekly therapy  · Will obtain CBC with diff at next visit as she had one recently and will use this for baseline.    Reviewed pathophysiology of depression and anxiety. Discussed rationale behind treatment. Reviewed treatment options, including medication and psychotherapy. Reviewed pharmacology including onset of action, dosing, side effects, adverse reactions and duration of therapy (6-12 months).  Discussed the need to stay on medication if it is effective and not discontinue after a few weeks due to the probability of relapse.    Pt expressed appreciation for the visit today and did not have further question at this time though pt  was still informed to:     Call / Walk In if problems.    Call Report Side Effects to Psych MD     Encouraged to follow up with primary care / Gen  Med MD for continued monitoring of general health and wellness.    Call 911  Or go to ER if Acute Concerns (especially if any thoughts of harm to self or other).     Understanding was expressed; and no further concerns nor questions were raised at this time.        Return to Clinic: 1 month

## 2020-06-02 NOTE — PATIENT INSTRUCTIONS
Understanding Anxiety Disorders  Almost everyone gets nervous now and then. Its normal to have knots in your stomach before a test, or for your heart to race on a first date. But an anxiety disorder is much more than a case of nerves. In fact, its symptoms may be overwhelming. But treatment can relieve many of these symptoms. Talking to your healthcare provider is the first step.    What are anxiety disorders?  An anxiety disorder causes intense feelings of panic and fear. These feelings may arise for no apparent reason. And they tend to recur again and again. They may prevent you from coping with life and cause you great distress. As a result, you may avoid anything that triggers your fear. In extreme cases, you may never leave the house. Anxiety disorders may cause other symptoms, such as:  · Obsessive thoughts you cant control  · Constant nightmares or painful thoughts of the past  · Nausea, sweating, and muscle tension  · Trouble sleeping or concentrating  What causes anxiety disorders?  Anxiety disorders tend to run in families. For some people, childhood abuse or neglect may play a role. For others, stressful life events or trauma may trigger anxiety disorders. Anxiety can trigger low self-esteem and poor coping skills.  Common anxiety disorders  · Panic disorder. This causes an intense fear of being in danger.  · Phobias. These are extreme fears of certain objects, places, or events.  · Obsessive-compulsive disorder. This causes you to have unwanted thoughts and urges. You also may perform certain actions over and over.  · Posttraumatic stress disorder. This occurs in people who have survived a terrible ordeal. It can cause nightmares and flashbacks about the event.  · Generalized anxiety disorder. This causes constant worry that can greatly disrupt your life.   Getting better  You may believe that nothing can help you. Or, you might fear what others may think. But most anxiety symptoms can be eased.  Having an anxiety disorder is nothing to be ashamed of. Most people do best with treatment that combines medicine and therapy. These arent cures. But they can help you live a healthier life.  Date Last Reviewed: 2/1/2017  © 3290-4259 [employer]. [address] [place] [address], [place] [address]. All rights reserved. This information is not intended as a substitute for professional medical care. Always follow your healthcare professional's instructions.

## 2020-06-15 ENCOUNTER — OFFICE VISIT (OUTPATIENT)
Dept: PSYCHIATRY | Facility: CLINIC | Age: 40
End: 2020-06-15
Payer: COMMERCIAL

## 2020-06-15 DIAGNOSIS — F33.1 MDD (MAJOR DEPRESSIVE DISORDER), RECURRENT EPISODE, MODERATE: Primary | ICD-10-CM

## 2020-06-15 DIAGNOSIS — F41.0 PANIC ATTACKS: ICD-10-CM

## 2020-06-15 DIAGNOSIS — F40.10 SOCIAL ANXIETY DISORDER: ICD-10-CM

## 2020-06-15 PROCEDURE — 90834 PR PSYCHOTHERAPY W/PATIENT, 45 MIN: ICD-10-PCS | Mod: 95,,, | Performed by: PSYCHOLOGIST

## 2020-06-15 PROCEDURE — 90834 PSYTX W PT 45 MINUTES: CPT | Mod: 95,,, | Performed by: PSYCHOLOGIST

## 2020-06-15 NOTE — PROGRESS NOTES
Individual Psychotherapy (PhD/LCSW)    6/15/2020    Therapeutic Intervention: Met with patient.  Outpatient - Behavior modifying psychotherapy 45 min - CPT code 30937    The patient location is: Patient reported that her location at the time of this visit was in the [facility]     Visit type: Audio Only    Each patient to whom he or she provides medical services by telemedicine is: (1) informed of the relationship between the physician and patient and the respective role of any other health care provider with respect to management of the patient; and (2) notified that he or she may decline to receive medical services by telemedicine and may withdraw from such care at any time.    Chief complaint/reason for encounter: depression and anxiety     Interval history and content of current session: Patient presented casually dressed, alert, and oriented.  Patient reported experiencing depressed mood, hopelessness, feeling of doom, social anxiety, diminished interest in previously enjoyable activities, low self esteem, sleep disturbance, fatigue, worthlessness/guilt, poor concentration, tearfulness, worries about how she is being perceived by others, over-thinks about her responses to others, and panic attacks.  Patient denied current suicidal and homicidal ideations.  Explored source of patient's depressed mood.  Patient reported that she would like to be in a committed relationship.  Active listening skills were used as patient described her relationship history and concerns about whether she will ever be in a long term relationship.  Assisted patient in exploring her expectations of a partner and a relationship.     Treatment plan:  · Target symptoms: depression, anxiety   · Why chosen therapy is appropriate versus another modality: relevant to diagnosis  · Outcome monitoring methods: self-report  · Therapeutic intervention type: insight oriented psychotherapy, behavior modifying psychotherapy    Risk  parameters:  Patient reports no suicidal ideation  Patient reports no homicidal ideation  Patient reports no self-injurious behavior  Patient reports no violent behavior    Verbal deficits: None    Patient's response to intervention:  The patient's response to intervention is accepting.    Progress toward goals and other mental status changes:  The patient's progress toward goals is fair .    Diagnosis:     ICD-10-CM ICD-9-CM   1. MDD (major depressive disorder), recurrent episode, moderate  F33.1 296.32   2. Social anxiety disorder  F40.10 300.23   3. Panic attacks  F41.0 300.01       Plan:  individual psychotherapy and medication management by physician    Return to clinic: 1 week    Length of Service (minutes): 45

## 2020-06-17 NOTE — PROGRESS NOTES
Patient ID: Francis Stafford is a 37 y.o. female.    Chief Complaint: Pain of the Right Shoulder      HPI: Francis Stafford  is a 37 y.o. female who c/o Pain of the Right Shoulder   for duration of over a year.  I last saw her a month ago.  At that time, I diagnosed her with calcific tendinitis of the right shoulder and got her started in physical therapy.  She comes in today for routine follow-up.  Pain level is 0/10 at present.  Quality is intermittent shooting pain.  Alleviating factors include therapy and meloxicam.  Aggravating factors include nothing at this time.  Functionally, she is not having any limitations.  She denies associated numbness and tingling.  She does have complaints of a stiffness within the shoulder.  Overall, she is happy with her progress.        Objective:        General    Nursing note and vitals reviewed.  Constitutional: She is oriented to person, place, and time. She appears well-developed and well-nourished.   HENT:   Head: Normocephalic and atraumatic.   Eyes: EOM are normal.   Cardiovascular: Normal rate and regular rhythm.    Pulmonary/Chest: Effort normal.   Abdominal: Soft.   Neurological: She is alert and oriented to person, place, and time.   Psychiatric: She has a normal mood and affect. Her behavior is normal.         Right Shoulder Exam     Inspection/Observation   Swelling: absent  Bruising: absent  Scars: absent  Deformity: absent    Range of Motion   Active Abduction: normal   Passive Abduction: normal   Extension: normal   Forward Flexion: normal   Forward Elevation: normal  Adduction: normal  External Rotation 0 degrees: normal   Internal Rotation 0 degrees: normal     Tests & Signs   Drop Arm: negative  Hawkin's test: negative  Impingement: negative  Active Compression Test ([place]'s Sign): negative  Speed's Test: negative    Other   Sensation: normal    Comments:  Minimal TTP over bicipital groove.    Muscle Strength   Right Upper Extremity   Shoulder Abduction: 5/5  Post-Op Week #1 - Cataract Surgery Left Eye (OS) -  Intraocular lens stable and surgery very well healed. Patient to resume all normal activities. Finish postop drops as directed. Final Refraction given if necessary. Call with any problems. 1 month RX   Shoulder Internal Rotation: 5/5   Shoulder External Rotation: 5/5     Vascular Exam     Right Pulses      Radial:                    2+      Capillary Refill  Right Hand: normal capillary refill        Assessment:       Encounter Diagnoses   Name Primary?    Calcific tendinitis of right shoulder Yes    Biceps tendinitis of right shoulder     Acute pain of right shoulder     DJD of right AC (acromioclavicular) joint           Plan:       Francis was seen today for pain.    Diagnoses and all orders for this visit:    Calcific tendinitis of right shoulder    Biceps tendinitis of right shoulder    Acute pain of right shoulder    DJD of right AC (acromioclavicular) joint    Francis is an established patient who comes in today for routine follow-up.  She is improved.  She is doing well.  She may progress to a home exercise program from physical therapy.  She can take over-the-counter anti-inflammatories as needed. She understands she has calcific tendonitis.  I have advised that the calcific tendinitis does not go away on its own.  The over time, it may worsen and she may have episodic flare up in her pain. However, she can continue activities as tolerated and will follow up with me on an as-needed basis.  She verbalizes understanding and agrees.  Follow-up if symptoms worsen or fail to improve.        The patient understands, chooses and consents to this plan and accepts all   the risks which include but are not limited to the risks mentioned above.     Disclaimer: This note was prepared using a voice recognition system and is likely to have sound alike errors within the text.

## 2021-03-03 ENCOUNTER — LAB VISIT (OUTPATIENT)
Dept: LAB | Facility: HOSPITAL | Age: 41
End: 2021-03-03
Attending: INTERNAL MEDICINE
Payer: COMMERCIAL

## 2021-03-03 ENCOUNTER — OFFICE VISIT (OUTPATIENT)
Dept: FAMILY MEDICINE | Facility: CLINIC | Age: 41
End: 2021-03-03
Payer: COMMERCIAL

## 2021-03-03 VITALS
TEMPERATURE: 98 F | HEART RATE: 110 BPM | HEIGHT: 66 IN | SYSTOLIC BLOOD PRESSURE: 122 MMHG | DIASTOLIC BLOOD PRESSURE: 82 MMHG | OXYGEN SATURATION: 100 % | WEIGHT: 208.56 LBS | BODY MASS INDEX: 33.52 KG/M2

## 2021-03-03 DIAGNOSIS — F41.9 ANXIETY AND DEPRESSION: ICD-10-CM

## 2021-03-03 DIAGNOSIS — R53.81 MALAISE AND FATIGUE: ICD-10-CM

## 2021-03-03 DIAGNOSIS — F41.1 GENERALIZED ANXIETY DISORDER WITH PANIC ATTACKS: ICD-10-CM

## 2021-03-03 DIAGNOSIS — R53.83 MALAISE AND FATIGUE: ICD-10-CM

## 2021-03-03 DIAGNOSIS — E55.9 VITAMIN D DEFICIENCY: ICD-10-CM

## 2021-03-03 DIAGNOSIS — F41.0 GENERALIZED ANXIETY DISORDER WITH PANIC ATTACKS: ICD-10-CM

## 2021-03-03 DIAGNOSIS — G44.209 TENSION HEADACHE: ICD-10-CM

## 2021-03-03 DIAGNOSIS — Z00.00 ENCOUNTER FOR PREVENTIVE HEALTH EXAMINATION: Primary | ICD-10-CM

## 2021-03-03 DIAGNOSIS — Z00.00 ENCOUNTER FOR PREVENTIVE HEALTH EXAMINATION: ICD-10-CM

## 2021-03-03 DIAGNOSIS — F32.A ANXIETY AND DEPRESSION: ICD-10-CM

## 2021-03-03 DIAGNOSIS — J30.1 SEASONAL ALLERGIC RHINITIS DUE TO POLLEN: ICD-10-CM

## 2021-03-03 PROCEDURE — 99395 PR PREVENTIVE VISIT,EST,18-39: ICD-10-PCS | Mod: S$GLB,,, | Performed by: INTERNAL MEDICINE

## 2021-03-03 PROCEDURE — 99999 PR PBB SHADOW E&M-EST. PATIENT-LVL III: CPT | Mod: PBBFAC,,, | Performed by: INTERNAL MEDICINE

## 2021-03-03 PROCEDURE — 1126F AMNT PAIN NOTED NONE PRSNT: CPT | Mod: S$GLB,,, | Performed by: INTERNAL MEDICINE

## 2021-03-03 PROCEDURE — 85025 COMPLETE CBC W/AUTO DIFF WBC: CPT | Performed by: INTERNAL MEDICINE

## 2021-03-03 PROCEDURE — 1126F PR PAIN SEVERITY QUANTIFIED, NO PAIN PRESENT: ICD-10-PCS | Mod: S$GLB,,, | Performed by: INTERNAL MEDICINE

## 2021-03-03 PROCEDURE — 99999 PR PBB SHADOW E&M-EST. PATIENT-LVL III: ICD-10-PCS | Mod: PBBFAC,,, | Performed by: INTERNAL MEDICINE

## 2021-03-03 PROCEDURE — 3008F BODY MASS INDEX DOCD: CPT | Mod: CPTII,S$GLB,, | Performed by: INTERNAL MEDICINE

## 2021-03-03 PROCEDURE — 82306 VITAMIN D 25 HYDROXY: CPT | Performed by: INTERNAL MEDICINE

## 2021-03-03 PROCEDURE — 99395 PREV VISIT EST AGE 18-39: CPT | Mod: S$GLB,,, | Performed by: INTERNAL MEDICINE

## 2021-03-03 PROCEDURE — 80053 COMPREHEN METABOLIC PANEL: CPT | Performed by: INTERNAL MEDICINE

## 2021-03-03 PROCEDURE — 3008F PR BODY MASS INDEX (BMI) DOCUMENTED: ICD-10-PCS | Mod: CPTII,S$GLB,, | Performed by: INTERNAL MEDICINE

## 2021-03-03 PROCEDURE — 84443 ASSAY THYROID STIM HORMONE: CPT | Performed by: INTERNAL MEDICINE

## 2021-03-03 PROCEDURE — 36415 COLL VENOUS BLD VENIPUNCTURE: CPT | Mod: PO | Performed by: INTERNAL MEDICINE

## 2021-03-03 RX ORDER — FLUTICASONE PROPIONATE 50 MCG
2 SPRAY, SUSPENSION (ML) NASAL DAILY
Qty: 16 G | Refills: 6 | Status: SHIPPED | OUTPATIENT
Start: 2021-03-03 | End: 2021-05-13

## 2021-03-04 ENCOUNTER — PATIENT MESSAGE (OUTPATIENT)
Dept: FAMILY MEDICINE | Facility: CLINIC | Age: 41
End: 2021-03-04

## 2021-03-04 LAB
25(OH)D3+25(OH)D2 SERPL-MCNC: 17 NG/ML (ref 30–96)
ALBUMIN SERPL BCP-MCNC: 4.1 G/DL (ref 3.5–5.2)
ALP SERPL-CCNC: 77 U/L (ref 55–135)
ALT SERPL W/O P-5'-P-CCNC: 27 U/L (ref 10–44)
ANION GAP SERPL CALC-SCNC: 12 MMOL/L (ref 8–16)
AST SERPL-CCNC: 25 U/L (ref 10–40)
BASOPHILS # BLD AUTO: 0.02 K/UL (ref 0–0.2)
BASOPHILS NFR BLD: 0.2 % (ref 0–1.9)
BILIRUB SERPL-MCNC: 0.2 MG/DL (ref 0.1–1)
BUN SERPL-MCNC: 13 MG/DL (ref 6–20)
CALCIUM SERPL-MCNC: 9.6 MG/DL (ref 8.7–10.5)
CHLORIDE SERPL-SCNC: 103 MMOL/L (ref 95–110)
CO2 SERPL-SCNC: 24 MMOL/L (ref 23–29)
CREAT SERPL-MCNC: 0.8 MG/DL (ref 0.5–1.4)
DIFFERENTIAL METHOD: ABNORMAL
EOSINOPHIL # BLD AUTO: 0.1 K/UL (ref 0–0.5)
EOSINOPHIL NFR BLD: 1.3 % (ref 0–8)
ERYTHROCYTE [DISTWIDTH] IN BLOOD BY AUTOMATED COUNT: 12.5 % (ref 11.5–14.5)
EST. GFR  (AFRICAN AMERICAN): >60 ML/MIN/1.73 M^2
EST. GFR  (NON AFRICAN AMERICAN): >60 ML/MIN/1.73 M^2
GLUCOSE SERPL-MCNC: 80 MG/DL (ref 70–110)
HCT VFR BLD AUTO: 39.6 % (ref 37–48.5)
HGB BLD-MCNC: 12.5 G/DL (ref 12–16)
IMM GRANULOCYTES # BLD AUTO: 0.02 K/UL (ref 0–0.04)
IMM GRANULOCYTES NFR BLD AUTO: 0.2 % (ref 0–0.5)
LYMPHOCYTES # BLD AUTO: 3 K/UL (ref 1–4.8)
LYMPHOCYTES NFR BLD: 31.3 % (ref 18–48)
MCH RBC QN AUTO: 28.9 PG (ref 27–31)
MCHC RBC AUTO-ENTMCNC: 31.6 G/DL (ref 32–36)
MCV RBC AUTO: 92 FL (ref 82–98)
MONOCYTES # BLD AUTO: 0.8 K/UL (ref 0.3–1)
MONOCYTES NFR BLD: 7.8 % (ref 4–15)
NEUTROPHILS # BLD AUTO: 5.7 K/UL (ref 1.8–7.7)
NEUTROPHILS NFR BLD: 59.2 % (ref 38–73)
NRBC BLD-RTO: 0 /100 WBC
PLATELET # BLD AUTO: 302 K/UL (ref 150–350)
PMV BLD AUTO: 12.1 FL (ref 9.2–12.9)
POTASSIUM SERPL-SCNC: 4.2 MMOL/L (ref 3.5–5.1)
PROT SERPL-MCNC: 8.4 G/DL (ref 6–8.4)
RBC # BLD AUTO: 4.33 M/UL (ref 4–5.4)
SODIUM SERPL-SCNC: 139 MMOL/L (ref 136–145)
TSH SERPL DL<=0.005 MIU/L-ACNC: 2.59 UIU/ML (ref 0.4–4)
WBC # BLD AUTO: 9.63 K/UL (ref 3.9–12.7)

## 2021-03-05 ENCOUNTER — PATIENT OUTREACH (OUTPATIENT)
Dept: ADMINISTRATIVE | Facility: OTHER | Age: 41
End: 2021-03-05

## 2021-03-08 ENCOUNTER — OFFICE VISIT (OUTPATIENT)
Dept: OBSTETRICS AND GYNECOLOGY | Facility: CLINIC | Age: 41
End: 2021-03-08
Payer: COMMERCIAL

## 2021-03-08 VITALS
WEIGHT: 210.31 LBS | SYSTOLIC BLOOD PRESSURE: 108 MMHG | HEIGHT: 66 IN | DIASTOLIC BLOOD PRESSURE: 66 MMHG | BODY MASS INDEX: 33.8 KG/M2

## 2021-03-08 DIAGNOSIS — Z00.00 ENCOUNTER FOR PREVENTIVE HEALTH EXAMINATION: ICD-10-CM

## 2021-03-08 DIAGNOSIS — Z12.4 ENCOUNTER FOR SCREENING FOR CERVICAL CANCER: ICD-10-CM

## 2021-03-08 DIAGNOSIS — Z01.419 WELL WOMAN EXAM WITH ROUTINE GYNECOLOGICAL EXAM: Primary | ICD-10-CM

## 2021-03-08 PROCEDURE — 3008F PR BODY MASS INDEX (BMI) DOCUMENTED: ICD-10-PCS | Mod: CPTII,S$GLB,, | Performed by: NURSE PRACTITIONER

## 2021-03-08 PROCEDURE — 99395 PREV VISIT EST AGE 18-39: CPT | Mod: S$GLB,,, | Performed by: NURSE PRACTITIONER

## 2021-03-08 PROCEDURE — 88175 CYTOPATH C/V AUTO FLUID REDO: CPT | Performed by: NURSE PRACTITIONER

## 2021-03-08 PROCEDURE — 1126F PR PAIN SEVERITY QUANTIFIED, NO PAIN PRESENT: ICD-10-PCS | Mod: S$GLB,,, | Performed by: NURSE PRACTITIONER

## 2021-03-08 PROCEDURE — 99999 PR PBB SHADOW E&M-EST. PATIENT-LVL III: CPT | Mod: PBBFAC,,, | Performed by: NURSE PRACTITIONER

## 2021-03-08 PROCEDURE — 87624 HPV HI-RISK TYP POOLED RSLT: CPT | Performed by: NURSE PRACTITIONER

## 2021-03-08 PROCEDURE — 1126F AMNT PAIN NOTED NONE PRSNT: CPT | Mod: S$GLB,,, | Performed by: NURSE PRACTITIONER

## 2021-03-08 PROCEDURE — 99999 PR PBB SHADOW E&M-EST. PATIENT-LVL III: ICD-10-PCS | Mod: PBBFAC,,, | Performed by: NURSE PRACTITIONER

## 2021-03-08 PROCEDURE — 99395 PR PREVENTIVE VISIT,EST,18-39: ICD-10-PCS | Mod: S$GLB,,, | Performed by: NURSE PRACTITIONER

## 2021-03-08 PROCEDURE — 3008F BODY MASS INDEX DOCD: CPT | Mod: CPTII,S$GLB,, | Performed by: NURSE PRACTITIONER

## 2021-03-14 ENCOUNTER — IMMUNIZATION (OUTPATIENT)
Dept: INTERNAL MEDICINE | Facility: CLINIC | Age: 41
End: 2021-03-14
Payer: COMMERCIAL

## 2021-03-14 DIAGNOSIS — Z23 NEED FOR VACCINATION: Primary | ICD-10-CM

## 2021-03-14 PROCEDURE — 0031A COVID-19,VECTOR-NR,RS-AD26,PF,0.5 ML DOSE VACCINE (JANSSEN): CPT | Mod: PBBFAC | Performed by: FAMILY MEDICINE

## 2021-03-16 LAB
CLINICAL INFO: ABNORMAL
CYTO CVX: ABNORMAL
CYTOLOGIST CVX/VAG CYTO: ABNORMAL
CYTOLOGY CMNT CVX/VAG CYTO-IMP: ABNORMAL
CYTOLOGY PAP THIN PREP EXPLANATION: ABNORMAL
DATE OF PREVIOUS PAP: ABNORMAL
DATE PREVIOUS BX: NO
GEN CATEG CVX/VAG CYTO-IMP: ABNORMAL
HPV I/H RISK 4 DNA CVX QL NAA+PROBE: NOT DETECTED
LMP START DATE: ABNORMAL
PATHOLOGIST CVX/VAG CYTO: ABNORMAL
SPECIMEN SOURCE CVX/VAG CYTO: ABNORMAL
STAT OF ADQ CVX/VAG CYTO-IMP: ABNORMAL

## 2021-04-22 ENCOUNTER — OFFICE VISIT (OUTPATIENT)
Dept: PSYCHIATRY | Facility: CLINIC | Age: 41
End: 2021-04-22
Payer: COMMERCIAL

## 2021-04-22 DIAGNOSIS — F41.0 PANIC ATTACKS: ICD-10-CM

## 2021-04-22 DIAGNOSIS — F33.1 MDD (MAJOR DEPRESSIVE DISORDER), RECURRENT EPISODE, MODERATE: Primary | ICD-10-CM

## 2021-04-22 DIAGNOSIS — F40.10 SOCIAL ANXIETY DISORDER: ICD-10-CM

## 2021-04-22 PROCEDURE — 90834 PR PSYCHOTHERAPY W/PATIENT, 45 MIN: ICD-10-PCS | Mod: 95,,, | Performed by: PSYCHOLOGIST

## 2021-04-22 PROCEDURE — 90834 PSYTX W PT 45 MINUTES: CPT | Mod: 95,,, | Performed by: PSYCHOLOGIST

## 2021-05-04 ENCOUNTER — TELEPHONE (OUTPATIENT)
Dept: PSYCHIATRY | Facility: CLINIC | Age: 41
End: 2021-05-04

## 2021-05-04 ENCOUNTER — OFFICE VISIT (OUTPATIENT)
Dept: PSYCHIATRY | Facility: CLINIC | Age: 41
End: 2021-05-04
Payer: COMMERCIAL

## 2021-05-04 DIAGNOSIS — F40.10 SOCIAL ANXIETY DISORDER: ICD-10-CM

## 2021-05-04 DIAGNOSIS — F41.0 PANIC ATTACKS: ICD-10-CM

## 2021-05-04 DIAGNOSIS — F33.1 MDD (MAJOR DEPRESSIVE DISORDER), RECURRENT EPISODE, MODERATE: Primary | ICD-10-CM

## 2021-05-04 PROCEDURE — 90834 PSYTX W PT 45 MINUTES: CPT | Mod: 95,,, | Performed by: PSYCHOLOGIST

## 2021-05-04 PROCEDURE — 90834 PR PSYCHOTHERAPY W/PATIENT, 45 MIN: ICD-10-PCS | Mod: 95,,, | Performed by: PSYCHOLOGIST

## 2021-05-13 ENCOUNTER — OFFICE VISIT (OUTPATIENT)
Dept: PSYCHIATRY | Facility: CLINIC | Age: 41
End: 2021-05-13
Payer: COMMERCIAL

## 2021-05-13 ENCOUNTER — LAB VISIT (OUTPATIENT)
Dept: LAB | Facility: HOSPITAL | Age: 41
End: 2021-05-13
Attending: NURSE PRACTITIONER
Payer: COMMERCIAL

## 2021-05-13 DIAGNOSIS — F41.1 GENERALIZED ANXIETY DISORDER: ICD-10-CM

## 2021-05-13 DIAGNOSIS — F32.1 CURRENT MODERATE EPISODE OF MAJOR DEPRESSIVE DISORDER, UNSPECIFIED WHETHER RECURRENT: Primary | ICD-10-CM

## 2021-05-13 DIAGNOSIS — Z79.899 LONG TERM CURRENT USE OF ANTIPSYCHOTIC MEDICATION: ICD-10-CM

## 2021-05-13 DIAGNOSIS — E55.9 VITAMIN D INSUFFICIENCY: ICD-10-CM

## 2021-05-13 DIAGNOSIS — F32.1 CURRENT MODERATE EPISODE OF MAJOR DEPRESSIVE DISORDER, UNSPECIFIED WHETHER RECURRENT: ICD-10-CM

## 2021-05-13 LAB
ESTIMATED AVG GLUCOSE: 117 MG/DL (ref 68–131)
HBA1C MFR BLD: 5.7 % (ref 4–5.6)

## 2021-05-13 PROCEDURE — 90792 PSYCH DIAG EVAL W/MED SRVCS: CPT | Mod: S$GLB,,, | Performed by: NURSE PRACTITIONER

## 2021-05-13 PROCEDURE — 80061 LIPID PANEL: CPT | Performed by: NURSE PRACTITIONER

## 2021-05-13 PROCEDURE — 99999 PR PBB SHADOW E&M-EST. PATIENT-LVL II: ICD-10-PCS | Mod: PBBFAC,,, | Performed by: NURSE PRACTITIONER

## 2021-05-13 PROCEDURE — 99999 PR PBB SHADOW E&M-EST. PATIENT-LVL II: CPT | Mod: PBBFAC,,, | Performed by: NURSE PRACTITIONER

## 2021-05-13 PROCEDURE — 82607 VITAMIN B-12: CPT | Performed by: NURSE PRACTITIONER

## 2021-05-13 PROCEDURE — 83036 HEMOGLOBIN GLYCOSYLATED A1C: CPT | Performed by: NURSE PRACTITIONER

## 2021-05-13 PROCEDURE — 90792 PR PSYCHIATRIC DIAGNOSTIC EVALUATION W/MEDICAL SERVICES: ICD-10-PCS | Mod: S$GLB,,, | Performed by: NURSE PRACTITIONER

## 2021-05-13 PROCEDURE — 36415 COLL VENOUS BLD VENIPUNCTURE: CPT | Mod: PN | Performed by: NURSE PRACTITIONER

## 2021-05-13 RX ORDER — QUETIAPINE FUMARATE 25 MG/1
25 TABLET, FILM COATED ORAL NIGHTLY
Qty: 30 TABLET | Refills: 0 | Status: SHIPPED | OUTPATIENT
Start: 2021-05-13 | End: 2021-06-04

## 2021-05-13 RX ORDER — VENLAFAXINE HYDROCHLORIDE 150 MG/1
150 CAPSULE, EXTENDED RELEASE ORAL DAILY
Qty: 90 CAPSULE | Refills: 0 | Status: SHIPPED | OUTPATIENT
Start: 2021-05-13 | End: 2021-07-06 | Stop reason: SDUPTHER

## 2021-05-13 RX ORDER — VENLAFAXINE HYDROCHLORIDE 75 MG/1
75 CAPSULE, EXTENDED RELEASE ORAL DAILY
Qty: 90 CAPSULE | Refills: 0 | Status: SHIPPED | OUTPATIENT
Start: 2021-05-13 | End: 2021-07-06 | Stop reason: SDUPTHER

## 2021-05-14 LAB
CHOLEST SERPL-MCNC: 247 MG/DL (ref 120–199)
CHOLEST/HDLC SERPL: 4 {RATIO} (ref 2–5)
HDLC SERPL-MCNC: 61 MG/DL (ref 40–75)
HDLC SERPL: 24.7 % (ref 20–50)
LDLC SERPL CALC-MCNC: 168.6 MG/DL (ref 63–159)
NONHDLC SERPL-MCNC: 186 MG/DL
TRIGL SERPL-MCNC: 87 MG/DL (ref 30–150)
VIT B12 SERPL-MCNC: 386 PG/ML (ref 210–950)

## 2021-06-04 ENCOUNTER — OFFICE VISIT (OUTPATIENT)
Dept: PSYCHIATRY | Facility: CLINIC | Age: 41
End: 2021-06-04
Payer: COMMERCIAL

## 2021-06-04 DIAGNOSIS — E55.9 VITAMIN D INSUFFICIENCY: ICD-10-CM

## 2021-06-04 DIAGNOSIS — F41.1 GENERALIZED ANXIETY DISORDER: ICD-10-CM

## 2021-06-04 DIAGNOSIS — F32.1 CURRENT MODERATE EPISODE OF MAJOR DEPRESSIVE DISORDER, UNSPECIFIED WHETHER RECURRENT: Primary | ICD-10-CM

## 2021-06-04 PROCEDURE — 99999 PR PBB SHADOW E&M-EST. PATIENT-LVL I: ICD-10-PCS | Mod: PBBFAC,,, | Performed by: NURSE PRACTITIONER

## 2021-06-04 PROCEDURE — 99999 PR PBB SHADOW E&M-EST. PATIENT-LVL I: CPT | Mod: PBBFAC,,, | Performed by: NURSE PRACTITIONER

## 2021-06-04 PROCEDURE — 99214 OFFICE O/P EST MOD 30 MIN: CPT | Mod: S$GLB,,, | Performed by: NURSE PRACTITIONER

## 2021-06-04 PROCEDURE — 99214 PR OFFICE/OUTPT VISIT, EST, LEVL IV, 30-39 MIN: ICD-10-PCS | Mod: S$GLB,,, | Performed by: NURSE PRACTITIONER

## 2021-06-04 RX ORDER — TRAZODONE HYDROCHLORIDE 50 MG/1
50 TABLET ORAL NIGHTLY
Qty: 30 TABLET | Refills: 0 | Status: SHIPPED | OUTPATIENT
Start: 2021-06-04 | End: 2021-07-06

## 2021-06-15 ENCOUNTER — OFFICE VISIT (OUTPATIENT)
Dept: PSYCHIATRY | Facility: CLINIC | Age: 41
End: 2021-06-15
Payer: COMMERCIAL

## 2021-06-15 DIAGNOSIS — F41.0 PANIC ATTACKS: ICD-10-CM

## 2021-06-15 DIAGNOSIS — F33.1 MDD (MAJOR DEPRESSIVE DISORDER), RECURRENT EPISODE, MODERATE: Primary | ICD-10-CM

## 2021-06-15 DIAGNOSIS — F40.10 SOCIAL ANXIETY DISORDER: ICD-10-CM

## 2021-06-15 PROCEDURE — 90834 PR PSYCHOTHERAPY W/PATIENT, 45 MIN: ICD-10-PCS | Mod: 95,,, | Performed by: PSYCHOLOGIST

## 2021-06-15 PROCEDURE — 90834 PSYTX W PT 45 MINUTES: CPT | Mod: 95,,, | Performed by: PSYCHOLOGIST

## 2021-07-06 ENCOUNTER — OFFICE VISIT (OUTPATIENT)
Dept: PSYCHIATRY | Facility: CLINIC | Age: 41
End: 2021-07-06
Payer: COMMERCIAL

## 2021-07-06 DIAGNOSIS — F32.1 CURRENT MODERATE EPISODE OF MAJOR DEPRESSIVE DISORDER, UNSPECIFIED WHETHER RECURRENT: Primary | ICD-10-CM

## 2021-07-06 DIAGNOSIS — E55.9 VITAMIN D INSUFFICIENCY: ICD-10-CM

## 2021-07-06 DIAGNOSIS — R06.83 SNORING: ICD-10-CM

## 2021-07-06 DIAGNOSIS — Z76.89 SLEEP CONCERN: ICD-10-CM

## 2021-07-06 DIAGNOSIS — F41.1 GENERALIZED ANXIETY DISORDER: ICD-10-CM

## 2021-07-06 PROCEDURE — 99214 OFFICE O/P EST MOD 30 MIN: CPT | Mod: 95,,, | Performed by: NURSE PRACTITIONER

## 2021-07-06 PROCEDURE — 99214 PR OFFICE/OUTPT VISIT, EST, LEVL IV, 30-39 MIN: ICD-10-PCS | Mod: 95,,, | Performed by: NURSE PRACTITIONER

## 2021-07-06 RX ORDER — VENLAFAXINE HYDROCHLORIDE 75 MG/1
75 CAPSULE, EXTENDED RELEASE ORAL DAILY
Qty: 90 CAPSULE | Refills: 0 | Status: SHIPPED | OUTPATIENT
Start: 2021-07-06 | End: 2021-11-24 | Stop reason: SDUPTHER

## 2021-07-06 RX ORDER — VENLAFAXINE HYDROCHLORIDE 150 MG/1
150 CAPSULE, EXTENDED RELEASE ORAL DAILY
Qty: 90 CAPSULE | Refills: 0 | Status: SHIPPED | OUTPATIENT
Start: 2021-07-06 | End: 2021-10-04

## 2021-07-06 RX ORDER — ARIPIPRAZOLE 5 MG/1
5 TABLET ORAL DAILY
Qty: 30 TABLET | Refills: 1 | Status: SHIPPED | OUTPATIENT
Start: 2021-07-06 | End: 2021-08-23 | Stop reason: SDUPTHER

## 2021-07-13 ENCOUNTER — TELEPHONE (OUTPATIENT)
Dept: PSYCHIATRY | Facility: CLINIC | Age: 41
End: 2021-07-13

## 2021-08-23 ENCOUNTER — PATIENT MESSAGE (OUTPATIENT)
Dept: PSYCHIATRY | Facility: CLINIC | Age: 41
End: 2021-08-23

## 2021-08-23 RX ORDER — ARIPIPRAZOLE 5 MG/1
5 TABLET ORAL DAILY
Qty: 30 TABLET | Refills: 1 | Status: SHIPPED | OUTPATIENT
Start: 2021-08-23 | End: 2021-10-22

## 2021-09-07 ENCOUNTER — OFFICE VISIT (OUTPATIENT)
Dept: FAMILY MEDICINE | Facility: CLINIC | Age: 41
End: 2021-09-07
Payer: COMMERCIAL

## 2021-09-07 VITALS
HEIGHT: 66 IN | SYSTOLIC BLOOD PRESSURE: 126 MMHG | RESPIRATION RATE: 18 BRPM | HEART RATE: 84 BPM | WEIGHT: 207.25 LBS | TEMPERATURE: 98 F | OXYGEN SATURATION: 99 % | DIASTOLIC BLOOD PRESSURE: 82 MMHG | BODY MASS INDEX: 33.31 KG/M2

## 2021-09-07 DIAGNOSIS — F41.1 GENERALIZED ANXIETY DISORDER WITH PANIC ATTACKS: ICD-10-CM

## 2021-09-07 DIAGNOSIS — R73.03 PREDIABETES: Primary | ICD-10-CM

## 2021-09-07 DIAGNOSIS — F32.A ANXIETY AND DEPRESSION: ICD-10-CM

## 2021-09-07 DIAGNOSIS — Z29.9 PREVENTIVE MEASURE: ICD-10-CM

## 2021-09-07 DIAGNOSIS — F41.0 GENERALIZED ANXIETY DISORDER WITH PANIC ATTACKS: ICD-10-CM

## 2021-09-07 DIAGNOSIS — G44.209 TENSION HEADACHE: ICD-10-CM

## 2021-09-07 DIAGNOSIS — R53.81 MALAISE AND FATIGUE: ICD-10-CM

## 2021-09-07 DIAGNOSIS — F41.9 ANXIETY AND DEPRESSION: ICD-10-CM

## 2021-09-07 DIAGNOSIS — E55.9 VITAMIN D DEFICIENCY: ICD-10-CM

## 2021-09-07 DIAGNOSIS — R53.83 MALAISE AND FATIGUE: ICD-10-CM

## 2021-09-07 PROCEDURE — 1159F PR MEDICATION LIST DOCUMENTED IN MEDICAL RECORD: ICD-10-PCS | Mod: CPTII,S$GLB,, | Performed by: INTERNAL MEDICINE

## 2021-09-07 PROCEDURE — 1160F RVW MEDS BY RX/DR IN RCRD: CPT | Mod: CPTII,S$GLB,, | Performed by: INTERNAL MEDICINE

## 2021-09-07 PROCEDURE — 99999 PR PBB SHADOW E&M-EST. PATIENT-LVL III: ICD-10-PCS | Mod: PBBFAC,,, | Performed by: INTERNAL MEDICINE

## 2021-09-07 PROCEDURE — 3044F HG A1C LEVEL LT 7.0%: CPT | Mod: CPTII,S$GLB,, | Performed by: INTERNAL MEDICINE

## 2021-09-07 PROCEDURE — 99999 PR PBB SHADOW E&M-EST. PATIENT-LVL III: CPT | Mod: PBBFAC,,, | Performed by: INTERNAL MEDICINE

## 2021-09-07 PROCEDURE — 3074F SYST BP LT 130 MM HG: CPT | Mod: CPTII,S$GLB,, | Performed by: INTERNAL MEDICINE

## 2021-09-07 PROCEDURE — 3008F PR BODY MASS INDEX (BMI) DOCUMENTED: ICD-10-PCS | Mod: CPTII,S$GLB,, | Performed by: INTERNAL MEDICINE

## 2021-09-07 PROCEDURE — 1159F MED LIST DOCD IN RCRD: CPT | Mod: CPTII,S$GLB,, | Performed by: INTERNAL MEDICINE

## 2021-09-07 PROCEDURE — 1160F PR REVIEW ALL MEDS BY PRESCRIBER/CLIN PHARMACIST DOCUMENTED: ICD-10-PCS | Mod: CPTII,S$GLB,, | Performed by: INTERNAL MEDICINE

## 2021-09-07 PROCEDURE — 99214 OFFICE O/P EST MOD 30 MIN: CPT | Mod: S$GLB,,, | Performed by: INTERNAL MEDICINE

## 2021-09-07 PROCEDURE — 3079F PR MOST RECENT DIASTOLIC BLOOD PRESSURE 80-89 MM HG: ICD-10-PCS | Mod: CPTII,S$GLB,, | Performed by: INTERNAL MEDICINE

## 2021-09-07 PROCEDURE — 3044F PR MOST RECENT HEMOGLOBIN A1C LEVEL <7.0%: ICD-10-PCS | Mod: CPTII,S$GLB,, | Performed by: INTERNAL MEDICINE

## 2021-09-07 PROCEDURE — 3074F PR MOST RECENT SYSTOLIC BLOOD PRESSURE < 130 MM HG: ICD-10-PCS | Mod: CPTII,S$GLB,, | Performed by: INTERNAL MEDICINE

## 2021-09-07 PROCEDURE — 99214 PR OFFICE/OUTPT VISIT, EST, LEVL IV, 30-39 MIN: ICD-10-PCS | Mod: S$GLB,,, | Performed by: INTERNAL MEDICINE

## 2021-09-07 PROCEDURE — 3079F DIAST BP 80-89 MM HG: CPT | Mod: CPTII,S$GLB,, | Performed by: INTERNAL MEDICINE

## 2021-09-07 PROCEDURE — 3008F BODY MASS INDEX DOCD: CPT | Mod: CPTII,S$GLB,, | Performed by: INTERNAL MEDICINE

## 2021-11-10 ENCOUNTER — IMMUNIZATION (OUTPATIENT)
Dept: INTERNAL MEDICINE | Facility: CLINIC | Age: 41
End: 2021-11-10
Payer: COMMERCIAL

## 2021-11-10 DIAGNOSIS — Z23 NEED FOR VACCINATION: Primary | ICD-10-CM

## 2021-11-10 PROCEDURE — 0064A COVID-19, MRNA, LNP-S, PF, 100 MCG/0.25 ML DOSE VACCINE (MODERNA BOOSTER): CPT | Mod: CV19,PBBFAC | Performed by: INTERNAL MEDICINE

## 2021-11-22 ENCOUNTER — PATIENT MESSAGE (OUTPATIENT)
Dept: PSYCHIATRY | Facility: CLINIC | Age: 41
End: 2021-11-22
Payer: COMMERCIAL

## 2021-11-23 ENCOUNTER — PATIENT MESSAGE (OUTPATIENT)
Dept: PSYCHIATRY | Facility: CLINIC | Age: 41
End: 2021-11-23

## 2021-11-24 ENCOUNTER — OFFICE VISIT (OUTPATIENT)
Dept: PSYCHIATRY | Facility: CLINIC | Age: 41
End: 2021-11-24
Payer: COMMERCIAL

## 2021-11-24 DIAGNOSIS — Z79.899 ON HIGH DOSE ANTIPSYCHOTIC DRUG THERAPY: ICD-10-CM

## 2021-11-24 DIAGNOSIS — E55.9 VITAMIN D INSUFFICIENCY: ICD-10-CM

## 2021-11-24 DIAGNOSIS — F32.1 CURRENT MODERATE EPISODE OF MAJOR DEPRESSIVE DISORDER, UNSPECIFIED WHETHER RECURRENT: Primary | ICD-10-CM

## 2021-11-24 DIAGNOSIS — F41.1 GENERALIZED ANXIETY DISORDER: ICD-10-CM

## 2021-11-24 PROCEDURE — 99215 PR OFFICE/OUTPT VISIT, EST, LEVL V, 40-54 MIN: ICD-10-PCS | Mod: 95,,, | Performed by: NURSE PRACTITIONER

## 2021-11-24 PROCEDURE — 99215 OFFICE O/P EST HI 40 MIN: CPT | Mod: 95,,, | Performed by: NURSE PRACTITIONER

## 2021-11-24 RX ORDER — ARIPIPRAZOLE 5 MG/1
5 TABLET ORAL DAILY
COMMUNITY
Start: 2021-10-17 | End: 2021-11-24 | Stop reason: SDUPTHER

## 2021-11-24 RX ORDER — VENLAFAXINE HYDROCHLORIDE 75 MG/1
75 CAPSULE, EXTENDED RELEASE ORAL DAILY
Qty: 90 CAPSULE | Refills: 3 | Status: SHIPPED | OUTPATIENT
Start: 2021-11-24 | End: 2022-11-17 | Stop reason: SDUPTHER

## 2021-11-24 RX ORDER — ARIPIPRAZOLE 5 MG/1
5 TABLET ORAL DAILY
Qty: 90 TABLET | Refills: 3 | Status: SHIPPED | OUTPATIENT
Start: 2021-11-24 | End: 2022-11-17 | Stop reason: SDUPTHER

## 2021-11-24 RX ORDER — VENLAFAXINE HYDROCHLORIDE 150 MG/1
150 CAPSULE, EXTENDED RELEASE ORAL DAILY
COMMUNITY
Start: 2021-08-10 | End: 2021-11-24 | Stop reason: SDUPTHER

## 2021-11-24 RX ORDER — VENLAFAXINE HYDROCHLORIDE 150 MG/1
150 CAPSULE, EXTENDED RELEASE ORAL DAILY
Qty: 90 CAPSULE | Refills: 3 | Status: SHIPPED | OUTPATIENT
Start: 2021-11-24 | End: 2022-11-17 | Stop reason: SDUPTHER

## 2021-11-30 ENCOUNTER — TELEPHONE (OUTPATIENT)
Dept: SLEEP MEDICINE | Facility: CLINIC | Age: 41
End: 2021-11-30
Payer: COMMERCIAL

## 2021-12-08 ENCOUNTER — PATIENT OUTREACH (OUTPATIENT)
Dept: ADMINISTRATIVE | Facility: OTHER | Age: 41
End: 2021-12-08
Payer: COMMERCIAL

## 2021-12-08 DIAGNOSIS — Z12.31 ENCOUNTER FOR SCREENING MAMMOGRAM FOR MALIGNANT NEOPLASM OF BREAST: Primary | ICD-10-CM

## 2021-12-15 ENCOUNTER — LAB VISIT (OUTPATIENT)
Dept: LAB | Facility: HOSPITAL | Age: 41
End: 2021-12-15
Attending: INTERNAL MEDICINE
Payer: COMMERCIAL

## 2021-12-15 DIAGNOSIS — F32.1 CURRENT MODERATE EPISODE OF MAJOR DEPRESSIVE DISORDER, UNSPECIFIED WHETHER RECURRENT: ICD-10-CM

## 2021-12-15 DIAGNOSIS — E55.9 VITAMIN D DEFICIENCY: ICD-10-CM

## 2021-12-15 DIAGNOSIS — R73.03 PREDIABETES: ICD-10-CM

## 2021-12-15 DIAGNOSIS — Z79.899 ON HIGH DOSE ANTIPSYCHOTIC DRUG THERAPY: ICD-10-CM

## 2021-12-15 LAB
25(OH)D3+25(OH)D2 SERPL-MCNC: 32 NG/ML (ref 30–96)
ALBUMIN SERPL BCP-MCNC: 3.6 G/DL (ref 3.5–5.2)
ALP SERPL-CCNC: 131 U/L (ref 55–135)
ALT SERPL W/O P-5'-P-CCNC: 118 U/L (ref 10–44)
ANION GAP SERPL CALC-SCNC: 10 MMOL/L (ref 8–16)
AST SERPL-CCNC: 38 U/L (ref 10–40)
BILIRUB SERPL-MCNC: 0.3 MG/DL (ref 0.1–1)
BUN SERPL-MCNC: 13 MG/DL (ref 6–20)
CALCIUM SERPL-MCNC: 9.6 MG/DL (ref 8.7–10.5)
CHLORIDE SERPL-SCNC: 100 MMOL/L (ref 95–110)
CHOLEST SERPL-MCNC: 224 MG/DL (ref 120–199)
CHOLEST/HDLC SERPL: 4.3 {RATIO} (ref 2–5)
CO2 SERPL-SCNC: 27 MMOL/L (ref 23–29)
CREAT SERPL-MCNC: 0.9 MG/DL (ref 0.5–1.4)
EST. GFR  (AFRICAN AMERICAN): >60 ML/MIN/1.73 M^2
EST. GFR  (NON AFRICAN AMERICAN): >60 ML/MIN/1.73 M^2
ESTIMATED AVG GLUCOSE: 114 MG/DL (ref 68–131)
ESTIMATED AVG GLUCOSE: 114 MG/DL (ref 68–131)
GLUCOSE SERPL-MCNC: 106 MG/DL (ref 70–110)
HBA1C MFR BLD: 5.6 % (ref 4–5.6)
HBA1C MFR BLD: 5.6 % (ref 4–5.6)
HDLC SERPL-MCNC: 52 MG/DL (ref 40–75)
HDLC SERPL: 23.2 % (ref 20–50)
LDLC SERPL CALC-MCNC: 143 MG/DL (ref 63–159)
NONHDLC SERPL-MCNC: 172 MG/DL
POTASSIUM SERPL-SCNC: 4.2 MMOL/L (ref 3.5–5.1)
PROT SERPL-MCNC: 7.6 G/DL (ref 6–8.4)
SODIUM SERPL-SCNC: 137 MMOL/L (ref 136–145)
TRIGL SERPL-MCNC: 145 MG/DL (ref 30–150)

## 2021-12-15 PROCEDURE — 80061 LIPID PANEL: CPT | Performed by: NURSE PRACTITIONER

## 2021-12-15 PROCEDURE — 82306 VITAMIN D 25 HYDROXY: CPT | Performed by: INTERNAL MEDICINE

## 2021-12-15 PROCEDURE — 36415 COLL VENOUS BLD VENIPUNCTURE: CPT | Mod: PN | Performed by: INTERNAL MEDICINE

## 2021-12-15 PROCEDURE — 83036 HEMOGLOBIN GLYCOSYLATED A1C: CPT | Performed by: INTERNAL MEDICINE

## 2021-12-15 PROCEDURE — 80053 COMPREHEN METABOLIC PANEL: CPT | Performed by: NURSE PRACTITIONER

## 2021-12-27 ENCOUNTER — OFFICE VISIT (OUTPATIENT)
Dept: SLEEP MEDICINE | Facility: CLINIC | Age: 41
End: 2021-12-27
Payer: COMMERCIAL

## 2021-12-27 VITALS — BODY MASS INDEX: 34.25 KG/M2 | WEIGHT: 212.19 LBS

## 2021-12-27 DIAGNOSIS — G47.10 HYPERSOMNIA: ICD-10-CM

## 2021-12-27 DIAGNOSIS — F33.1 MODERATE EPISODE OF RECURRENT MAJOR DEPRESSIVE DISORDER: ICD-10-CM

## 2021-12-27 DIAGNOSIS — G44.209 TENSION HEADACHE: ICD-10-CM

## 2021-12-27 DIAGNOSIS — R06.83 SNORING: ICD-10-CM

## 2021-12-27 DIAGNOSIS — E55.9 VITAMIN D DEFICIENCY: ICD-10-CM

## 2021-12-27 DIAGNOSIS — R73.03 PREDIABETES: ICD-10-CM

## 2021-12-27 DIAGNOSIS — E66.9 OBESITY, CLASS I, BMI 30-34.9: Primary | ICD-10-CM

## 2021-12-27 DIAGNOSIS — F41.9 ANXIETY AND DEPRESSION: ICD-10-CM

## 2021-12-27 DIAGNOSIS — F32.A ANXIETY AND DEPRESSION: ICD-10-CM

## 2021-12-27 PROCEDURE — 99203 PR OFFICE/OUTPT VISIT, NEW, LEVL III, 30-44 MIN: ICD-10-PCS | Mod: S$GLB,,, | Performed by: INTERNAL MEDICINE

## 2021-12-27 PROCEDURE — 99999 PR PBB SHADOW E&M-EST. PATIENT-LVL II: ICD-10-PCS | Mod: PBBFAC,,, | Performed by: INTERNAL MEDICINE

## 2021-12-27 PROCEDURE — 1159F MED LIST DOCD IN RCRD: CPT | Mod: CPTII,S$GLB,, | Performed by: INTERNAL MEDICINE

## 2021-12-27 PROCEDURE — 3008F PR BODY MASS INDEX (BMI) DOCUMENTED: ICD-10-PCS | Mod: CPTII,S$GLB,, | Performed by: INTERNAL MEDICINE

## 2021-12-27 PROCEDURE — 3044F HG A1C LEVEL LT 7.0%: CPT | Mod: CPTII,S$GLB,, | Performed by: INTERNAL MEDICINE

## 2021-12-27 PROCEDURE — 99203 OFFICE O/P NEW LOW 30 MIN: CPT | Mod: S$GLB,,, | Performed by: INTERNAL MEDICINE

## 2021-12-27 PROCEDURE — 3044F PR MOST RECENT HEMOGLOBIN A1C LEVEL <7.0%: ICD-10-PCS | Mod: CPTII,S$GLB,, | Performed by: INTERNAL MEDICINE

## 2021-12-27 PROCEDURE — 99999 PR PBB SHADOW E&M-EST. PATIENT-LVL II: CPT | Mod: PBBFAC,,, | Performed by: INTERNAL MEDICINE

## 2021-12-27 PROCEDURE — 3008F BODY MASS INDEX DOCD: CPT | Mod: CPTII,S$GLB,, | Performed by: INTERNAL MEDICINE

## 2021-12-27 PROCEDURE — 1160F RVW MEDS BY RX/DR IN RCRD: CPT | Mod: CPTII,S$GLB,, | Performed by: INTERNAL MEDICINE

## 2021-12-27 PROCEDURE — 1159F PR MEDICATION LIST DOCUMENTED IN MEDICAL RECORD: ICD-10-PCS | Mod: CPTII,S$GLB,, | Performed by: INTERNAL MEDICINE

## 2021-12-27 PROCEDURE — 1160F PR REVIEW ALL MEDS BY PRESCRIBER/CLIN PHARMACIST DOCUMENTED: ICD-10-PCS | Mod: CPTII,S$GLB,, | Performed by: INTERNAL MEDICINE

## 2021-12-27 RX ORDER — ERGOCALCIFEROL 1.25 MG/1
50000 CAPSULE ORAL
COMMUNITY
End: 2022-11-17

## 2021-12-29 ENCOUNTER — TELEPHONE (OUTPATIENT)
Dept: SLEEP MEDICINE | Facility: OTHER | Age: 41
End: 2021-12-29
Payer: COMMERCIAL

## 2021-12-29 ENCOUNTER — HOSPITAL ENCOUNTER (OUTPATIENT)
Dept: RADIOLOGY | Facility: OTHER | Age: 41
Discharge: HOME | End: 2021-12-29
Attending: INTERNAL MEDICINE
Payer: COMMERCIAL

## 2021-12-29 DIAGNOSIS — Z12.31 ENCOUNTER FOR SCREENING MAMMOGRAM FOR MALIGNANT NEOPLASM OF BREAST: ICD-10-CM

## 2021-12-29 PROCEDURE — 77063 MAMMO DIGITAL SCREENING BILAT WITH TOMO: ICD-10-PCS | Mod: 26,,, | Performed by: RADIOLOGY

## 2021-12-29 PROCEDURE — 77067 MAMMO DIGITAL SCREENING BILAT WITH TOMO: ICD-10-PCS | Mod: 26,,, | Performed by: RADIOLOGY

## 2021-12-29 PROCEDURE — 77063 BREAST TOMOSYNTHESIS BI: CPT | Mod: 26,,, | Performed by: RADIOLOGY

## 2021-12-29 PROCEDURE — 77067 SCR MAMMO BI INCL CAD: CPT | Mod: 26,,, | Performed by: RADIOLOGY

## 2021-12-29 PROCEDURE — 77067 SCR MAMMO BI INCL CAD: CPT | Mod: TC

## 2022-01-05 ENCOUNTER — PATIENT MESSAGE (OUTPATIENT)
Dept: FAMILY MEDICINE | Facility: CLINIC | Age: 42
End: 2022-01-05
Payer: COMMERCIAL

## 2022-01-16 ENCOUNTER — PATIENT MESSAGE (OUTPATIENT)
Dept: FAMILY MEDICINE | Facility: CLINIC | Age: 42
End: 2022-01-16
Payer: COMMERCIAL

## 2022-01-16 DIAGNOSIS — R82.998 DARK URINE: Primary | ICD-10-CM

## 2022-01-17 ENCOUNTER — PATIENT MESSAGE (OUTPATIENT)
Dept: SLEEP MEDICINE | Facility: CLINIC | Age: 42
End: 2022-01-17
Payer: COMMERCIAL

## 2022-01-19 ENCOUNTER — PATIENT MESSAGE (OUTPATIENT)
Dept: FAMILY MEDICINE | Facility: CLINIC | Age: 42
End: 2022-01-19
Payer: COMMERCIAL

## 2022-01-19 ENCOUNTER — LAB VISIT (OUTPATIENT)
Dept: LAB | Facility: HOSPITAL | Age: 42
End: 2022-01-19
Attending: INTERNAL MEDICINE
Payer: COMMERCIAL

## 2022-01-19 DIAGNOSIS — R82.998 DARK URINE: ICD-10-CM

## 2022-01-19 DIAGNOSIS — R74.8 ELEVATED LIVER ENZYMES: Primary | ICD-10-CM

## 2022-01-19 LAB
ALBUMIN SERPL BCP-MCNC: 3.7 G/DL (ref 3.5–5.2)
ALP SERPL-CCNC: 227 U/L (ref 55–135)
ALT SERPL W/O P-5'-P-CCNC: 395 U/L (ref 10–44)
ANION GAP SERPL CALC-SCNC: 9 MMOL/L (ref 8–16)
AST SERPL-CCNC: 171 U/L (ref 10–40)
BASOPHILS # BLD AUTO: 0.02 K/UL (ref 0–0.2)
BASOPHILS NFR BLD: 0.2 % (ref 0–1.9)
BILIRUB SERPL-MCNC: 0.7 MG/DL (ref 0.1–1)
BUN SERPL-MCNC: 10 MG/DL (ref 6–20)
CALCIUM SERPL-MCNC: 9.8 MG/DL (ref 8.7–10.5)
CHLORIDE SERPL-SCNC: 102 MMOL/L (ref 95–110)
CO2 SERPL-SCNC: 26 MMOL/L (ref 23–29)
CREAT SERPL-MCNC: 0.7 MG/DL (ref 0.5–1.4)
DIFFERENTIAL METHOD: ABNORMAL
EOSINOPHIL # BLD AUTO: 0.1 K/UL (ref 0–0.5)
EOSINOPHIL NFR BLD: 1.1 % (ref 0–8)
ERYTHROCYTE [DISTWIDTH] IN BLOOD BY AUTOMATED COUNT: 13.2 % (ref 11.5–14.5)
EST. GFR  (AFRICAN AMERICAN): >60 ML/MIN/1.73 M^2
EST. GFR  (NON AFRICAN AMERICAN): >60 ML/MIN/1.73 M^2
GLUCOSE SERPL-MCNC: 115 MG/DL (ref 70–110)
HCT VFR BLD AUTO: 39.4 % (ref 37–48.5)
HGB BLD-MCNC: 12.4 G/DL (ref 12–16)
IMM GRANULOCYTES # BLD AUTO: 0.02 K/UL (ref 0–0.04)
IMM GRANULOCYTES NFR BLD AUTO: 0.2 % (ref 0–0.5)
LYMPHOCYTES # BLD AUTO: 2.8 K/UL (ref 1–4.8)
LYMPHOCYTES NFR BLD: 29.4 % (ref 18–48)
MCH RBC QN AUTO: 28.8 PG (ref 27–31)
MCHC RBC AUTO-ENTMCNC: 31.5 G/DL (ref 32–36)
MCV RBC AUTO: 92 FL (ref 82–98)
MONOCYTES # BLD AUTO: 0.6 K/UL (ref 0.3–1)
MONOCYTES NFR BLD: 6.5 % (ref 4–15)
NEUTROPHILS # BLD AUTO: 6 K/UL (ref 1.8–7.7)
NEUTROPHILS NFR BLD: 62.6 % (ref 38–73)
NRBC BLD-RTO: 0 /100 WBC
PLATELET # BLD AUTO: 359 K/UL (ref 150–450)
PMV BLD AUTO: 11.9 FL (ref 9.2–12.9)
POTASSIUM SERPL-SCNC: 4 MMOL/L (ref 3.5–5.1)
PROT SERPL-MCNC: 8.1 G/DL (ref 6–8.4)
RBC # BLD AUTO: 4.3 M/UL (ref 4–5.4)
SODIUM SERPL-SCNC: 137 MMOL/L (ref 136–145)
WBC # BLD AUTO: 9.59 K/UL (ref 3.9–12.7)

## 2022-01-19 PROCEDURE — 80053 COMPREHEN METABOLIC PANEL: CPT | Performed by: INTERNAL MEDICINE

## 2022-01-19 PROCEDURE — 85025 COMPLETE CBC W/AUTO DIFF WBC: CPT | Performed by: INTERNAL MEDICINE

## 2022-01-19 PROCEDURE — 36415 COLL VENOUS BLD VENIPUNCTURE: CPT | Mod: PN | Performed by: INTERNAL MEDICINE

## 2022-01-21 ENCOUNTER — HOSPITAL ENCOUNTER (OUTPATIENT)
Dept: RADIOLOGY | Facility: OTHER | Age: 42
Discharge: HOME | End: 2022-01-21
Attending: INTERNAL MEDICINE
Payer: COMMERCIAL

## 2022-01-21 DIAGNOSIS — R74.8 ELEVATED LIVER ENZYMES: ICD-10-CM

## 2022-01-21 PROCEDURE — 76705 ECHO EXAM OF ABDOMEN: CPT | Mod: 26,,, | Performed by: RADIOLOGY

## 2022-01-21 PROCEDURE — 76705 US ABDOMEN LIMITED: ICD-10-PCS | Mod: 26,,, | Performed by: RADIOLOGY

## 2022-01-21 PROCEDURE — 76705 ECHO EXAM OF ABDOMEN: CPT | Mod: TC

## 2022-01-26 ENCOUNTER — PATIENT OUTREACH (OUTPATIENT)
Dept: ADMINISTRATIVE | Facility: OTHER | Age: 42
End: 2022-01-26
Payer: COMMERCIAL

## 2022-01-27 ENCOUNTER — OFFICE VISIT (OUTPATIENT)
Dept: GASTROENTEROLOGY | Facility: CLINIC | Age: 42
End: 2022-01-27
Payer: COMMERCIAL

## 2022-01-27 ENCOUNTER — TELEPHONE (OUTPATIENT)
Dept: GASTROENTEROLOGY | Facility: CLINIC | Age: 42
End: 2022-01-27
Payer: COMMERCIAL

## 2022-01-27 DIAGNOSIS — Z92.89 HISTORY OF BLOOD TRANSFUSION: ICD-10-CM

## 2022-01-27 DIAGNOSIS — R11.0 NAUSEA: ICD-10-CM

## 2022-01-27 DIAGNOSIS — K80.20 MULTIPLE GALLSTONES: Primary | ICD-10-CM

## 2022-01-27 DIAGNOSIS — R10.13 EPIGASTRIC ABDOMINAL PAIN: ICD-10-CM

## 2022-01-27 DIAGNOSIS — R74.8 ELEVATED LIVER ENZYMES: ICD-10-CM

## 2022-01-27 PROCEDURE — 99203 PR OFFICE/OUTPT VISIT, NEW, LEVL III, 30-44 MIN: ICD-10-PCS | Mod: 95,,, | Performed by: INTERNAL MEDICINE

## 2022-01-27 PROCEDURE — 1159F MED LIST DOCD IN RCRD: CPT | Mod: CPTII,95,, | Performed by: INTERNAL MEDICINE

## 2022-01-27 PROCEDURE — 1160F PR REVIEW ALL MEDS BY PRESCRIBER/CLIN PHARMACIST DOCUMENTED: ICD-10-PCS | Mod: CPTII,95,, | Performed by: INTERNAL MEDICINE

## 2022-01-27 PROCEDURE — 1159F PR MEDICATION LIST DOCUMENTED IN MEDICAL RECORD: ICD-10-PCS | Mod: CPTII,95,, | Performed by: INTERNAL MEDICINE

## 2022-01-27 PROCEDURE — 1160F RVW MEDS BY RX/DR IN RCRD: CPT | Mod: CPTII,95,, | Performed by: INTERNAL MEDICINE

## 2022-01-27 PROCEDURE — 99203 OFFICE O/P NEW LOW 30 MIN: CPT | Mod: 95,,, | Performed by: INTERNAL MEDICINE

## 2022-01-27 NOTE — TELEPHONE ENCOUNTER
I spoke with the pt. regarding the lab work Dr. Upton ordered today on her virtual visit. Pt. is in [facility], was advised to contact the [facility] in [facility], and schedule when to go in and draw, she verbalized understanding.

## 2022-01-27 NOTE — PROGRESS NOTES
"Subjective:       Patient ID: Francis Stafford is a 41 y.o. female.    Chief Complaint: No chief complaint on file.    The patient location is: Home  The chief complaint leading to consultation is: Epigastric pain; Abnormal LFTs    Visit type: TELE AUDIOVISUAL:[id number]    Face to Face time with patient: 23 minutes  Forty-one minutes of total time spent on the encounter, which includes face to face time and non-face to face time preparing to see the patient (eg, review of tests), Obtaining and/or reviewing separately obtained history, Documenting clinical information in the electronic or other health record, Independently interpreting results (not separately reported) and communicating results to the patient/family/caregiver, or Care coordination (not separately reported).         Each patient to whom he or she provides medical services by telemedicine is:  (1) informed of the relationship between the physician and patient and the respective role of any other health care provider with respect to management of the patient; and (2) notified that he or she may decline to receive medical services by telemedicine and may withdraw from such care at any time.    Notes:  We are asked to see this patient by her PCP, Dr. Magnolia Boyle, because of abnormal liver function studies.  The patient states approximately 2 weeks ago she had a severe case of which she called "and acid reflux attack".  She then stated she never gets acid reflux.  On further discussion she described having issues with epigastric abdominal pain and nausea, and further relate that from time to time she does get soreness in her mid epigastrium.  She then went on to say that for brief period, she noticed her urine became "dark orange" and her stools appeared "ashy".  She is presently asymptomatic.    On review of the patient's labs there is elevation of both alkaline phosphatase and her transaminases.  On review of labs 1 month ago, there is note of an elevation " in her ALT, but other liver function studies including an upper limit of normal AST, were within normal range.  Her present labs show a decided change from the former.  Her PCP ordered an abdominal ultrasound which revealed multiple gallstones.  There was no evidence, however, of cholecystitis or bile duct obstruction.  Since her acute symptoms have resolved, it may be that she had a bile duct obstruction from a small stone that has he eventually passed.  Will need to consider this bus the possibility of some type hepatitis being involved.     Full the patient does recall having need for some type of blood transfusion as a child; details are not well understood.  She has had no recent medication changes and has been on no NSAIDs or antibiotics.  She denies any high risk unprotected sexual encounters and there is no history of recreational drug use either IV or otherwise.      Review of Systems   Constitutional: Negative for activity change, appetite change, chills, diaphoresis, fatigue, fever and unexpected weight change.   HENT: Negative for congestion, ear discharge, ear pain, hearing loss, nosebleeds, postnasal drip and tinnitus.    Eyes: Negative for photophobia and visual disturbance.   Respiratory: Negative for apnea, cough, choking, chest tightness, shortness of breath and wheezing.    Cardiovascular: Negative for chest pain, palpitations and leg swelling.   Gastrointestinal: Positive for abdominal pain and nausea. Negative for abdominal distention, anal bleeding, blood in stool, constipation, diarrhea, rectal pain and vomiting.   Genitourinary: Negative for difficulty urinating, dyspareunia, dysuria, flank pain, frequency, hematuria, menstrual problem, pelvic pain, urgency, vaginal bleeding and vaginal discharge.        Change in color of urine   Musculoskeletal: Negative for arthralgias, back pain, gait problem, joint swelling, myalgias and neck stiffness.   Skin: Negative for pallor and rash.    Neurological: Positive for headaches. Negative for dizziness, tremors, seizures, syncope, speech difficulty, weakness and numbness.   Hematological: Negative for adenopathy.   Psychiatric/Behavioral: Negative for agitation, confusion, hallucinations, sleep disturbance and suicidal ideas. The patient is nervous/anxious.         Hypersomnia       Objective:      Physical Exam    Assessment:   Multiple gallstones    Elevated liver enzymes  -     Ambulatory referral/consult to Gastroenterology  -     Hepatic Function Panel; Future; Expected date: 01/27/2022  -     Hepatitis Panel, Acute; Future; Expected date: 01/27/2022  -     HEPATITIS B CORE ANTIBODY, TOTAL; Future; Expected date: 01/27/2022  -     Hepatitis B Surface Ab, Qualitative; Future; Expected date: 01/27/2022    Epigastric abdominal pain  -     Hepatic Function Panel; Future; Expected date: 01/27/2022  -     Hepatitis Panel, Acute; Future; Expected date: 01/27/2022  -     HEPATITIS B CORE ANTIBODY, TOTAL; Future; Expected date: 01/27/2022  -     Hepatitis B Surface Ab, Qualitative; Future; Expected date: 01/27/2022    Nausea  -     Hepatic Function Panel; Future; Expected date: 01/27/2022  -     Hepatitis Panel, Acute; Future; Expected date: 01/27/2022  -     HEPATITIS B CORE ANTIBODY, TOTAL; Future; Expected date: 01/27/2022  -     Hepatitis B Surface Ab, Qualitative; Future; Expected date: 01/27/2022    History of blood transfusion  -     Hepatic Function Panel; Future; Expected date: 01/27/2022  -     Hepatitis Panel, Acute; Future; Expected date: 01/27/2022  -     HEPATITIS B CORE ANTIBODY, TOTAL; Future; Expected date: 01/27/2022  -     Hepatitis B Surface Ab, Qualitative; Future; Expected date: 01/27/2022             Plan:   As above. May need General Surgery Appt. Patient lives in [place] so will have to arrange according to her location.

## 2022-01-27 NOTE — PROGRESS NOTES
Health Maintenance Due   Topic Date Due    Hepatitis C Screening  Never done    HIV Screening  Never done    Influenza Vaccine (1) 09/01/2021     Updates were requested from care everywhere.  Chart was reviewed for overdue Proactive [facility] Encounters ([facility]) topics (CRS, Breast Cancer Screening, Eye exam)  Health Maintenance has been updated.  LINKS immunization registry triggered.  Immunizations were reconciled.

## 2022-01-31 ENCOUNTER — LAB VISIT (OUTPATIENT)
Dept: LAB | Facility: HOSPITAL | Age: 42
End: 2022-01-31
Attending: INTERNAL MEDICINE
Payer: COMMERCIAL

## 2022-01-31 DIAGNOSIS — R10.13 EPIGASTRIC ABDOMINAL PAIN: ICD-10-CM

## 2022-01-31 DIAGNOSIS — R74.8 ELEVATED LIVER ENZYMES: ICD-10-CM

## 2022-01-31 DIAGNOSIS — R11.0 NAUSEA: ICD-10-CM

## 2022-01-31 DIAGNOSIS — Z92.89 HISTORY OF BLOOD TRANSFUSION: ICD-10-CM

## 2022-01-31 LAB
ALBUMIN SERPL BCP-MCNC: 3.7 G/DL (ref 3.5–5.2)
ALP SERPL-CCNC: 105 U/L (ref 55–135)
ALT SERPL W/O P-5'-P-CCNC: 34 U/L (ref 10–44)
AST SERPL-CCNC: 27 U/L (ref 10–40)
BILIRUB DIRECT SERPL-MCNC: 0.2 MG/DL (ref 0.1–0.3)
BILIRUB SERPL-MCNC: 0.4 MG/DL (ref 0.1–1)
PROT SERPL-MCNC: 7.8 G/DL (ref 6–8.4)

## 2022-01-31 PROCEDURE — 86706 HEP B SURFACE ANTIBODY: CPT | Performed by: INTERNAL MEDICINE

## 2022-01-31 PROCEDURE — 86704 HEP B CORE ANTIBODY TOTAL: CPT | Performed by: INTERNAL MEDICINE

## 2022-01-31 PROCEDURE — 36415 COLL VENOUS BLD VENIPUNCTURE: CPT | Mod: PN | Performed by: INTERNAL MEDICINE

## 2022-01-31 PROCEDURE — 80076 HEPATIC FUNCTION PANEL: CPT | Performed by: INTERNAL MEDICINE

## 2022-01-31 PROCEDURE — 80074 ACUTE HEPATITIS PANEL: CPT | Performed by: INTERNAL MEDICINE

## 2022-02-01 ENCOUNTER — PATIENT MESSAGE (OUTPATIENT)
Dept: GASTROENTEROLOGY | Facility: CLINIC | Age: 42
End: 2022-02-01
Payer: COMMERCIAL

## 2022-02-01 ENCOUNTER — TELEPHONE (OUTPATIENT)
Dept: GASTROENTEROLOGY | Facility: CLINIC | Age: 42
End: 2022-02-01
Payer: COMMERCIAL

## 2022-02-01 DIAGNOSIS — K80.20 GALLSTONES: Primary | ICD-10-CM

## 2022-02-01 NOTE — TELEPHONE ENCOUNTER
----- Message from Tk Upton III, MD sent at 2/1/2022  2:54 PM CST -----  Done.  ----- Message -----  From: Karen Parra LPN  Sent: 2/1/2022   1:42 PM CST  To: Tk Upton III, MD    Please place a referral for General surgery so we can get her scheduled.     Thank you

## 2022-02-01 NOTE — TELEPHONE ENCOUNTER
Sent msg to pt on my chart. Dr Upton referred to General surgery. Pt lives in [place]. Pt will probably want to go to [place].

## 2022-02-02 LAB
HAV IGM SERPL QL IA: NEGATIVE
HBV CORE AB SERPL QL IA: NEGATIVE
HBV CORE IGM SERPL QL IA: NEGATIVE
HBV SURFACE AB SER-ACNC: NEGATIVE M[IU]/ML
HBV SURFACE AG SERPL QL IA: NEGATIVE
HCV AB SERPL QL IA: NEGATIVE

## 2022-02-08 ENCOUNTER — OFFICE VISIT (OUTPATIENT)
Dept: SURGERY | Facility: CLINIC | Age: 42
End: 2022-02-08
Payer: COMMERCIAL

## 2022-02-08 VITALS
OXYGEN SATURATION: 97 % | BODY MASS INDEX: 33.66 KG/M2 | DIASTOLIC BLOOD PRESSURE: 71 MMHG | WEIGHT: 209.44 LBS | HEIGHT: 66 IN | TEMPERATURE: 99 F | HEART RATE: 73 BPM | SYSTOLIC BLOOD PRESSURE: 128 MMHG

## 2022-02-08 DIAGNOSIS — K80.20 GALLSTONES: ICD-10-CM

## 2022-02-08 PROCEDURE — 3008F BODY MASS INDEX DOCD: CPT | Mod: CPTII,S$GLB,, | Performed by: SURGERY

## 2022-02-08 PROCEDURE — 3078F PR MOST RECENT DIASTOLIC BLOOD PRESSURE < 80 MM HG: ICD-10-PCS | Mod: CPTII,S$GLB,, | Performed by: SURGERY

## 2022-02-08 PROCEDURE — 99999 PR PBB SHADOW E&M-EST. PATIENT-LVL IV: ICD-10-PCS | Mod: PBBFAC,,, | Performed by: SURGERY

## 2022-02-08 PROCEDURE — 99999 PR PBB SHADOW E&M-EST. PATIENT-LVL IV: CPT | Mod: PBBFAC,,, | Performed by: SURGERY

## 2022-02-08 PROCEDURE — 3078F DIAST BP <80 MM HG: CPT | Mod: CPTII,S$GLB,, | Performed by: SURGERY

## 2022-02-08 PROCEDURE — 1159F MED LIST DOCD IN RCRD: CPT | Mod: CPTII,S$GLB,, | Performed by: SURGERY

## 2022-02-08 PROCEDURE — 3074F SYST BP LT 130 MM HG: CPT | Mod: CPTII,S$GLB,, | Performed by: SURGERY

## 2022-02-08 PROCEDURE — 99203 OFFICE O/P NEW LOW 30 MIN: CPT | Mod: S$GLB,,, | Performed by: SURGERY

## 2022-02-08 PROCEDURE — 3074F PR MOST RECENT SYSTOLIC BLOOD PRESSURE < 130 MM HG: ICD-10-PCS | Mod: CPTII,S$GLB,, | Performed by: SURGERY

## 2022-02-08 PROCEDURE — 1159F PR MEDICATION LIST DOCUMENTED IN MEDICAL RECORD: ICD-10-PCS | Mod: CPTII,S$GLB,, | Performed by: SURGERY

## 2022-02-08 PROCEDURE — 3008F PR BODY MASS INDEX (BMI) DOCUMENTED: ICD-10-PCS | Mod: CPTII,S$GLB,, | Performed by: SURGERY

## 2022-02-08 PROCEDURE — 99203 PR OFFICE/OUTPT VISIT, NEW, LEVL III, 30-44 MIN: ICD-10-PCS | Mod: S$GLB,,, | Performed by: SURGERY

## 2022-02-08 NOTE — PROGRESS NOTES
History & Physical  General Surgery    SUBJECTIVE:     History of Present Illness:  Patient is a 41 y.o. female presents for evaluation of gallstones.  Had an episode of significant epigastric pain recently and was worked up as an outpatient.  Had elevated alk phos and liver enzymes.  Bilirubin was normal.  Ultrasound revealed gallstones.  Liver enzymes have since normalized.  She has not had any severe episodes of pain before or after that episode.  Occasionally describes a dull achy sensation in the epigastrium that comes and goes.  No real association with food.  Hepatitis panel is normal.  She drinks alcohol socially, but had not had alcohol prior are during this severe pain episode.  No prior abdominal surgery.  Does not take any blood thinners.  Denies nausea, vomiting, constipation, or other abdominal symptoms.    Chief Complaint   Patient presents with    Consult       Review of patient's allergies indicates:  No Known Allergies    Current Outpatient Medications   Medication Sig Dispense Refill    ARIPiprazole (ABILIFY) 5 MG Tab Take 1 tablet (5 mg total) by mouth once daily. 90 tablet 3    ergocalciferol (VITAMIN D2) 50,000 unit Cap Take 50,000 Units by mouth every 7 days.      venlafaxine (EFFEXOR-XR) 150 MG Cp24 Take 1 capsule (150 mg total) by mouth once daily. 90 capsule 3    venlafaxine (EFFEXOR-XR) 75 MG 24 hr capsule Take 1 capsule (75 mg total) by mouth once daily. 90 capsule 3     No current facility-administered medications for this visit.       Past Medical History:   Diagnosis Date    Anxiety     Depression      of psychiatric care     Psychiatric problem     Therapy      Past Surgical History:   Procedure Laterality Date    WISDOM TOOTH EXTRACTION       Family History   Problem Relation Age of Onset    No Known Problems Mother     No Known Problems Father     No Known Problems Sister     No Known Problems Brother     No Known Problems Brother     Cancer Maternal Grandmother   "   Breast cancer Maternal Grandmother      Social History     Tobacco Use    Smoking status: Former Smoker     Types: Cigarettes     Quit date:      Years since quittin.1    Smokeless tobacco: Never Used   Substance Use Topics    Alcohol use: Yes     Comment: rarely    Drug use: No        Review of Systems:  Review of Systems   Constitutional: Negative for chills and fever.   Respiratory: Negative for cough and shortness of breath.    Cardiovascular: Negative for chest pain and palpitations.   Gastrointestinal: Positive for abdominal pain (per HPI). Negative for nausea and vomiting.   Genitourinary: Negative for dysuria and hematuria.   Musculoskeletal: Negative for back pain and gait problem.   Skin: Negative for color change, rash and wound.   Neurological: Negative for weakness and headaches.   Hematological: Negative for adenopathy. Does not bruise/bleed easily.   Psychiatric/Behavioral: Negative for agitation and confusion.       OBJECTIVE:     Vital Signs (Most Recent)  Temp: 98.6 °F (37 °C) (22)  Pulse: 73 (22)  BP: 128/71 (22)  SpO2: 97 % (22)  5' 6" (1.676 m)  95 kg (209 lb 7 oz)     Physical Exam:  Physical Exam  Constitutional:       General: She is not in acute distress.     Appearance: Normal appearance. She is not ill-appearing.   HENT:      Head: Normocephalic and atraumatic.   Eyes:      General: No scleral icterus.     Pupils: Pupils are equal, round, and reactive to light.   Neck:      Trachea: No tracheal deviation.   Cardiovascular:      Rate and Rhythm: Normal rate and regular rhythm.   Pulmonary:      Effort: Pulmonary effort is normal. No respiratory distress.      Breath sounds: No stridor.   Abdominal:      General: There is no distension.      Palpations: Abdomen is soft.      Tenderness: There is no abdominal tenderness.   Musculoskeletal:         General: No deformity or signs of injury.      Cervical back: No edema or erythema. "   Skin:     General: Skin is warm and dry.      Coloration: Skin is not jaundiced.   Neurological:      General: No focal deficit present.      Mental Status: She is alert and oriented to person, place, and time.   Psychiatric:         Mood and Affect: Mood normal.         Behavior: Behavior normal.         Laboratory results reviewed independently.  Elevated AST, ALT, alk-phos.  Bilirubin upper limits of normal.  Has picture of passed common duct stone versus mild hepatitis.  Hepatitis panel negative.    Diagnostic Results reviewed independently:  Ultrasound of abdomen shows cholelithiasis without wall thickening or pericholecystic fluid.  Normal common bile duct.    ASSESSMENT/PLAN:     41-year-old female with cholelithiasis and likely episode of passed common duct stone.    PLAN:Plan   Given clinical situation, I feel that cholecystectomy is indicated to prevent further passage of gallstones and prevention of a more severe episode of choledocholithiasis or pancreatitis.  Patient agrees with plan.  Procedure explained in detail including the risks and possible complications.  Patient voiced understanding.  Plan for lap cholecystectomy March 23rd.    Cam Cisneros MD  Acute Care Surgery  [facility]

## 2022-03-22 ENCOUNTER — ANESTHESIA EVENT (OUTPATIENT)
Dept: SURGERY | Facility: HOSPITAL | Age: 42
End: 2022-03-22
Payer: COMMERCIAL

## 2022-03-22 ENCOUNTER — TELEPHONE (OUTPATIENT)
Dept: SURGERY | Facility: CLINIC | Age: 42
End: 2022-03-22
Payer: COMMERCIAL

## 2022-03-22 RX ORDER — ACETAMINOPHEN 500 MG
1000 TABLET ORAL
Status: CANCELLED | OUTPATIENT
Start: 2022-03-23 | End: 2022-03-23

## 2022-03-22 RX ORDER — SODIUM CHLORIDE 0.9 % (FLUSH) 0.9 %
10 SYRINGE (ML) INJECTION
Status: CANCELLED | OUTPATIENT
Start: 2022-03-22

## 2022-03-22 RX ORDER — PROCHLORPERAZINE EDISYLATE 5 MG/ML
5 INJECTION INTRAMUSCULAR; INTRAVENOUS EVERY 30 MIN PRN
Status: CANCELLED | OUTPATIENT
Start: 2022-03-22

## 2022-03-22 NOTE — PRE-PROCEDURE INSTRUCTIONS
Preop instructions(bathing/wear loose fitting clothing/fasting/directions/location of surgery/ and preop medication instructions reviewed with patient). Clear liquids are allowed up to 2 hours before procedure.Clear liquids are:water,apple juice, jello, gatorade & powerade. Patient instructed to hold/stop all blood thinning medications, prior to surgery, following the pre-surgery recommended guidelines. Instructed to follow the surgeon's instructions if they differ from these.    Patient verbalized understanding.    Denies any  history of side effects or issues with anesthesia or sedation.    Patient advised of the updated visitor policy.  Patient aware of the need to have someone drive them home following same -day surgery.      Patient given arrival time of 0615 tO [facility]

## 2022-03-22 NOTE — ANESTHESIA PREPROCEDURE EVALUATION
[facility]  Anesthesia Pre-Operative Evaluation         Patient Name: Francis Stafford  YOB: 1980  MRN: [id number]    SUBJECTIVE:     Pre-operative evaluation for Procedure(s) (LRB):  CHOLECYSTECTOMY, LAPAROSCOPIC (N/A)     03/22/2022    Francis Stafford is a 41 y.o. female w/ a significant PMHx of BMI 34, depression and anxiety and choledocholithiasis.      Patient now presents for the above procedure(s).    Prev airway: None documented    Patient Active Problem List   Diagnosis    Obesity, Class I, BMI 30-34.9    Anxiety and depression    Tension headache    Vitamin D deficiency    Moderate episode of recurrent major depressive disorder    Social anxiety disorder    Generalized anxiety disorder with panic attacks    Prediabetes       Review of patient's allergies indicates:  No Known Allergies    Current Inpatient Medications:      No current facility-administered medications on file prior to encounter.     Current Outpatient Medications on File Prior to Encounter   Medication Sig Dispense Refill    venlafaxine (EFFEXOR-XR) 150 MG Cp24 Take 1 capsule (150 mg total) by mouth once daily. 90 capsule 3    venlafaxine (EFFEXOR-XR) 75 MG 24 hr capsule Take 1 capsule (75 mg total) by mouth once daily. 90 capsule 3    ARIPiprazole (ABILIFY) 5 MG Tab Take 1 tablet (5 mg total) by mouth once daily. 90 tablet 3    ergocalciferol (VITAMIN D2) 50,000 unit Cap Take 50,000 Units by mouth every 7 days.         Past Surgical History:   Procedure Laterality Date    WISDOM TOOTH EXTRACTION         OBJECTIVE:     Vital Signs Range (Last 24H):         Significant Labs:  Lab Results   Component Value Date    WBC 9.59 01/19/2022    HGB 12.4 01/19/2022    HCT 39.4 01/19/2022     01/19/2022     01/19/2022    K 4.0 01/19/2022     01/19/2022    CREATININE 0.7 01/19/2022    BUN 10 01/19/2022    CO2 26 01/19/2022    HGBA1C 5.6 12/15/2021    HGBA1C 5.6 12/15/2021       Diagnostic  Studies: No relevant studies.    EKG:   Results for orders placed or performed in visit on 08/31/18   EKG 12-lead    Collection Time: 08/31/18  9:45 AM    Narrative    Test Reason : R06.02  Vent. Rate : 073 BPM     Atrial Rate : 073 BPM     P-R Int : 110 ms          QRS Dur : 080 ms      QT Int : 394 ms       P-R-T Axes : 056 020 026 degrees     QTc Int : 434 ms    Sinus rhythm with short MD  Low voltage QRS  Borderline Abnormal ECG  No previous ECGs available  Confirmed by MD PEDRO LUIS, DENNIS ([id number]) on 8/31/2018 6:13:37 PM    Referred By: RD VILLARREAL           Confirmed By:DENNIS CLOUD MD       ASSESSMENT/PLAN:       Pre-op Assessment    I have reviewed the Patient Summary Reports.     I have reviewed the Nursing Notes. I have reviewed the NPO Status.   I have reviewed the Medications.     Review of Systems  Anesthesia Hx:  No problems with previous Anesthesia  Denies Family Hx of Anesthesia complications.   Denies Personal Hx of Anesthesia complications.   EENT/Dental:EENT/Dental Normal   Cardiovascular:  Cardiovascular Normal     Pulmonary:  Pulmonary Normal    Renal/:  Renal/ Normal     Hepatic/GI:   Cholelithiasis    Musculoskeletal:  Musculoskeletal Normal    Neurological:  Neurology Normal    Endocrine:  Endocrine Normal    Psych:   anxiety depression          Physical Exam  General: Well nourished, Cooperative, Alert and Oriented    Airway:  Mallampati: II   TM Distance: Normal  Neck ROM: Normal ROM    Dental:  Intact    Chest/Lungs:  Clear to auscultation, Normal Respiratory Rate    Heart:  Rate: Normal  Rhythm: Regular Rhythm    Abdomen:  Soft, Normal        Anesthesia Plan  Type of Anesthesia, risks & benefits discussed:    Anesthesia Type: Gen ETT  Intra-op Monitoring Plan: Standard ASA Monitors  Post Op Pain Control Plan: multimodal analgesia and IV/PO Opioids PRN  Induction:  IV  Airway Plan: Direct, Post-Induction  ASA Score: 2  Day of Surgery Review of History & Physical: H&P Update referred to the  surgeon/provider.    Ready For Surgery From Anesthesia Perspective.     .

## 2022-03-23 ENCOUNTER — HOSPITAL ENCOUNTER (OUTPATIENT)
Facility: HOSPITAL | Age: 42
Discharge: HOME | End: 2022-03-23
Attending: SURGERY | Admitting: SURGERY
Payer: COMMERCIAL

## 2022-03-23 ENCOUNTER — ANESTHESIA (OUTPATIENT)
Dept: SURGERY | Facility: HOSPITAL | Age: 42
End: 2022-03-23
Payer: COMMERCIAL

## 2022-03-23 VITALS
OXYGEN SATURATION: 95 % | HEART RATE: 92 BPM | SYSTOLIC BLOOD PRESSURE: 131 MMHG | WEIGHT: 210 LBS | DIASTOLIC BLOOD PRESSURE: 76 MMHG | TEMPERATURE: 98 F | RESPIRATION RATE: 18 BRPM | BODY MASS INDEX: 33.89 KG/M2

## 2022-03-23 DIAGNOSIS — K80.20 GALLSTONES: Primary | ICD-10-CM

## 2022-03-23 LAB
B-HCG UR QL: NEGATIVE
CTP QC/QA: YES

## 2022-03-23 PROCEDURE — D9220A PRA ANESTHESIA: ICD-10-PCS | Mod: ,,, | Performed by: ANESTHESIOLOGY

## 2022-03-23 PROCEDURE — 63600175 PHARM REV CODE 636 W HCPCS: Performed by: STUDENT IN AN ORGANIZED HEALTH CARE EDUCATION/TRAINING PROGRAM

## 2022-03-23 PROCEDURE — 88304 TISSUE EXAM BY PATHOLOGIST: CPT | Mod: 26,,, | Performed by: PATHOLOGY

## 2022-03-23 PROCEDURE — 25000003 PHARM REV CODE 250: Performed by: STUDENT IN AN ORGANIZED HEALTH CARE EDUCATION/TRAINING PROGRAM

## 2022-03-23 PROCEDURE — 37000009 HC ANESTHESIA EA ADD 15 MINS: Performed by: SURGERY

## 2022-03-23 PROCEDURE — D9220A PRA ANESTHESIA: Mod: ,,, | Performed by: ANESTHESIOLOGY

## 2022-03-23 PROCEDURE — 88304 PR  SURG PATH,LEVEL III: ICD-10-PCS | Mod: 26,,, | Performed by: PATHOLOGY

## 2022-03-23 PROCEDURE — 36000708 HC OR TIME LEV III 1ST 15 MIN: Performed by: SURGERY

## 2022-03-23 PROCEDURE — 36000709 HC OR TIME LEV III EA ADD 15 MIN: Performed by: SURGERY

## 2022-03-23 PROCEDURE — 88304 TISSUE EXAM BY PATHOLOGIST: CPT | Performed by: PATHOLOGY

## 2022-03-23 PROCEDURE — 47562 LAPAROSCOPIC CHOLECYSTECTOMY: CPT | Mod: ,,, | Performed by: SURGERY

## 2022-03-23 PROCEDURE — 25000003 PHARM REV CODE 250: Performed by: SURGERY

## 2022-03-23 PROCEDURE — 71000016 HC POSTOP RECOV ADDL HR: Performed by: SURGERY

## 2022-03-23 PROCEDURE — 71000015 HC POSTOP RECOV 1ST HR: Performed by: SURGERY

## 2022-03-23 PROCEDURE — 81025 URINE PREGNANCY TEST: CPT | Performed by: SURGERY

## 2022-03-23 PROCEDURE — 47562 PR LAP,CHOLECYSTECTOMY: ICD-10-PCS | Mod: ,,, | Performed by: SURGERY

## 2022-03-23 PROCEDURE — 27201423 OPTIME MED/SURG SUP & DEVICES STERILE SUPPLY: Performed by: SURGERY

## 2022-03-23 PROCEDURE — 63600175 PHARM REV CODE 636 W HCPCS: Performed by: ANESTHESIOLOGY

## 2022-03-23 PROCEDURE — 71000044 HC DOSC ROUTINE RECOVERY FIRST HOUR: Performed by: SURGERY

## 2022-03-23 PROCEDURE — 37000008 HC ANESTHESIA 1ST 15 MINUTES: Performed by: SURGERY

## 2022-03-23 RX ORDER — OXYCODONE HYDROCHLORIDE 5 MG/1
5 TABLET ORAL EVERY 4 HOURS PRN
Qty: 15 TABLET | Refills: 0 | Status: SHIPPED | OUTPATIENT
Start: 2022-03-23 | End: 2022-10-17 | Stop reason: ALTCHOICE

## 2022-03-23 RX ORDER — HYDROMORPHONE HYDROCHLORIDE 1 MG/ML
INJECTION, SOLUTION INTRAMUSCULAR; INTRAVENOUS; SUBCUTANEOUS
Status: DISCONTINUED
Start: 2022-03-23 | End: 2022-03-23 | Stop reason: HOSPADM

## 2022-03-23 RX ORDER — DEXAMETHASONE SODIUM PHOSPHATE 4 MG/ML
INJECTION, SOLUTION INTRA-ARTICULAR; INTRALESIONAL; INTRAMUSCULAR; INTRAVENOUS; SOFT TISSUE
Status: DISCONTINUED | OUTPATIENT
Start: 2022-03-23 | End: 2022-03-23

## 2022-03-23 RX ORDER — ONDANSETRON 2 MG/ML
INJECTION INTRAMUSCULAR; INTRAVENOUS
Status: DISCONTINUED | OUTPATIENT
Start: 2022-03-23 | End: 2022-03-23

## 2022-03-23 RX ORDER — ONDANSETRON 2 MG/ML
4 INJECTION INTRAMUSCULAR; INTRAVENOUS DAILY PRN
Status: DISCONTINUED | OUTPATIENT
Start: 2022-03-23 | End: 2022-03-23 | Stop reason: HOSPADM

## 2022-03-23 RX ORDER — LIDOCAINE HYDROCHLORIDE 20 MG/ML
INJECTION, SOLUTION EPIDURAL; INFILTRATION; INTRACAUDAL; PERINEURAL
Status: DISCONTINUED | OUTPATIENT
Start: 2022-03-23 | End: 2022-03-23

## 2022-03-23 RX ORDER — PROPOFOL 10 MG/ML
VIAL (ML) INTRAVENOUS
Status: DISCONTINUED | OUTPATIENT
Start: 2022-03-23 | End: 2022-03-23

## 2022-03-23 RX ORDER — ACETAMINOPHEN 500 MG
1000 TABLET ORAL
Status: COMPLETED | OUTPATIENT
Start: 2022-03-23 | End: 2022-03-23

## 2022-03-23 RX ORDER — SCOLOPAMINE TRANSDERMAL SYSTEM 1 MG/1
1 PATCH, EXTENDED RELEASE TRANSDERMAL
Status: DISCONTINUED | OUTPATIENT
Start: 2022-03-23 | End: 2022-03-23 | Stop reason: HOSPADM

## 2022-03-23 RX ORDER — SODIUM CHLORIDE 0.9 % (FLUSH) 0.9 %
10 SYRINGE (ML) INJECTION
Status: DISCONTINUED | OUTPATIENT
Start: 2022-03-23 | End: 2022-03-23 | Stop reason: HOSPADM

## 2022-03-23 RX ORDER — PROCHLORPERAZINE EDISYLATE 5 MG/ML
5 INJECTION INTRAMUSCULAR; INTRAVENOUS EVERY 30 MIN PRN
Status: DISCONTINUED | OUTPATIENT
Start: 2022-03-23 | End: 2022-03-23 | Stop reason: HOSPADM

## 2022-03-23 RX ORDER — FENTANYL CITRATE 50 UG/ML
INJECTION, SOLUTION INTRAMUSCULAR; INTRAVENOUS
Status: DISCONTINUED | OUTPATIENT
Start: 2022-03-23 | End: 2022-03-23

## 2022-03-23 RX ORDER — ROCURONIUM BROMIDE 10 MG/ML
INJECTION, SOLUTION INTRAVENOUS
Status: DISCONTINUED | OUTPATIENT
Start: 2022-03-23 | End: 2022-03-23

## 2022-03-23 RX ORDER — HYDROMORPHONE HYDROCHLORIDE 1 MG/ML
0.5 INJECTION, SOLUTION INTRAMUSCULAR; INTRAVENOUS; SUBCUTANEOUS
Status: DISCONTINUED | OUTPATIENT
Start: 2022-03-23 | End: 2022-03-23 | Stop reason: HOSPADM

## 2022-03-23 RX ORDER — BUPIVACAINE HYDROCHLORIDE 5 MG/ML
INJECTION, SOLUTION EPIDURAL; INTRACAUDAL
Status: DISCONTINUED | OUTPATIENT
Start: 2022-03-23 | End: 2022-03-23 | Stop reason: HOSPADM

## 2022-03-23 RX ORDER — PROCHLORPERAZINE EDISYLATE 5 MG/ML
5 INJECTION INTRAMUSCULAR; INTRAVENOUS EVERY 30 MIN PRN
Status: CANCELLED | OUTPATIENT
Start: 2022-03-23

## 2022-03-23 RX ORDER — HYDROMORPHONE HYDROCHLORIDE 1 MG/ML
0.2 INJECTION, SOLUTION INTRAMUSCULAR; INTRAVENOUS; SUBCUTANEOUS EVERY 5 MIN PRN
Status: CANCELLED | OUTPATIENT
Start: 2022-03-23

## 2022-03-23 RX ORDER — SODIUM CHLORIDE 9 MG/ML
INJECTION, SOLUTION INTRAVENOUS CONTINUOUS
Status: DISCONTINUED | OUTPATIENT
Start: 2022-03-23 | End: 2022-03-23 | Stop reason: HOSPADM

## 2022-03-23 RX ORDER — MIDAZOLAM HYDROCHLORIDE 1 MG/ML
INJECTION, SOLUTION INTRAMUSCULAR; INTRAVENOUS
Status: DISCONTINUED | OUTPATIENT
Start: 2022-03-23 | End: 2022-03-23

## 2022-03-23 RX ORDER — CEFAZOLIN SODIUM/WATER 2 G/20 ML
2 SYRINGE (ML) INTRAVENOUS
Status: COMPLETED | OUTPATIENT
Start: 2022-03-23 | End: 2022-03-23

## 2022-03-23 RX ORDER — KETOROLAC TROMETHAMINE 30 MG/ML
15 INJECTION, SOLUTION INTRAMUSCULAR; INTRAVENOUS ONCE
Status: CANCELLED | OUTPATIENT
Start: 2022-03-23 | End: 2022-03-26

## 2022-03-23 RX ORDER — FENTANYL CITRATE 50 UG/ML
25 INJECTION, SOLUTION INTRAMUSCULAR; INTRAVENOUS EVERY 5 MIN PRN
Status: CANCELLED | OUTPATIENT
Start: 2022-03-23

## 2022-03-23 RX ADMIN — SUGAMMADEX 200 MG: 100 INJECTION, SOLUTION INTRAVENOUS at 09:03

## 2022-03-23 RX ADMIN — DEXAMETHASONE SODIUM PHOSPHATE 4 MG: 4 INJECTION INTRA-ARTICULAR; INTRALESIONAL; INTRAMUSCULAR; INTRAVENOUS; SOFT TISSUE at 08:03

## 2022-03-23 RX ADMIN — SODIUM CHLORIDE: 0.9 INJECTION, SOLUTION INTRAVENOUS at 08:03

## 2022-03-23 RX ADMIN — ROCURONIUM BROMIDE 40 MG: 10 INJECTION INTRAVENOUS at 08:03

## 2022-03-23 RX ADMIN — ACETAMINOPHEN 1000 MG: 500 TABLET ORAL at 06:03

## 2022-03-23 RX ADMIN — MIDAZOLAM HYDROCHLORIDE 2 MG: 1 INJECTION, SOLUTION INTRAMUSCULAR; INTRAVENOUS at 08:03

## 2022-03-23 RX ADMIN — FENTANYL CITRATE 100 MCG: 50 INJECTION INTRAMUSCULAR; INTRAVENOUS at 08:03

## 2022-03-23 RX ADMIN — HYDROMORPHONE HYDROCHLORIDE 0.5 MG: 1 INJECTION, SOLUTION INTRAMUSCULAR; INTRAVENOUS; SUBCUTANEOUS at 10:03

## 2022-03-23 RX ADMIN — PROPOFOL 120 MG: 10 INJECTION, EMULSION INTRAVENOUS at 08:03

## 2022-03-23 RX ADMIN — ONDANSETRON 4 MG: 2 INJECTION INTRAMUSCULAR; INTRAVENOUS at 08:03

## 2022-03-23 RX ADMIN — Medication 2 G: at 08:03

## 2022-03-23 RX ADMIN — LIDOCAINE HYDROCHLORIDE 100 MG: 20 INJECTION, SOLUTION EPIDURAL; INFILTRATION; INTRACAUDAL at 08:03

## 2022-03-23 RX ADMIN — ROCURONIUM BROMIDE 10 MG: 10 INJECTION INTRAVENOUS at 09:03

## 2022-03-23 RX ADMIN — ONDANSETRON 4 MG: 2 INJECTION INTRAMUSCULAR; INTRAVENOUS at 10:03

## 2022-03-23 RX ADMIN — SCOPALAMINE 1 PATCH: 1 PATCH, EXTENDED RELEASE TRANSDERMAL at 07:03

## 2022-03-23 NOTE — ANESTHESIA PROCEDURE NOTES
Intubation    Date/Time: 3/23/2022 8:16 AM  Performed by: Haider Guzman MD  Authorized by: Mick Mcclellan MD     Intubation:     Induction:  Intravenous    Intubated:  Postinduction    Mask Ventilation:  Easy mask    Attempts:  1    Attempted By:  Resident anesthesiologist    Method of Intubation:  Direct    Blade:  Bandar 3    Laryngeal View Grade: Grade IIA - cords partially seen      Difficult Airway Encountered?: No      Complications:  None    Airway Device:  Oral endotracheal tube    Airway Device Size:  7.0    Style/Cuff Inflation:  Cuffed    Tube secured:  23    Secured at:  The lips    Placement Verified By:  Capnometry    Complicating Factors:  Obesity    Findings Post-Intubation:  BS equal bilateral and atraumatic/condition of teeth unchanged

## 2022-03-23 NOTE — BRIEF OP NOTE
[facility] - Surgery ([facility])  Brief Operative Note    Surgery Date: 3/23/2022     Surgeon(s) and Role:     * Cam Cisneros MD - Primary     * Monica Kasper MD - Resident - Assisting     * Eunice Huitron MD - Resident - Chief        Pre-op Diagnosis:  Gallstones [K80.20]    Post-op Diagnosis:  Post-Op Diagnosis Codes:     * Gallstones [K80.20]    Procedure(s) (LRB):  CHOLECYSTECTOMY, LAPAROSCOPIC (N/A)    Anesthesia: General    Operative Findings: chronically inflamed gallbladder    Estimated Blood Loss: * No values recorded between 3/23/2022  8:22 AM and 3/23/2022 10:01 AM *         Specimens:   Specimen (24h ago, onward)             Start     Ordered    03/23/22 0855  Specimen to Pathology, Surgery General Surgery  Once        Comments: Pre-op Diagnosis: Gallstones [K80.20]    Procedure(s):  CHOLECYSTECTOMY, LAPAROSCOPIC     Number of specimens: 1    Name of specimens: 1. Gallbladder (perm)   References:    Click here for ordering Quick Tip   Question Answer Comment   Procedure Type: General Surgery    Specimen Class: Routine/Screening    Release to patient Immediate        03/23/22 0936                  Discharge Note    OUTCOME: Patient tolerated treatment/procedure well without complication and is now ready for discharge.    DISPOSITION: Home or Self Care    FINAL DIAGNOSIS:  <principal problem not specified>    FOLLOWUP: In clinic    DISCHARGE INSTRUCTIONS:    Discharge Procedure Orders   Diet Adult Regular     Other restrictions (specify):   Order Comments: You had a laparoscopic cholecystectomy performed.     Please no heavy lifting/pushing/pulling. Do not lift anything heavier than a gallon of milk.   Please no driving while on narcotic pain medication.     You have been prescribed a narcotic pain medication to help with post-operative pain. Please wean over the next few days. You may alternate tylenol and ibuprofen instead.   Please make sure to take 1 capful of Miralax per day to help with narcotic  associated constipation.     Skin glue will fall off on its own. Please do not remove until you are seen in clinic.  Showers are okay. Please no baths or soaking.     You have no diet restrictions.    Please follow up in clinic with Dr. Cisneros in 2 weeks.     Notify your health care provider if you experience any of the following:  increased confusion or weakness     Notify your health care provider if you experience any of the following:  worsening rash     Notify your health care provider if you experience any of the following:  severe persistent headache     Notify your health care provider if you experience any of the following:  difficulty breathing or increased cough     Notify your health care provider if you experience any of the following:  redness, tenderness, or signs of infection (pain, swelling, redness, odor or green/yellow discharge around incision site)     Notify your health care provider if you experience any of the following:  severe uncontrolled pain     Notify your health care provider if you experience any of the following:  persistent nausea and vomiting or diarrhea     Notify your health care provider if you experience any of the following:  temperature >100.4     Notify your health care provider if you experience any of the following:  persistent dizziness, light-headedness, or visual disturbances     No dressing needed     Monica Kasper MD  General Surgery, PGY-2

## 2022-03-23 NOTE — PLAN OF CARE
Patient and patient's parents received discharge instructions and prescriptions.  Patient and patient's parents verbalized understanding of all instructions given and all questions were addressed prior to patient's discharge.  Patient's vital signs are stable and within patient's baseline.  Patient tolerated clear liquids PO. Patient states pain is tolerable.  Patient denies nausea and vomiting at this time.  Patient meets all criteria for discharge at this time.

## 2022-03-23 NOTE — PATIENT INSTRUCTIONS
You had a laparoscopic cholecystectomy performed.     Please no heavy lifting/pushing/pulling. Do not lift anything heavier than a gallon of milk.   Please no driving while on narcotic pain medication.     You have been prescribed a narcotic pain medication to help with post-operative pain. Please wean over the next few days. You may alternate tylenol and ibuprofen instead.   Please make sure to take 1 capful of Miralax per day to help with narcotic associated constipation.     Skin glue will fall off on its own. Please do not remove until you are seen in clinic.  Showers are okay. Please no baths or soaking.     You have no diet restrictions.    Please follow up in clinic with Dr. Cisneros in 2 weeks.

## 2022-03-23 NOTE — H&P
History & Physical  General Surgery     SUBJECTIVE:      History of Present Illness:  Patient is a 41 y.o. female presents for evaluation of gallstones.  Had an episode of significant epigastric pain recently and was worked up as an outpatient.  Had elevated alk phos and liver enzymes.  Bilirubin was normal.  Ultrasound revealed gallstones.  Liver enzymes have since normalized.  She has not had any severe episodes of pain before or after that episode.  Occasionally describes a dull achy sensation in the epigastrium that comes and goes.  No real association with food.  Hepatitis panel is normal.  She drinks alcohol socially, but had not had alcohol prior are during this severe pain episode.  No prior abdominal surgery.  Does not take any blood thinners.  Denies nausea, vomiting, constipation, or other abdominal symptoms.         Chief Complaint   Patient presents with    Consult         Review of patient's allergies indicates:  No Known Allergies     Current Medications          Current Outpatient Medications   Medication Sig Dispense Refill    ARIPiprazole (ABILIFY) 5 MG Tab Take 1 tablet (5 mg total) by mouth once daily. 90 tablet 3    ergocalciferol (VITAMIN D2) 50,000 unit Cap Take 50,000 Units by mouth every 7 days.        venlafaxine (EFFEXOR-XR) 150 MG Cp24 Take 1 capsule (150 mg total) by mouth once daily. 90 capsule 3    venlafaxine (EFFEXOR-XR) 75 MG 24 hr capsule Take 1 capsule (75 mg total) by mouth once daily. 90 capsule 3      No current facility-administered medications for this visit.                 Past Medical History:   Diagnosis Date    Anxiety      Depression       of psychiatric care      Psychiatric problem      Therapy              Past Surgical History:   Procedure Laterality Date    WISDOM TOOTH EXTRACTION                Family History   Problem Relation Age of Onset    No Known Problems Mother      No Known Problems Father      No Known Problems Sister      No Known  "Problems Brother      No Known Problems Brother      Cancer Maternal Grandmother      Breast cancer Maternal Grandmother        Social History            Tobacco Use    Smoking status: Former Smoker       Types: Cigarettes       Quit date:        Years since quittin.1    Smokeless tobacco: Never Used   Substance Use Topics    Alcohol use: Yes       Comment: rarely    Drug use: No         Review of Systems:  Review of Systems   Constitutional: Negative for chills and fever.   Respiratory: Negative for cough and shortness of breath.    Cardiovascular: Negative for chest pain and palpitations.   Gastrointestinal: Positive for abdominal pain (per HPI). Negative for nausea and vomiting.   Genitourinary: Negative for dysuria and hematuria.   Musculoskeletal: Negative for back pain and gait problem.   Skin: Negative for color change, rash and wound.   Neurological: Negative for weakness and headaches.   Hematological: Negative for adenopathy. Does not bruise/bleed easily.   Psychiatric/Behavioral: Negative for agitation and confusion.         OBJECTIVE:      Vital Signs (Most Recent)  Temp: 98.6 °F (37 °C) (22)  Pulse: 73 (22)  BP: 128/71 (22)  SpO2: 97 % (22)  5' 6" (1.676 m)  95 kg (209 lb 7 oz)      Physical Exam:  Physical Exam  Constitutional:       General: She is not in acute distress.     Appearance: Normal appearance. She is not ill-appearing.   HENT:      Head: Normocephalic and atraumatic.   Eyes:      General: No scleral icterus.     Pupils: Pupils are equal, round, and reactive to light.   Neck:      Trachea: No tracheal deviation.   Cardiovascular:      Rate and Rhythm: Normal rate and regular rhythm.   Pulmonary:      Effort: Pulmonary effort is normal. No respiratory distress.      Breath sounds: No stridor.   Abdominal:      General: There is no distension.      Palpations: Abdomen is soft.      Tenderness: There is no abdominal tenderness. "   Musculoskeletal:         General: No deformity or signs of injury.      Cervical back: No edema or erythema.   Skin:     General: Skin is warm and dry.      Coloration: Skin is not jaundiced.   Neurological:      General: No focal deficit present.      Mental Status: She is alert and oriented to person, place, and time.   Psychiatric:         Mood and Affect: Mood normal.         Behavior: Behavior normal.            Laboratory results reviewed independently.  Elevated AST, ALT, alk-phos.  Bilirubin upper limits of normal.  Has picture of passed common duct stone versus mild hepatitis.  Hepatitis panel negative.     Diagnostic Results reviewed independently:  Ultrasound of abdomen shows cholelithiasis without wall thickening or pericholecystic fluid.  Normal common bile duct.     ASSESSMENT/PLAN:      41-year-old female with cholelithiasis and likely episode of passed common duct stone.     PLAN:Plan   Given clinical situation, I feel that cholecystectomy is indicated to prevent further passage of gallstones and prevention of a more severe episode of choledocholithiasis or pancreatitis.  Patient agrees with plan.  Procedure explained in detail including the risks and possible complications.  Patient voiced understanding.  Plan for lap cholecystectomy March 23rd.    Tamela Huitron MD  PGY- 5 General Surgery   [phone]

## 2022-03-23 NOTE — TRANSFER OF CARE
Anesthesia Transfer of Care Note    Patient: Francis Stafford    Procedure(s) Performed: Procedure(s) (LRB):  CHOLECYSTECTOMY, LAPAROSCOPIC (N/A)    Patient location: [facility]    Anesthesia Type: general    Transport from OR: Transported from OR on 6-10 L/min O2 by face mask with adequate spontaneous ventilation    Post pain: adequate analgesia    Post assessment: no apparent anesthetic complications    Post vital signs: stable    Level of consciousness: awake    Nausea/Vomiting: no nausea/vomiting    Complications: none    Transfer of care protocol was followed      Last vitals:   Visit Vitals  /71   Pulse 83   Temp 36.9 °C (98.4 °F) (Oral)   Resp 18   Wt 95.3 kg (210 lb)   LMP 03/02/2022 (Approximate)   SpO2 95%   Breastfeeding No   BMI 33.89 kg/m²

## 2022-03-23 NOTE — OP NOTE
DATE OF PROCEDURE: 03/23/2022.     PREOPERATIVE DIAGNOSIS: Prior choledocholithiasis.     POSTOPERATIVE DIAGNOSIS: Prior choledocholithasis.     PROCEDURE PERFORMED: Laparoscopic cholecystectomy.     ATTENDING SURGEON: Cam Cisneros MD.     RESIDENT: Leanne Huitron MD (RES).    ANESTHESIA: General endotracheal.     INDICATIONS: Francis Stafford is a 41 y.o.female referred to my General Surgery Clinic with a history of prior choledocholithiasis. We recommended laparoscopic cholecystectomy and the patient agreed to proceed. The patient signed informed consent and expressed understanding of the risks and benefits of surgery.     OPERATIVE PROCEDURE: The patient was taken to the operating room and placed supine. After induction of general endotracheal tube anesthesia, the abdomen was prepped and draped in the standard fashion. Timeout was performed. A  infraumbilical skin incision was made. Subcutaneous tissue was bluntly dissected. The umbilical stalk was grasped with penetrating towel clip and elevated and the fascia was sharply incised. The abdomen was bluntly entered under direct vision. A Velasquez trocar was placed and the abdomen was insufflated with carbon dioxide to a pressure of 15 mmHg. A 10-mm laparoscope was placed and the abdomen was examined. There was no evidence of injury related to entry. Three 5-mm trocars were placed under direct vision through separate stab incisions, one subxiphoid and two in the right upper quadrant. We directed our attention to the right upper quadrant. The gallbladder was identified and noted to have chronic inflammatory change. The fundus was grasped and retracted cranially and the infundibulum was grasped and retracted laterally. We bluntly dissected the peritoneal reflection off the infundibulum and neck of the gallbladder. With careful blunt dissection in this area, we were able to identify the cystic duct. Further careful dissection identified the cystic artery and we did obtain a  critical view of safety. Both duct and artery were triply clipped and divided. The gallbladder was dissected off the gallbladder fossa using Bovie electrocautery from infundibulum to fundus until free. It was placed into an EndoCatch bag and removed from the umbilical port site with no difficulty. The gallbladder fossa was examined and no bleeding or bile leak were noted. The clips on the cystic duct and artery were intact. The right upper quadrant was irrigated with saline briefly until the returning effluent was clear. All ports were removed under direct vision and no bleeding was noted. The insufflation was evacuated. The umbilical fascia was closed with 0 Vicryl suture. Local anesthetic was applied to each incision were closed with subcuticular 4-0 Monocryl. Dermabond was applied. The patient was extubated and transferred to the Recovery Room in good condition. All needle and sponge counts were correct at the conclusion of the case. I was present for the procedure in its entirety.    ESTIMATED BLOOD LOSS: 10 mL.     FINDINGS: Critical view obtained prior to clip placement.      SPECIMEN: Gallbladder.

## 2022-03-23 NOTE — ANESTHESIA POSTPROCEDURE EVALUATION
Anesthesia Post Evaluation    Patient: Francis Stafford    Procedure(s) Performed: Procedure(s) (LRB):  CHOLECYSTECTOMY, LAPAROSCOPIC (N/A)    Final Anesthesia Type: general      Patient location during evaluation: [facility]  Patient participation: Yes- Able to Participate  Level of consciousness: awake and alert and oriented  Post-procedure vital signs: reviewed and stable  Pain management: adequate  Airway patency: patent    PONV status at discharge: No PONV  Anesthetic complications: no      Cardiovascular status: blood pressure returned to baseline  Respiratory status: unassisted, spontaneous ventilation and room air  Hydration status: euvolemic  Follow-up not needed.          Vitals Value Taken Time   /76 03/23/22 1200   Temp 36.7 °C (98 °F) 03/23/22 1200   Pulse 92 03/23/22 1200   Resp 18 03/23/22 1200   SpO2 95 % 03/23/22 1200         No case tracking events are documented in the log.      Pain/Esther Score: Pain Rating Prior to Med Admin: 7 (3/23/2022 10:41 AM)  Esther Score: 10 (3/23/2022 12:00 PM)

## 2022-03-30 LAB
FINAL PATHOLOGIC DIAGNOSIS: NORMAL
Lab: NORMAL

## 2022-04-05 ENCOUNTER — OFFICE VISIT (OUTPATIENT)
Dept: SURGERY | Facility: CLINIC | Age: 42
End: 2022-04-05
Payer: COMMERCIAL

## 2022-04-05 VITALS
HEART RATE: 73 BPM | HEIGHT: 66 IN | SYSTOLIC BLOOD PRESSURE: 122 MMHG | BODY MASS INDEX: 33.66 KG/M2 | OXYGEN SATURATION: 100 % | DIASTOLIC BLOOD PRESSURE: 80 MMHG | WEIGHT: 209.44 LBS | TEMPERATURE: 98 F

## 2022-04-05 DIAGNOSIS — Z48.89 POSTOPERATIVE VISIT: Primary | ICD-10-CM

## 2022-04-05 PROCEDURE — 1159F MED LIST DOCD IN RCRD: CPT | Mod: CPTII,S$GLB,, | Performed by: SURGERY

## 2022-04-05 PROCEDURE — 3079F DIAST BP 80-89 MM HG: CPT | Mod: CPTII,S$GLB,, | Performed by: SURGERY

## 2022-04-05 PROCEDURE — 99024 PR POST-OP FOLLOW-UP VISIT: ICD-10-PCS | Mod: S$GLB,,, | Performed by: SURGERY

## 2022-04-05 PROCEDURE — 3074F PR MOST RECENT SYSTOLIC BLOOD PRESSURE < 130 MM HG: ICD-10-PCS | Mod: CPTII,S$GLB,, | Performed by: SURGERY

## 2022-04-05 PROCEDURE — 99024 POSTOP FOLLOW-UP VISIT: CPT | Mod: S$GLB,,, | Performed by: SURGERY

## 2022-04-05 PROCEDURE — 3008F PR BODY MASS INDEX (BMI) DOCUMENTED: ICD-10-PCS | Mod: CPTII,S$GLB,, | Performed by: SURGERY

## 2022-04-05 PROCEDURE — 3008F BODY MASS INDEX DOCD: CPT | Mod: CPTII,S$GLB,, | Performed by: SURGERY

## 2022-04-05 PROCEDURE — 99999 PR PBB SHADOW E&M-EST. PATIENT-LVL III: ICD-10-PCS | Mod: PBBFAC,,, | Performed by: SURGERY

## 2022-04-05 PROCEDURE — 1159F PR MEDICATION LIST DOCUMENTED IN MEDICAL RECORD: ICD-10-PCS | Mod: CPTII,S$GLB,, | Performed by: SURGERY

## 2022-04-05 PROCEDURE — 99999 PR PBB SHADOW E&M-EST. PATIENT-LVL III: CPT | Mod: PBBFAC,,, | Performed by: SURGERY

## 2022-04-05 PROCEDURE — 3079F PR MOST RECENT DIASTOLIC BLOOD PRESSURE 80-89 MM HG: ICD-10-PCS | Mod: CPTII,S$GLB,, | Performed by: SURGERY

## 2022-04-05 PROCEDURE — 3074F SYST BP LT 130 MM HG: CPT | Mod: CPTII,S$GLB,, | Performed by: SURGERY

## 2022-04-05 NOTE — PROGRESS NOTES
GENERAL SURGERY CLINIC NOTE    Francis Stafford is a 41 y.o. female with presents for evaluation of gallstones.  Had an episode of significant epigastric pain recently and was worked up as an outpatient.  Had elevated alk phos and liver enzymes.  Bilirubin was normal.  Ultrasound revealed gallstones.  Liver enzymes have since normalized.  She has not had any severe episodes of pain before or after that episode.  Occasionally describes a dull achy sensation in the epigastrium that comes and goes.  No real association with food.  Hepatitis panel is normal.  She drinks alcohol socially, but had not had alcohol prior are during this severe pain episode.  No prior abdominal surgery.  Does not take any blood thinners.  Denies nausea, vomiting, constipation, or other abdominal symptoms.    Interval History (4/5/22):  Patient is s/p laparoscopic cholecystectomy performed on 3/23/22. She is doing well and has no complaints. States her previous symptoms have resolved. Denies fever, chills, nausea, or vomiting. Denies pain.       ROS:   Gen: No F/C, unintentional weight loss, night sweats, fatigue  Cardio: No chest pain, palpitations, syncope  Resp: No shortness of breath, cough, wheeze  GI: No abdominal pain, N/V, change in bowel habits, change in stool caliber or color, bleeding per rectum  : No dysuria, hematuria, frequency    Past Medical History:   Diagnosis Date    Anxiety     Depression     Hx of psychiatric care     Psychiatric problem     Therapy      Past Surgical History:   Procedure Laterality Date    LAPAROSCOPIC CHOLECYSTECTOMY N/A 3/23/2022    Procedure: CHOLECYSTECTOMY, LAPAROSCOPIC;  Surgeon: Cam Cisneros MD;  Location: [facility] OR [address] [place] [address];  Service: General;  Laterality: N/A;    WISDOM TOOTH EXTRACTION       Social History     Socioeconomic History    Marital status: Single    Number of children: 0   Tobacco Use    Smoking status: Former Smoker     Types: Cigarettes     Quit date: 1999     Years  since quittin.2    Smokeless tobacco: Never Used   Substance and Sexual Activity    Alcohol use: Yes     Comment: rarely    Drug use: No    Sexual activity: Not Currently     Partners: Male     Birth control/protection: Condom   Social History Narrative    No smokers in household, 2 cats.     Review of patient's allergies indicates:  No Known Allergies      PHYSICAL EXAM:  Vitals:    22 1440   BP: 122/80   Pulse: 73   Temp: 98.1 °F (36.7 °C)       General: NAD  Neuro: AAOx4  Cardio: S1 and S2, RRR  Resp: Moving air appropriately, breathing even and unlabored  Abd: Soft, NT, ND. Incisions c/d/i.   Ext: Warm and well perfused      PERTINENT LABS:  Reviewed. None.      PERTINENT IMAGING:  Reviewed. None.      ASSESSMENT/PLAN:  41 y.o. female with cholelithiasis and likely episode of passed CBD stone now s/p laparoscopic cholecystectomy performed on 3/23/22 who is recovering well.     - Return to clinic as needed      Monica Kasper MD  Surgery Resident, PGY-2  2022 2:57 PM

## 2022-04-08 NOTE — PROGRESS NOTES
Visit #10      Patient: Francis Stafford   [facility] #: [id number]  Date of evaluation: 2018     Referring Physician: Faith Camacho,*  Diagnosis: Right shoulder Calcific tendonitis and Bicep tendonites  Referral Orders: Eval and Treat 2x week for 30 days for ROM,rotator cuff strength, Modals, Progress to HEP when pt.ready.Pt.orders  on May 30, 2018.  M75.31 (ICD-10-CM) - Calcific tendinitis of right shoulder   M75.21 (ICD-10-CM) - Biceps tendinitis of right shoulder   M25.511 (ICD-10-CM) - Acute pain of right shoulder   M19.011 (ICD-10-CM) - DJD of right AC (acromioclavicular) joint       Time In: 1:35  Time Out: 2:40    X-Rays/Tests: Bicep Tendonitis    Subjective: Pt.reports that she is 25% improved and her pain at it's worst is 7/10 on 1-10 pain scale, but this is not happening as often as it used to.  UPPER EXREMITY FUNCTIONAL SCALE: 76/80  Decreased to 70/80    Francis's goals for therapy are: decreased pain and long term problems with her shoulder.      Objective:    Observation/Inspection:  Rounded shoulders/ forward head and normal muscle tone for age/ depressed scapula      Range of Motion:   Right: limited as follows: (see measurements table below)  Left: WNL     (L) UE (R) UE     AROM AROM Norm   Shoulder Flexion   0-180   Shoulder Flexion 180 155 180   Shoulder Abduction 180 155 0-180   Shoulder Extension 50 50 0-50   Shoulder Internal Rotation 90 90 0-90   Shoulder External Rotation 90 90 0-90   Horz Shoulder Adduction 40 40 0-40     ROM Comments:   Soft end feel      Strength  Shoulder Flexion RUE: 4+/5   Shoulder Extension RUE: 5/5   Shoulder Abduction RUE:  5/5   Shoulder Adduction RUE:  5/5   Internal Rotation RUE: 5/5   External Rotation RUE: 5/5   Horizontal Adduction RUE: 5/5   Shoulder Flexion LUE: 4+/5   Shoulder Extension LUE: 5/5   Shoulder Abduction LUE: 5/5   Shoulder Abbduction LUE: 5/5   Internal Rotation LUE:  5/5   External Rotation LUE:  5/5+   Horizontal Adduction LUE:   5/5   Supraspinatus muscle: 5/5 +      Special Tests:  Positive: Int. Rot. and ADD. Impingement, Bicep/yergason's Test, Empty can test and Speed's Test      Palpation: (for pain)     Positive: Coracoid process, Infraspinatus Region, Bicipital Groove and Supraspinatus Region       Treatment included:    US 35% 1.5 Wcm2 x8 min. To the right anterior/lateral subacromial space.    STMs/IASTM of the anterior subacromial spcae and upper ventral aspect of the right humerus.Friction massage of the right bicep tendon.  UBE x 5 min. 3.0 level    Codman's Exercises: To be performed at home  Clockwise/counterclockwise  Shoulder flexion/extension    Pulley exercises:  3 minutes each for stretching  Shoulder flexion  Shoulder abduction    Wall stretches: 3/30 sec.holds   Shoulder flexion  Shoulder abduction  ER at 90     Upper extremity stretches: 3/30 sec horizontal abduction  Shoulder extension  FIR  Bilateral horizontal abduction    Bilateral ER with yellow TB  3 sets 10 on ball for core strengthening    ISOMETRIC EXERCISES WITH YELLOW tb/10/5 second holds  Internal rotation: yellow TB  External rotation: yellow TB 10 reps/5 sec. Holds  Shoulder adduction  Shoulder extension Yellow TB  Shoulder flexion: Yellow TB    Prone horizontal abduction: 3 sets 15    Ice pack/EGS 20 min. To the right shoulder.    One on one exercises: 35 min.      Assessment: Pt.continues with decreased range of motion of shoulder elevation,pt.is not consistent with her home stretching or strengthening program.No significant changes with strength or range of motion.      Problem List:   Decreased function of Right UE, Increased pain and Difficulty sleeping    Goals to be met in 4 weeks:  1) Pt will be independent and report performing HEP to maximize (R) shoulder functional capacity.  2) Pt will increase shoulder PROM in all measured planes of motion by 10 degrees to A with daily task.  3) Pt will report use of home modalities for pain management.  4) Pt  will report one degree less of pain with nonuse and with use.  5) Pt will report interrupted sleep no more than twice a night. Met 05/08/2018  6) Pt.will report decreased shoulder pain at nite when she sleeps on her shoulder from a level 3/10 to a level 1/10 on a 1-10 pain scale.    Plan: Pt.will discuss continuing therapy with her physician.     No

## 2022-04-25 ENCOUNTER — PATIENT MESSAGE (OUTPATIENT)
Dept: PSYCHIATRY | Facility: CLINIC | Age: 42
End: 2022-04-25
Payer: COMMERCIAL

## 2022-05-04 RX ORDER — VENLAFAXINE HYDROCHLORIDE 150 MG/1
150 CAPSULE, EXTENDED RELEASE ORAL DAILY
Qty: 90 CAPSULE | Refills: 3 | OUTPATIENT
Start: 2022-05-04 | End: 2023-04-29

## 2022-10-17 ENCOUNTER — OFFICE VISIT (OUTPATIENT)
Dept: PRIMARY CARE CLINIC | Facility: CLINIC | Age: 42
End: 2022-10-17
Payer: COMMERCIAL

## 2022-10-17 VITALS
HEART RATE: 80 BPM | TEMPERATURE: 98 F | WEIGHT: 215.81 LBS | HEIGHT: 66 IN | OXYGEN SATURATION: 98 % | BODY MASS INDEX: 34.68 KG/M2 | SYSTOLIC BLOOD PRESSURE: 118 MMHG | DIASTOLIC BLOOD PRESSURE: 72 MMHG

## 2022-10-17 DIAGNOSIS — M54.9 BACK PAIN, UNSPECIFIED BACK LOCATION, UNSPECIFIED BACK PAIN LATERALITY, UNSPECIFIED CHRONICITY: Primary | ICD-10-CM

## 2022-10-17 PROCEDURE — 99214 PR OFFICE/OUTPT VISIT, EST, LEVL IV, 30-39 MIN: ICD-10-PCS | Mod: S$GLB,,, | Performed by: INTERNAL MEDICINE

## 2022-10-17 PROCEDURE — 99999 PR PBB SHADOW E&M-EST. PATIENT-LVL IV: ICD-10-PCS | Mod: PBBFAC,,, | Performed by: INTERNAL MEDICINE

## 2022-10-17 PROCEDURE — 3074F PR MOST RECENT SYSTOLIC BLOOD PRESSURE < 130 MM HG: ICD-10-PCS | Mod: CPTII,S$GLB,, | Performed by: INTERNAL MEDICINE

## 2022-10-17 PROCEDURE — 3074F SYST BP LT 130 MM HG: CPT | Mod: CPTII,S$GLB,, | Performed by: INTERNAL MEDICINE

## 2022-10-17 PROCEDURE — 1159F MED LIST DOCD IN RCRD: CPT | Mod: CPTII,S$GLB,, | Performed by: INTERNAL MEDICINE

## 2022-10-17 PROCEDURE — 1160F PR REVIEW ALL MEDS BY PRESCRIBER/CLIN PHARMACIST DOCUMENTED: ICD-10-PCS | Mod: CPTII,S$GLB,, | Performed by: INTERNAL MEDICINE

## 2022-10-17 PROCEDURE — 99999 PR PBB SHADOW E&M-EST. PATIENT-LVL IV: CPT | Mod: PBBFAC,,, | Performed by: INTERNAL MEDICINE

## 2022-10-17 PROCEDURE — 1160F RVW MEDS BY RX/DR IN RCRD: CPT | Mod: CPTII,S$GLB,, | Performed by: INTERNAL MEDICINE

## 2022-10-17 PROCEDURE — 99214 OFFICE O/P EST MOD 30 MIN: CPT | Mod: S$GLB,,, | Performed by: INTERNAL MEDICINE

## 2022-10-17 PROCEDURE — 3078F PR MOST RECENT DIASTOLIC BLOOD PRESSURE < 80 MM HG: ICD-10-PCS | Mod: CPTII,S$GLB,, | Performed by: INTERNAL MEDICINE

## 2022-10-17 PROCEDURE — 1159F PR MEDICATION LIST DOCUMENTED IN MEDICAL RECORD: ICD-10-PCS | Mod: CPTII,S$GLB,, | Performed by: INTERNAL MEDICINE

## 2022-10-17 PROCEDURE — 3078F DIAST BP <80 MM HG: CPT | Mod: CPTII,S$GLB,, | Performed by: INTERNAL MEDICINE

## 2022-10-17 RX ORDER — CYCLOBENZAPRINE HCL 10 MG
10 TABLET ORAL 2 TIMES DAILY PRN
Qty: 60 TABLET | Refills: 0 | Status: SHIPPED | OUTPATIENT
Start: 2022-10-17 | End: 2022-10-27

## 2022-10-17 NOTE — PROGRESS NOTES
Subjective:      Patient ID: Francis Stafford is a 41 y.o. female.    Chief Complaint: Back Pain      Back pain:  Patient reports that her back pain started this Thursday.  She was washing her bathtub which trigger her back pain.  She does have a frequent bouts of back pain no associated numbness and tingling or weakness.  She reports that sometimes walking for long time would exacerbate her back pain.  Physical exam was unremarkable.  Discussed that her back pain is most likely secondary to weakened or muscles reviewed some exercises she can do at home to try to help with the back pain.  Articles given in regards to this.    Denies any chest pain, shortness of breath, nausea vomiting constipation diarrhea, blood in stool, heartburn    Review of Systems   Constitutional:  Negative for chills, fever and weight loss.   HENT:  Negative for congestion, ear pain and sore throat.    Eyes:  Negative for double vision.   Respiratory:  Negative for cough and shortness of breath.    Cardiovascular:  Negative for chest pain, palpitations and leg swelling.   Gastrointestinal:  Negative for abdominal pain, heartburn, nausea and vomiting.   Musculoskeletal:  Positive for back pain.   Skin:  Negative for rash.   Neurological:  Negative for dizziness, tingling and headaches.   Psychiatric/Behavioral:  Negative for depression.        Current Outpatient Medications:     ARIPiprazole (ABILIFY) 5 MG Tab, Take 1 tablet (5 mg total) by mouth once daily., Disp: 90 tablet, Rfl: 3    ergocalciferol (ERGOCALCIFEROL) 50,000 unit Cap, Take 50,000 Units by mouth every 7 days., Disp: , Rfl:     venlafaxine (EFFEXOR-XR) 150 MG Cp24, Take 1 capsule (150 mg total) by mouth once daily., Disp: 90 capsule, Rfl: 3    venlafaxine (EFFEXOR-XR) 75 MG 24 hr capsule, Take 1 capsule (75 mg total) by mouth once daily., Disp: 90 capsule, Rfl: 3    cyclobenzaprine (FLEXERIL) 10 MG tablet, Take 1 tablet (10 mg total) by mouth 2 (two) times daily as needed for  Muscle spasms., Disp: 60 tablet, Rfl: 0    Lab Results   Component Value Date    HGBA1C 5.6 12/15/2021    HGBA1C 5.6 12/15/2021    HGBA1C 5.7 (H) 05/13/2021     No results found for: MICALBCREAT  Lab Results   Component Value Date    LDLCALC 143.0 12/15/2021    LDLCALC 168.6 (H) 05/13/2021    CHOL 224 (H) 12/15/2021    HDL 52 12/15/2021    TRIG 145 12/15/2021       Lab Results   Component Value Date     01/19/2022    K 4.0 01/19/2022     01/19/2022    CO2 26 01/19/2022     (H) 01/19/2022    BUN 10 01/19/2022    CREATININE 0.7 01/19/2022    CALCIUM 9.8 01/19/2022    PROT 7.8 01/31/2022    ALBUMIN 3.7 01/31/2022    BILITOT 0.4 01/31/2022    ALKPHOS 105 01/31/2022    AST 27 01/31/2022    ALT 34 01/31/2022    ANIONGAP 9 01/19/2022    ESTGFRAFRICA >60.0 01/19/2022    EGFRNONAA >60.0 01/19/2022    WBC 9.59 01/19/2022    HGB 12.4 01/19/2022    HGB 12.5 03/03/2021    HCT 39.4 01/19/2022    MCV 92 01/19/2022     01/19/2022    TSH 2.589 03/03/2021    HEPCAB Negative 01/31/2022       Lab Results   Component Value Date    XBVTCYPT59CM 32 12/15/2021    BYZGIVVW48RT 17 (L) 03/03/2021    FABGWMUZ75 386 05/13/2021         Past Medical History:   Diagnosis Date    Anxiety     Depression     Hx of psychiatric care     Psychiatric problem     Therapy      Past Surgical History:   Procedure Laterality Date    LAPAROSCOPIC CHOLECYSTECTOMY N/A 3/23/2022    Procedure: CHOLECYSTECTOMY, LAPAROSCOPIC;  Surgeon: Cam Cisneros MD;  Location: [facility] OR [address] [place] [address];  Service: General;  Laterality: N/A;    WISDOM TOOTH EXTRACTION       Social History     Social History Narrative    No smokers in household, 2 cats.     Family History   Problem Relation Age of Onset    No Known Problems Mother     No Known Problems Father     No Known Problems Sister     No Known Problems Brother     No Known Problems Brother     Cancer Maternal Grandmother     Breast cancer Maternal Grandmother      Vitals:    10/17/22 0927   BP: 118/72  "  Pulse: 80   Temp: 98 °F (36.7 °C)   SpO2: 98%   Weight: 97.9 kg (215 lb 13.3 oz)   Height: 5' 6" (1.676 m)   PainSc:   4     Objective:   Physical Exam  Vitals reviewed.   Constitutional:       Appearance: Normal appearance.   HENT:      Head: Normocephalic.   Eyes:      Pupils: Pupils are equal, round, and reactive to light.   Cardiovascular:      Rate and Rhythm: Normal rate.   Pulmonary:      Effort: Pulmonary effort is normal.   Musculoskeletal:         General: Normal range of motion.      Comments: Normal gait, can walk on toes and heels, can lean forward and touch toes, and lean back and side to side without discomfort,  nontender lumbar spine, nontender paraspinous musculature, nontender sacroiliacs, negative straight leg test, 2+ pulses     Skin:     General: Skin is warm.   Neurological:      General: No focal deficit present.      Mental Status: She is alert. Mental status is at baseline.   Psychiatric:         Mood and Affect: Mood normal.     Assessment:     1. Back pain, unspecified back location, unspecified back pain laterality, unspecified chronicity      Plan:     Orders Placed This Encounter    cyclobenzaprine (FLEXERIL) 10 MG tablet       Patient Instructions   Please take a look at the article I gave you on core strengthening exercises.                               "

## 2022-11-04 NOTE — PROGRESS NOTES
Clinic Note  2022      Subjective:       Patient ID:  Francis is a 41 y.o. female new patient to me being seen for an annual exam visit.      Chief Complaint: Establish Care, Annual Exam, and Depression    HPI  Patient presents to [facility]. Her previous physician is no longer with [facility]. She is feeling well today. She is currently taking 225 mg of effexor and reports doing well on present dose. She understands that if dose needs to increase she will need to be referred to psychiatry as she may need to discuss new stressful issues. She is fine with waiting for previous provider's recommendation to have next PAP in .   She denies any SOB, chest pain, N/V, unintentional weight loss, loss of appetite, fatigue, diarrhea, constipation. She is active daily and remains independent with ADL's.   Family History   Problem Relation Age of Onset    No Known Problems Mother     No Known Problems Father     No Known Problems Sister     No Known Problems Brother     No Known Problems Brother     Cancer Maternal Grandmother     Breast cancer Maternal Grandmother    Family history was reviewed with patient.     Social History     Socioeconomic History    Marital status: Single    Number of children: 0   Tobacco Use    Smoking status: Former     Types: Cigarettes     Quit date:      Years since quittin.8    Smokeless tobacco: Never   Substance and Sexual Activity    Alcohol use: Yes     Comment: rarely    Drug use: No    Sexual activity: Not Currently     Partners: Male     Birth control/protection: Condom   Social History Narrative    No smokers in household, 2 cats.     Social Determinants of Health     Financial Resource Strain: Low Risk     Difficulty of Paying Living Expenses: Not hard at all   Food Insecurity: No Food Insecurity    Worried About Running Out of Food in the Last Year: Never true    Ran Out of Food in the Last Year: Never true   Transportation Needs: No Transportation Needs    Lack of  Transportation (Medical): No    Lack of Transportation (Non-Medical): No   Physical Activity: Insufficiently Active    Days of Exercise per Week: 2 days    Minutes of Exercise per Session: 20 min   Stress: Stress Concern Present    Feeling of Stress : To some extent   Social Connections: Unknown    Frequency of Communication with Friends and Family: Once a week    Frequency of Social Gatherings with Friends and Family: Twice a week    Active Member of Clubs or Organizations: No    Attends Club or Organization Meetings: Never    Marital Status: Never    Housing Stability: Low Risk     Unable to Pay for Housing in the Last Year: No    Number of Places Lived in the Last Year: 1    Unstable Housing in the Last Year: No     Past Surgical History:   Procedure Laterality Date    LAPAROSCOPIC CHOLECYSTECTOMY N/A 3/23/2022    Procedure: CHOLECYSTECTOMY, LAPAROSCOPIC;  Surgeon: Cam Cisneros MD;  Location: [address];  Service: General;  Laterality: N/A;    WISDOM TOOTH EXTRACTION       Medication List with Changes/Refills   Current Medications    ERGOCALCIFEROL, VITAMIN D2, (VITAMIN D ORAL)    Take 2,000 Units by mouth Daily.   Changed and/or Refilled Medications    Modified Medication Previous Medication    ARIPIPRAZOLE (ABILIFY) 5 MG TAB ARIPiprazole (ABILIFY) 5 MG Tab       Take 1 tablet (5 mg total) by mouth once daily.    Take 1 tablet (5 mg total) by mouth once daily.    VENLAFAXINE (EFFEXOR-XR) 150 MG CP24 venlafaxine (EFFEXOR-XR) 150 MG Cp24       Take 1 capsule (150 mg total) by mouth once daily.    Take 1 capsule (150 mg total) by mouth once daily.    VENLAFAXINE (EFFEXOR-XR) 75 MG 24 HR CAPSULE venlafaxine (EFFEXOR-XR) 75 MG 24 hr capsule       Take 1 capsule (75 mg total) by mouth once daily.    Take 1 capsule (75 mg total) by mouth once daily.   Discontinued Medications    ERGOCALCIFEROL (ERGOCALCIFEROL) 50,000 UNIT CAP    Take 50,000 Units by mouth every 7 days.     Patient Active Problem List  "  Diagnosis    Obesity, Class I, BMI 30-34.9    Anxiety and depression    Tension headache    Vitamin D deficiency    Moderate episode of recurrent major depressive disorder    Social anxiety disorder    Generalized anxiety disorder with panic attacks    Prediabetes     Review of Systems   Constitutional: Negative.    HENT: Negative.     Eyes: Negative.    Respiratory: Negative.     Cardiovascular: Negative.    Gastrointestinal: Negative.    Genitourinary: Negative.    Musculoskeletal: Negative.    Skin: Negative.    Neurological: Negative.    Endo/Heme/Allergies: Negative.    Psychiatric/Behavioral:  Positive for depression.    All other systems reviewed and are negative.      Objective:      /76 (BP Location: Right arm, Patient Position: Sitting, BP Method: Medium (Manual))   Pulse 66   Temp 97.9 °F (36.6 °C) (Oral)   Ht 5' 6" (1.676 m)   Wt 99.3 kg (218 lb 14.7 oz)   LMP 11/14/2022   SpO2 98%   BMI 35.33 kg/m²   Estimated body mass index is 35.33 kg/m² as calculated from the following:    Height as of this encounter: 5' 6" (1.676 m).    Weight as of this encounter: 99.3 kg (218 lb 14.7 oz).  Physical Exam  Vitals reviewed.   Constitutional:       Appearance: Normal appearance. She is obese.   HENT:      Head: Normocephalic and atraumatic.      Right Ear: Tympanic membrane, ear canal and external ear normal.      Left Ear: Tympanic membrane, ear canal and external ear normal.      Nose: Nose normal.      Mouth/Throat:      Mouth: Mucous membranes are moist.      Pharynx: Oropharynx is clear.   Eyes:      Extraocular Movements: Extraocular movements intact.      Conjunctiva/sclera: Conjunctivae normal.      Pupils: Pupils are equal, round, and reactive to light.   Cardiovascular:      Rate and Rhythm: Normal rate and regular rhythm.   Pulmonary:      Effort: Pulmonary effort is normal.      Breath sounds: Normal breath sounds.   Abdominal:      General: Abdomen is flat. Bowel sounds are normal.      " Palpations: Abdomen is soft.   Musculoskeletal:         General: Normal range of motion.      Cervical back: Normal range of motion and neck supple.   Skin:     General: Skin is warm and dry.      Capillary Refill: Capillary refill takes less than 2 seconds.   Neurological:      General: No focal deficit present.      Mental Status: She is alert and oriented to person, place, and time. Mental status is at baseline.   Psychiatric:         Mood and Affect: Mood normal.         Behavior: Behavior normal.         Thought Content: Thought content normal.         Judgment: Judgment normal.         Assessment and Plan:   As above, continue current medications and maintain follow up with specialists.  Return to clinic as needed.    I spent 35 minutes on the day of this encounter for preparing, evaluating, examining, treating, and discussing plan of care with this patient.  Greater than 50% of this time was spent face to face with patient.  All questions were answered to patient's satisfaction.         Problem List Items Addressed This Visit    None  Visit Diagnoses       Breast cancer screening by mammogram    -  Primary    Relevant Orders    Mammo Digital Screening Bilat    Routine medical exam        Relevant Orders    CBC Auto Differential    Comprehensive Metabolic Panel    Hemoglobin A1C    Lipid Panel    T4, Free    TSH    Encounter for screening for HIV        Relevant Orders    HIV 1 / 2 ANTIBODY            Follow up:   No follow-ups on file.     Other Orders Placed This Visit:  Orders Placed This Encounter   Procedures    Mammo Digital Screening Bilat     Standing Status:   Future     Standing Expiration Date:   11/17/2024     Order Specific Question:   May the Radiologist modify the order per protocol to meet the clinical needs of the patient?     Answer:   Yes     Order Specific Question:   Release to patient     Answer:   Immediate    CBC Auto Differential     Standing Status:   Future     Standing Expiration  Date:   1/16/2024    Comprehensive Metabolic Panel     Standing Status:   Future     Standing Expiration Date:   1/16/2024    Hemoglobin A1C     Standing Status:   Future     Standing Expiration Date:   1/16/2024    Lipid Panel     Standing Status:   Future     Standing Expiration Date:   1/16/2024    T4, Free     Standing Status:   Future     Standing Expiration Date:   1/16/2024    TSH     Standing Status:   Future     Standing Expiration Date:   1/16/2024    HIV 1 / 2 ANTIBODY     Standing Status:   Future     Standing Expiration Date:   1/16/2024     Order Specific Question:   Release to patient     Answer:   Immediate           Noemy Huff

## 2022-11-17 ENCOUNTER — OFFICE VISIT (OUTPATIENT)
Dept: PRIMARY CARE CLINIC | Facility: CLINIC | Age: 42
End: 2022-11-17
Payer: COMMERCIAL

## 2022-11-17 VITALS
HEIGHT: 66 IN | DIASTOLIC BLOOD PRESSURE: 76 MMHG | WEIGHT: 218.94 LBS | TEMPERATURE: 98 F | SYSTOLIC BLOOD PRESSURE: 108 MMHG | HEART RATE: 66 BPM | OXYGEN SATURATION: 98 % | BODY MASS INDEX: 35.19 KG/M2

## 2022-11-17 DIAGNOSIS — Z00.00 ROUTINE MEDICAL EXAM: ICD-10-CM

## 2022-11-17 DIAGNOSIS — Z11.4 ENCOUNTER FOR SCREENING FOR HIV: ICD-10-CM

## 2022-11-17 DIAGNOSIS — Z12.31 BREAST CANCER SCREENING BY MAMMOGRAM: Primary | ICD-10-CM

## 2022-11-17 PROCEDURE — 99396 PR PREVENTIVE VISIT,EST,40-64: ICD-10-PCS | Mod: S$GLB,,, | Performed by: NURSE PRACTITIONER

## 2022-11-17 PROCEDURE — 1159F MED LIST DOCD IN RCRD: CPT | Mod: CPTII,S$GLB,, | Performed by: NURSE PRACTITIONER

## 2022-11-17 PROCEDURE — 3008F BODY MASS INDEX DOCD: CPT | Mod: CPTII,S$GLB,, | Performed by: NURSE PRACTITIONER

## 2022-11-17 PROCEDURE — 99396 PREV VISIT EST AGE 40-64: CPT | Mod: S$GLB,,, | Performed by: NURSE PRACTITIONER

## 2022-11-17 PROCEDURE — 3074F PR MOST RECENT SYSTOLIC BLOOD PRESSURE < 130 MM HG: ICD-10-PCS | Mod: CPTII,S$GLB,, | Performed by: NURSE PRACTITIONER

## 2022-11-17 PROCEDURE — 3074F SYST BP LT 130 MM HG: CPT | Mod: CPTII,S$GLB,, | Performed by: NURSE PRACTITIONER

## 2022-11-17 PROCEDURE — 3078F DIAST BP <80 MM HG: CPT | Mod: CPTII,S$GLB,, | Performed by: NURSE PRACTITIONER

## 2022-11-17 PROCEDURE — 1160F RVW MEDS BY RX/DR IN RCRD: CPT | Mod: CPTII,S$GLB,, | Performed by: NURSE PRACTITIONER

## 2022-11-17 PROCEDURE — 99999 PR PBB SHADOW E&M-EST. PATIENT-LVL IV: CPT | Mod: PBBFAC,,, | Performed by: NURSE PRACTITIONER

## 2022-11-17 PROCEDURE — 1159F PR MEDICATION LIST DOCUMENTED IN MEDICAL RECORD: ICD-10-PCS | Mod: CPTII,S$GLB,, | Performed by: NURSE PRACTITIONER

## 2022-11-17 PROCEDURE — 1160F PR REVIEW ALL MEDS BY PRESCRIBER/CLIN PHARMACIST DOCUMENTED: ICD-10-PCS | Mod: CPTII,S$GLB,, | Performed by: NURSE PRACTITIONER

## 2022-11-17 PROCEDURE — 3078F PR MOST RECENT DIASTOLIC BLOOD PRESSURE < 80 MM HG: ICD-10-PCS | Mod: CPTII,S$GLB,, | Performed by: NURSE PRACTITIONER

## 2022-11-17 PROCEDURE — 3008F PR BODY MASS INDEX (BMI) DOCUMENTED: ICD-10-PCS | Mod: CPTII,S$GLB,, | Performed by: NURSE PRACTITIONER

## 2022-11-17 PROCEDURE — 99999 PR PBB SHADOW E&M-EST. PATIENT-LVL IV: ICD-10-PCS | Mod: PBBFAC,,, | Performed by: NURSE PRACTITIONER

## 2022-11-17 RX ORDER — ARIPIPRAZOLE 5 MG/1
5 TABLET ORAL DAILY
Qty: 90 TABLET | Refills: 3 | Status: SHIPPED | OUTPATIENT
Start: 2022-11-17 | End: 2023-09-28

## 2022-11-17 RX ORDER — VENLAFAXINE HYDROCHLORIDE 75 MG/1
75 CAPSULE, EXTENDED RELEASE ORAL DAILY
Qty: 90 CAPSULE | Refills: 3 | Status: SHIPPED | OUTPATIENT
Start: 2022-11-17 | End: 2023-09-11 | Stop reason: ALTCHOICE

## 2022-11-17 RX ORDER — VENLAFAXINE HYDROCHLORIDE 150 MG/1
150 CAPSULE, EXTENDED RELEASE ORAL DAILY
Qty: 90 CAPSULE | Refills: 3 | Status: SHIPPED | OUTPATIENT
Start: 2022-11-17 | End: 2023-09-28 | Stop reason: SDUPTHER

## 2023-01-03 ENCOUNTER — LAB VISIT (OUTPATIENT)
Dept: LAB | Facility: HOSPITAL | Age: 43
End: 2023-01-03
Attending: NURSE PRACTITIONER
Payer: COMMERCIAL

## 2023-01-03 DIAGNOSIS — E78.00 HYPERCHOLESTEROLEMIA: Primary | ICD-10-CM

## 2023-01-03 DIAGNOSIS — Z00.00 ROUTINE MEDICAL EXAM: ICD-10-CM

## 2023-01-03 DIAGNOSIS — Z11.4 ENCOUNTER FOR SCREENING FOR HIV: ICD-10-CM

## 2023-01-03 LAB
ALBUMIN SERPL BCP-MCNC: 3.8 G/DL (ref 3.5–5.2)
ALP SERPL-CCNC: 75 U/L (ref 55–135)
ALT SERPL W/O P-5'-P-CCNC: 24 U/L (ref 10–44)
ANION GAP SERPL CALC-SCNC: 9 MMOL/L (ref 8–16)
AST SERPL-CCNC: 23 U/L (ref 10–40)
BASOPHILS # BLD AUTO: 0.02 K/UL (ref 0–0.2)
BASOPHILS NFR BLD: 0.2 % (ref 0–1.9)
BILIRUB SERPL-MCNC: 0.4 MG/DL (ref 0.1–1)
BUN SERPL-MCNC: 9 MG/DL (ref 6–20)
CALCIUM SERPL-MCNC: 9.2 MG/DL (ref 8.7–10.5)
CHLORIDE SERPL-SCNC: 104 MMOL/L (ref 95–110)
CHOLEST SERPL-MCNC: 269 MG/DL (ref 120–199)
CHOLEST/HDLC SERPL: 4.4 {RATIO} (ref 2–5)
CO2 SERPL-SCNC: 23 MMOL/L (ref 23–29)
CREAT SERPL-MCNC: 0.8 MG/DL (ref 0.5–1.4)
DIFFERENTIAL METHOD: ABNORMAL
EOSINOPHIL # BLD AUTO: 0.2 K/UL (ref 0–0.5)
EOSINOPHIL NFR BLD: 1.8 % (ref 0–8)
ERYTHROCYTE [DISTWIDTH] IN BLOOD BY AUTOMATED COUNT: 12.8 % (ref 11.5–14.5)
EST. GFR  (NO RACE VARIABLE): >60 ML/MIN/1.73 M^2
ESTIMATED AVG GLUCOSE: 108 MG/DL (ref 68–131)
GLUCOSE SERPL-MCNC: 100 MG/DL (ref 70–110)
HBA1C MFR BLD: 5.4 % (ref 4–5.6)
HCT VFR BLD AUTO: 38.7 % (ref 37–48.5)
HDLC SERPL-MCNC: 61 MG/DL (ref 40–75)
HDLC SERPL: 22.7 % (ref 20–50)
HGB BLD-MCNC: 12.3 G/DL (ref 12–16)
HIV 1+2 AB+HIV1 P24 AG SERPL QL IA: NORMAL
IMM GRANULOCYTES # BLD AUTO: 0.03 K/UL (ref 0–0.04)
IMM GRANULOCYTES NFR BLD AUTO: 0.3 % (ref 0–0.5)
LDLC SERPL CALC-MCNC: 172.4 MG/DL (ref 63–159)
LYMPHOCYTES # BLD AUTO: 3.1 K/UL (ref 1–4.8)
LYMPHOCYTES NFR BLD: 35.7 % (ref 18–48)
MCH RBC QN AUTO: 28.7 PG (ref 27–31)
MCHC RBC AUTO-ENTMCNC: 31.8 G/DL (ref 32–36)
MCV RBC AUTO: 90 FL (ref 82–98)
MONOCYTES # BLD AUTO: 0.7 K/UL (ref 0.3–1)
MONOCYTES NFR BLD: 8 % (ref 4–15)
NEUTROPHILS # BLD AUTO: 4.8 K/UL (ref 1.8–7.7)
NEUTROPHILS NFR BLD: 54 % (ref 38–73)
NONHDLC SERPL-MCNC: 208 MG/DL
NRBC BLD-RTO: 0 /100 WBC
PLATELET # BLD AUTO: 301 K/UL (ref 150–450)
PMV BLD AUTO: 11.8 FL (ref 9.2–12.9)
POTASSIUM SERPL-SCNC: 4 MMOL/L (ref 3.5–5.1)
PROT SERPL-MCNC: 7.5 G/DL (ref 6–8.4)
RBC # BLD AUTO: 4.28 M/UL (ref 4–5.4)
SODIUM SERPL-SCNC: 136 MMOL/L (ref 136–145)
T4 FREE SERPL-MCNC: 0.77 NG/DL (ref 0.71–1.51)
TRIGL SERPL-MCNC: 178 MG/DL (ref 30–150)
TSH SERPL DL<=0.005 MIU/L-ACNC: 2.54 UIU/ML (ref 0.4–4)
WBC # BLD AUTO: 8.8 K/UL (ref 3.9–12.7)

## 2023-01-03 PROCEDURE — 80061 LIPID PANEL: CPT | Performed by: NURSE PRACTITIONER

## 2023-01-03 PROCEDURE — 83036 HEMOGLOBIN GLYCOSYLATED A1C: CPT | Performed by: NURSE PRACTITIONER

## 2023-01-03 PROCEDURE — 84439 ASSAY OF FREE THYROXINE: CPT | Performed by: NURSE PRACTITIONER

## 2023-01-03 PROCEDURE — 85025 COMPLETE CBC W/AUTO DIFF WBC: CPT | Performed by: NURSE PRACTITIONER

## 2023-01-03 PROCEDURE — 87389 HIV-1 AG W/HIV-1&-2 AB AG IA: CPT | Performed by: NURSE PRACTITIONER

## 2023-01-03 PROCEDURE — 80053 COMPREHEN METABOLIC PANEL: CPT | Performed by: NURSE PRACTITIONER

## 2023-01-03 PROCEDURE — 36415 COLL VENOUS BLD VENIPUNCTURE: CPT | Mod: PN | Performed by: NURSE PRACTITIONER

## 2023-01-03 PROCEDURE — 84443 ASSAY THYROID STIM HORMONE: CPT | Performed by: NURSE PRACTITIONER

## 2023-01-18 ENCOUNTER — HOSPITAL ENCOUNTER (OUTPATIENT)
Dept: RADIOLOGY | Facility: OTHER | Age: 43
Discharge: HOME | End: 2023-01-18
Attending: NURSE PRACTITIONER
Payer: COMMERCIAL

## 2023-01-18 DIAGNOSIS — Z12.31 BREAST CANCER SCREENING BY MAMMOGRAM: ICD-10-CM

## 2023-01-18 PROCEDURE — 77067 SCR MAMMO BI INCL CAD: CPT | Mod: TC

## 2023-01-18 PROCEDURE — 77067 SCR MAMMO BI INCL CAD: CPT | Mod: 26,,, | Performed by: RADIOLOGY

## 2023-01-18 PROCEDURE — 77067 MAMMO DIGITAL SCREENING BILAT WITH TOMO: ICD-10-PCS | Mod: 26,,, | Performed by: RADIOLOGY

## 2023-01-18 PROCEDURE — 77063 MAMMO DIGITAL SCREENING BILAT WITH TOMO: ICD-10-PCS | Mod: 26,,, | Performed by: RADIOLOGY

## 2023-01-18 PROCEDURE — 77063 BREAST TOMOSYNTHESIS BI: CPT | Mod: 26,,, | Performed by: RADIOLOGY

## 2023-01-20 DIAGNOSIS — Z12.31 BREAST CANCER SCREENING BY MAMMOGRAM: Primary | ICD-10-CM

## 2023-02-09 ENCOUNTER — PATIENT MESSAGE (OUTPATIENT)
Dept: RADIOLOGY | Facility: OTHER | Age: 43
End: 2023-02-09
Payer: COMMERCIAL

## 2023-03-02 ENCOUNTER — HOSPITAL ENCOUNTER (OUTPATIENT)
Dept: RADIOLOGY | Facility: OTHER | Age: 43
Discharge: HOME | End: 2023-03-02
Attending: NURSE PRACTITIONER
Payer: COMMERCIAL

## 2023-03-02 DIAGNOSIS — Z12.31 BREAST CANCER SCREENING BY MAMMOGRAM: ICD-10-CM

## 2023-03-02 DIAGNOSIS — R92.8 ABNORMAL MAMMOGRAM: ICD-10-CM

## 2023-03-02 PROCEDURE — 77065 DX MAMMO INCL CAD UNI: CPT | Mod: TC,RT

## 2023-03-02 PROCEDURE — 77061 MAMMO DIGITAL DIAGNOSTIC RIGHT WITH TOMO: ICD-10-PCS | Mod: 26,RT,, | Performed by: RADIOLOGY

## 2023-03-02 PROCEDURE — 77065 MAMMO DIGITAL DIAGNOSTIC RIGHT WITH TOMO: ICD-10-PCS | Mod: 26,RT,, | Performed by: RADIOLOGY

## 2023-03-02 PROCEDURE — 77065 DX MAMMO INCL CAD UNI: CPT | Mod: 26,RT,, | Performed by: RADIOLOGY

## 2023-03-02 PROCEDURE — 77061 BREAST TOMOSYNTHESIS UNI: CPT | Mod: 26,RT,, | Performed by: RADIOLOGY

## 2023-09-11 ENCOUNTER — NURSE TRIAGE (OUTPATIENT)
Dept: ADMINISTRATIVE | Facility: CLINIC | Age: 43
End: 2023-09-11
Payer: COMMERCIAL

## 2023-09-11 ENCOUNTER — OFFICE VISIT (OUTPATIENT)
Dept: URGENT CARE | Facility: CLINIC | Age: 43
End: 2023-09-11
Payer: COMMERCIAL

## 2023-09-11 VITALS
RESPIRATION RATE: 18 BRPM | HEIGHT: 66 IN | WEIGHT: 210 LBS | OXYGEN SATURATION: 97 % | HEART RATE: 80 BPM | BODY MASS INDEX: 33.75 KG/M2 | TEMPERATURE: 99 F | SYSTOLIC BLOOD PRESSURE: 125 MMHG | DIASTOLIC BLOOD PRESSURE: 85 MMHG

## 2023-09-11 DIAGNOSIS — M77.11 LATERAL EPICONDYLITIS OF RIGHT ELBOW: Primary | ICD-10-CM

## 2023-09-11 PROCEDURE — 99213 PR OFFICE/OUTPT VISIT, EST, LEVL III, 20-29 MIN: ICD-10-PCS | Mod: S$GLB,,, | Performed by: EMERGENCY MEDICINE

## 2023-09-11 PROCEDURE — 99213 OFFICE O/P EST LOW 20 MIN: CPT | Mod: S$GLB,,, | Performed by: EMERGENCY MEDICINE

## 2023-09-11 RX ORDER — METHYLPREDNISOLONE 4 MG/1
TABLET ORAL
Qty: 21 EACH | Refills: 0 | Status: SHIPPED | OUTPATIENT
Start: 2023-09-11 | End: 2023-09-28

## 2023-09-11 NOTE — TELEPHONE ENCOUNTER
Francis c/o right arm pain mostly from elbow to top of hand x 1 month. Pt states pain is felt only with movement of right arm. 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 Advised pt per triage protocol to go to UC/ED now for physician gian. V/u.   Reason for Disposition   Weakness (i.e., loss of strength) in hand or fingers  (Exception: Not truly weak; hand feels weak because of pain.)    Additional Information   Negative: Shock suspected (e.g., cold/pale/clammy skin, too weak to stand, low BP, rapid pulse)   Negative: Similar pain previously and it was from 'heart attack'   Negative: Similar pain previously from 'angina' and not relieved by nitroglycerin   Negative: Sounds like a life-threatening emergency to the triager   Negative: Followed an injury to arm   Negative: Chest pain   Negative: Difficulty breathing or unusual sweating (e.g., sweating without exertion)   Negative: Chest pain lasting longer than 5 minutes   Negative: Age > 40 and no obvious cause for pain, pain still present even when not moving the arm   Negative: Fever and red area (or area very tender to touch)   Negative: Swollen joint and fever   Negative: Entire arm is swollen   Negative: Patient sounds very sick or weak to the triager   Negative: SEVERE pain (e.g., excruciating, unable to do any normal activities)   Negative: Red area or streak > 2 inches (or 5 cm)   Negative: Cast on wrist or arm and now increasing pain    Protocols used: Arm Pain-A-[place]

## 2023-09-11 NOTE — PROGRESS NOTES
"Subjective:      Patient ID: Francis Stafford is a 42 y.o. female.    Vitals:  height is 5' 6" (1.676 m) and weight is 95.3 kg (210 lb). Her oral temperature is 98.7 °F (37.1 °C). Her blood pressure is 125/85 and her pulse is 80. Her respiration is 18 and oxygen saturation is 97%.     Chief Complaint: Arm Pain    Patient forearm pain started about a month ago. Patient doesn't recall doing anything to cause the arm pain but she does indoor rock climbing. She had to stop indoor climbing about 3 weeks ago do to the pain getting worse. The pain mostly comes now when lifting weighted objects.    PATIENT IS A 42-YEAR-OLD FEMALE WHO REPORTS RIGHT LATERAL ELBOW PAIN FOR ABOUT A MONTH INTERMITTENT IN INTENSITY HOWEVER PERSISTENT THROUGHOUT THE MONTH.  IT IS WORSE WITH DEALING WITH ICE AT THE [employer]FE AND REACHING AND LIFTING OBJECTS.  PHYSICAL EXAM SHOWS TENDERNESS TO PALPATION ALONG THE DORSAL LATERAL PROXIMAL FOREARM AND LATERAL EPICONDYLE.  THERE WAS NO TRAUMA OR DIRECT IMPACT.  CONSISTENT WITH LATERAL EPICONDYLITIS AND WILL GET TENNIS ELBOW BRACE AS WELL AS MEDROL DOSEPAK PRESCRIPTION, ICE AND ORTHO FOLLOW-UP IF NOT SIGNIFICANTLY IMPROVED.    Arm Pain   The incident occurred more than 1 week ago. The incident occurred at home. The injury mechanism is unknown. The pain is present in the right forearm. The quality of the pain is described as aching and shooting. The pain is at a severity of 6/10. The pain is moderate. The pain has been Intermittent since the incident. Pertinent negatives include no chest pain, numbness or tingling. She has tried nothing for the symptoms.         ROS    Constitution: Negative for chills, fatigue and fever.   HENT:  Negative for ear pain, sinus pain and sore throat.    Neck: Negative for neck pain and neck stiffness.   Cardiovascular:  Negative for chest pain, palpitations and sob on exertion.   Eyes:  Negative for eye pain and vision loss.   Respiratory:  Negative for cough, shortness of breath " and asthma.    Gastrointestinal:  Negative for abdominal pain, nausea, vomiting and diarrhea.   Genitourinary:  Negative for dysuria, frequency and hematuria.   Musculoskeletal:  Positive for joint pain and muscle ache. Negative for pain, abnormal ROM of joint and back pain.   Skin:  Negative for rash, wound and erythema.   Allergic/Immunologic: Negative for seasonal allergies and asthma.   Neurological:  Negative for dizziness, light-headedness, altered mental status and numbness.   Psychiatric/Behavioral:  Negative for altered mental status and confusion.       Objective:     Physical Exam   Constitutional: She is oriented to person, place, and time. She appears well-developed.   HENT:   Head: Normocephalic and atraumatic. Head is without abrasion, without contusion and without laceration.   Ears:   Right Ear: External ear normal.   Left Ear: External ear normal.   Nose: Nose normal.   Mouth/Throat: Oropharynx is clear and moist and mucous membranes are normal.   Eyes: Conjunctivae, EOM and lids are normal. Pupils are equal, round, and reactive to light.   Neck: Trachea normal and phonation normal. Neck supple.   Cardiovascular: Normal rate, regular rhythm and normal heart sounds.   Pulmonary/Chest: Effort normal and breath sounds normal. No stridor. No respiratory distress.   Musculoskeletal: Normal range of motion.         General: Tenderness present. No signs of injury. Normal range of motion.      Comments: EXAMINATION OF THE RIGHT ELBOW SHOWS TENDERNESS TO PALPATION OVER THE LATERAL EPICONDYLE AND PROXIMAL FOREARM MUSCLES DORSALLY AND LATERALLY.  CONSISTENT WITH LATERAL EPICONDYLITIS AND PROXIMAL FOREARM STRAIN.  DISTALLY NEUROVASCULARLY INTACT.  NO EDEMA NO ECCHYMOSIS.   Neurological: She is alert and oriented to person, place, and time.   Skin: Skin is warm, dry, intact and no rash. Capillary refill takes less than 2 seconds. No abrasion, No burn, No bruising, No erythema and No ecchymosis   Psychiatric:  Her speech is normal and behavior is normal. Judgment and thought content normal.   Nursing note and vitals reviewed.      Assessment:     1. Lateral epicondylitis of right elbow        Plan:       Lateral epicondylitis of right elbow    Other orders  -     methylPREDNISolone (MEDROL DOSEPACK) 4 mg tablet; use as directed  Dispense: 21 each; Refill: 0        Patient Instructions   SEE LATERAL EPICONDYLITIS SHEET   MEDROL DOSEPAK PRESCRIPTION  ICE THE SORE AREA FOR 10-15 MINUTES 3 TIMES PER DAY FOR 1 WEEK   LIMIT THE USE OF THE RIGHT UPPER EXTREMITY AT WORK   [facility]   FOLLOW-UP WITH ORTHOPEDICS IF NOT VERY MUCH IMPROVED IN 1-2 WEEKS   RETURN FOR ANY CONCERNS OR PROBLEMS.

## 2023-09-11 NOTE — PATIENT INSTRUCTIONS
SEE LATERAL EPICONDYLITIS SHEET   MEDROL DOSEPAK PRESCRIPTION  ICE THE SORE AREA FOR 10-15 MINUTES 3 TIMES PER DAY FOR 1 WEEK   LIMIT THE USE OF THE RIGHT UPPER EXTREMITY AT WORK   [facility]   FOLLOW-UP WITH ORTHOPEDICS IF NOT VERY MUCH IMPROVED IN 1-2 WEEKS   RETURN FOR ANY CONCERNS OR PROBLEMS.

## 2023-09-18 NOTE — PROGRESS NOTES
[facility] Clinic Note    Chief Complaint      Chief Complaint   Patient presents with    Annual Exam       History of Present Illness      Francis Stafford is a 42 y.o. female who presents today for   Chief Complaint   Patient presents with    Annual Exam         Patient presents to clinic today for annual wellness visit.  She reports feeling well. She exercises during the week by walking and/or rock climbing at an indoor facility. No complaints at this time.         Review of Systems   All 12 systems otherwise negative.       Family History:  family history includes Breast cancer in her maternal grandmother; Cancer in her maternal grandmother; Heart disease in her maternal grandfather; No Known Problems in her brother, brother, father, mother, paternal grandfather, paternal grandmother, and sister.   Family history was reviewed with patient.     Medications:  Outpatient Encounter Medications as of 9/28/2023   Medication Sig Dispense Refill    [DISCONTINUED] venlafaxine (EFFEXOR-XR) 150 MG Cp24 Take 1 capsule (150 mg total) by mouth once daily. 90 capsule 3    ergocalciferol, vitamin D2, (VITAMIN D ORAL) Take 2,000 Units by mouth Daily.      venlafaxine (EFFEXOR-XR) 150 MG Cp24 Take 1 capsule (150 mg total) by mouth once daily. 90 capsule 3    [DISCONTINUED] ARIPiprazole (ABILIFY) 5 MG Tab Take 1 tablet (5 mg total) by mouth once daily. 90 tablet 3    [DISCONTINUED] methylPREDNISolone (MEDROL DOSEPACK) 4 mg tablet use as directed (Patient not taking: Reported on 9/28/2023) 21 each 0     No facility-administered encounter medications on file as of 9/28/2023.       Allergies:  Review of patient's allergies indicates:  No Known Allergies    Health Maintenance:  Health Maintenance   Topic Date Due    Mammogram  03/02/2024    TETANUS VACCINE  05/31/2028    Hepatitis C Screening  Completed    Lipid Panel  Completed     Health Maintenance Topics with due status: Not Due       Topic Last Completion Date    TETANUS  "VACCINE 05/31/2018    Cervical Cancer Screening 03/08/2021    Hemoglobin A1c (Prediabetes) 01/03/2023    Mammogram 03/02/2023       Physical Exam      Vital Signs  Temp: 98.2 °F (36.8 °C)  Pulse: 72  SpO2: 98 %  BP: 108/78  Pain Score:   4  Pain Loc: Elbow  Height and Weight  Height: 5' 6" (167.6 cm)  Weight: 97 kg (213 lb 12.8 oz)  BSA (Calculated - sq m): 2.12 sq meters  BMI (Calculated): 34.5  Weight in (lb) to have BMI = 25: 154.6]    Physical Exam  Vitals reviewed.   Constitutional:       Appearance: Normal appearance. She is normal weight.   HENT:      Head: Normocephalic and atraumatic.      Nose: Nose normal.      Mouth/Throat:      Mouth: Mucous membranes are moist.      Pharynx: Oropharynx is clear.   Eyes:      Extraocular Movements: Extraocular movements intact.      Conjunctiva/sclera: Conjunctivae normal.      Pupils: Pupils are equal, round, and reactive to light.   Cardiovascular:      Rate and Rhythm: Normal rate and regular rhythm.   Pulmonary:      Effort: Pulmonary effort is normal.      Breath sounds: Normal breath sounds.   Musculoskeletal:         General: Normal range of motion.      Cervical back: Normal range of motion and neck supple.   Skin:     General: Skin is warm and dry.      Capillary Refill: Capillary refill takes less than 2 seconds.   Neurological:      General: No focal deficit present.      Mental Status: She is alert and oriented to person, place, and time. Mental status is at baseline.   Psychiatric:         Mood and Affect: Mood normal.         Behavior: Behavior normal.         Thought Content: Thought content normal.         Judgment: Judgment normal.            Assessment/Plan     Francis Stafford is a 42 y.o.female with:    Annual physical exam  -     CBC W/ AUTO DIFFERENTIAL; Future; Expected date: 09/28/2023  -     COMPREHENSIVE METABOLIC PANEL; Future; Expected date: 09/28/2023  -     TSH; Future; Expected date: 09/28/2023  -     T4, FREE; Future; Expected date: " 09/28/2023  -     HEMOGLOBIN A1C; Future; Expected date: 09/28/2023    Mixed hyperlipidemia  -     LIPID PANEL; Future; Expected date: 09/28/2023    Other orders  -     venlafaxine (EFFEXOR-XR) 150 MG Cp24; Take 1 capsule (150 mg total) by mouth once daily.  Dispense: 90 capsule; Refill: 3        As above, continue current medications and maintain follow up with specialists.  Return to clinic as needed.    Greater than 50% of visit was spent face to face with patient.  All questions were answered to patient's satisfaction.          Noemy Huff, NP-C  [facility]

## 2023-09-28 ENCOUNTER — OFFICE VISIT (OUTPATIENT)
Dept: PRIMARY CARE CLINIC | Facility: CLINIC | Age: 43
End: 2023-09-28
Payer: COMMERCIAL

## 2023-09-28 ENCOUNTER — PATIENT MESSAGE (OUTPATIENT)
Dept: PRIMARY CARE CLINIC | Facility: CLINIC | Age: 43
End: 2023-09-28

## 2023-09-28 ENCOUNTER — LAB VISIT (OUTPATIENT)
Dept: LAB | Facility: HOSPITAL | Age: 43
End: 2023-09-28
Attending: NURSE PRACTITIONER
Payer: COMMERCIAL

## 2023-09-28 VITALS
DIASTOLIC BLOOD PRESSURE: 78 MMHG | WEIGHT: 213.81 LBS | TEMPERATURE: 98 F | HEIGHT: 66 IN | BODY MASS INDEX: 34.36 KG/M2 | HEART RATE: 72 BPM | OXYGEN SATURATION: 98 % | SYSTOLIC BLOOD PRESSURE: 108 MMHG

## 2023-09-28 DIAGNOSIS — E78.2 MIXED HYPERLIPIDEMIA: ICD-10-CM

## 2023-09-28 DIAGNOSIS — Z00.00 ANNUAL PHYSICAL EXAM: Primary | ICD-10-CM

## 2023-09-28 DIAGNOSIS — Z00.00 ANNUAL PHYSICAL EXAM: ICD-10-CM

## 2023-09-28 DIAGNOSIS — E78.00 HYPERCHOLESTEROLEMIA: Primary | ICD-10-CM

## 2023-09-28 LAB
ALBUMIN SERPL BCP-MCNC: 3.5 G/DL (ref 3.5–5.2)
ALP SERPL-CCNC: 75 U/L (ref 55–135)
ALT SERPL W/O P-5'-P-CCNC: 30 U/L (ref 10–44)
ANION GAP SERPL CALC-SCNC: 5 MMOL/L (ref 8–16)
AST SERPL-CCNC: 22 U/L (ref 10–40)
BASOPHILS # BLD AUTO: 0.02 K/UL (ref 0–0.2)
BASOPHILS NFR BLD: 0.2 % (ref 0–1.9)
BILIRUB SERPL-MCNC: 0.5 MG/DL (ref 0.1–1)
BUN SERPL-MCNC: 10 MG/DL (ref 6–20)
CALCIUM SERPL-MCNC: 8.9 MG/DL (ref 8.7–10.5)
CHLORIDE SERPL-SCNC: 106 MMOL/L (ref 95–110)
CHOLEST SERPL-MCNC: 258 MG/DL (ref 120–199)
CHOLEST/HDLC SERPL: 4.6 {RATIO} (ref 2–5)
CO2 SERPL-SCNC: 27 MMOL/L (ref 23–29)
CREAT SERPL-MCNC: 0.8 MG/DL (ref 0.5–1.4)
DIFFERENTIAL METHOD: ABNORMAL
EOSINOPHIL # BLD AUTO: 0.1 K/UL (ref 0–0.5)
EOSINOPHIL NFR BLD: 1.4 % (ref 0–8)
ERYTHROCYTE [DISTWIDTH] IN BLOOD BY AUTOMATED COUNT: 12.6 % (ref 11.5–14.5)
EST. GFR  (NO RACE VARIABLE): >60 ML/MIN/1.73 M^2
ESTIMATED AVG GLUCOSE: 114 MG/DL (ref 68–131)
GLUCOSE SERPL-MCNC: 95 MG/DL (ref 70–110)
HBA1C MFR BLD: 5.6 % (ref 4–5.6)
HCT VFR BLD AUTO: 36.7 % (ref 37–48.5)
HDLC SERPL-MCNC: 56 MG/DL (ref 40–75)
HDLC SERPL: 21.7 % (ref 20–50)
HGB BLD-MCNC: 11.6 G/DL (ref 12–16)
IMM GRANULOCYTES # BLD AUTO: 0.01 K/UL (ref 0–0.04)
IMM GRANULOCYTES NFR BLD AUTO: 0.1 % (ref 0–0.5)
LDLC SERPL CALC-MCNC: 177.2 MG/DL (ref 63–159)
LYMPHOCYTES # BLD AUTO: 2.4 K/UL (ref 1–4.8)
LYMPHOCYTES NFR BLD: 29.5 % (ref 18–48)
MCH RBC QN AUTO: 29.2 PG (ref 27–31)
MCHC RBC AUTO-ENTMCNC: 31.6 G/DL (ref 32–36)
MCV RBC AUTO: 92 FL (ref 82–98)
MONOCYTES # BLD AUTO: 0.6 K/UL (ref 0.3–1)
MONOCYTES NFR BLD: 6.6 % (ref 4–15)
NEUTROPHILS # BLD AUTO: 5.1 K/UL (ref 1.8–7.7)
NEUTROPHILS NFR BLD: 62.2 % (ref 38–73)
NONHDLC SERPL-MCNC: 202 MG/DL
NRBC BLD-RTO: 0 /100 WBC
PLATELET # BLD AUTO: 247 K/UL (ref 150–450)
PMV BLD AUTO: 12.3 FL (ref 9.2–12.9)
POTASSIUM SERPL-SCNC: 4.1 MMOL/L (ref 3.5–5.1)
PROT SERPL-MCNC: 7.1 G/DL (ref 6–8.4)
RBC # BLD AUTO: 3.97 M/UL (ref 4–5.4)
SODIUM SERPL-SCNC: 138 MMOL/L (ref 136–145)
T4 FREE SERPL-MCNC: 0.71 NG/DL (ref 0.71–1.51)
TRIGL SERPL-MCNC: 124 MG/DL (ref 30–150)
TSH SERPL DL<=0.005 MIU/L-ACNC: 0.83 UIU/ML (ref 0.4–4)
WBC # BLD AUTO: 8.28 K/UL (ref 3.9–12.7)

## 2023-09-28 PROCEDURE — 3008F PR BODY MASS INDEX (BMI) DOCUMENTED: ICD-10-PCS | Mod: CPTII,S$GLB,, | Performed by: NURSE PRACTITIONER

## 2023-09-28 PROCEDURE — 1159F MED LIST DOCD IN RCRD: CPT | Mod: CPTII,S$GLB,, | Performed by: NURSE PRACTITIONER

## 2023-09-28 PROCEDURE — 1159F PR MEDICATION LIST DOCUMENTED IN MEDICAL RECORD: ICD-10-PCS | Mod: CPTII,S$GLB,, | Performed by: NURSE PRACTITIONER

## 2023-09-28 PROCEDURE — 3074F PR MOST RECENT SYSTOLIC BLOOD PRESSURE < 130 MM HG: ICD-10-PCS | Mod: CPTII,S$GLB,, | Performed by: NURSE PRACTITIONER

## 2023-09-28 PROCEDURE — 85025 COMPLETE CBC W/AUTO DIFF WBC: CPT | Performed by: NURSE PRACTITIONER

## 2023-09-28 PROCEDURE — 1160F PR REVIEW ALL MEDS BY PRESCRIBER/CLIN PHARMACIST DOCUMENTED: ICD-10-PCS | Mod: CPTII,S$GLB,, | Performed by: NURSE PRACTITIONER

## 2023-09-28 PROCEDURE — 80061 LIPID PANEL: CPT | Performed by: NURSE PRACTITIONER

## 2023-09-28 PROCEDURE — 3008F BODY MASS INDEX DOCD: CPT | Mod: CPTII,S$GLB,, | Performed by: NURSE PRACTITIONER

## 2023-09-28 PROCEDURE — 1160F RVW MEDS BY RX/DR IN RCRD: CPT | Mod: CPTII,S$GLB,, | Performed by: NURSE PRACTITIONER

## 2023-09-28 PROCEDURE — 83036 HEMOGLOBIN GLYCOSYLATED A1C: CPT | Performed by: NURSE PRACTITIONER

## 2023-09-28 PROCEDURE — 3078F PR MOST RECENT DIASTOLIC BLOOD PRESSURE < 80 MM HG: ICD-10-PCS | Mod: CPTII,S$GLB,, | Performed by: NURSE PRACTITIONER

## 2023-09-28 PROCEDURE — 84443 ASSAY THYROID STIM HORMONE: CPT | Performed by: NURSE PRACTITIONER

## 2023-09-28 PROCEDURE — 99396 PREV VISIT EST AGE 40-64: CPT | Mod: S$GLB,,, | Performed by: NURSE PRACTITIONER

## 2023-09-28 PROCEDURE — 99999 PR PBB SHADOW E&M-EST. PATIENT-LVL IV: ICD-10-PCS | Mod: PBBFAC,,, | Performed by: NURSE PRACTITIONER

## 2023-09-28 PROCEDURE — 80053 COMPREHEN METABOLIC PANEL: CPT | Performed by: NURSE PRACTITIONER

## 2023-09-28 PROCEDURE — 3074F SYST BP LT 130 MM HG: CPT | Mod: CPTII,S$GLB,, | Performed by: NURSE PRACTITIONER

## 2023-09-28 PROCEDURE — 36415 COLL VENOUS BLD VENIPUNCTURE: CPT | Mod: PN | Performed by: NURSE PRACTITIONER

## 2023-09-28 PROCEDURE — 3044F PR MOST RECENT HEMOGLOBIN A1C LEVEL <7.0%: ICD-10-PCS | Mod: CPTII,S$GLB,, | Performed by: NURSE PRACTITIONER

## 2023-09-28 PROCEDURE — 99396 PR PREVENTIVE VISIT,EST,40-64: ICD-10-PCS | Mod: S$GLB,,, | Performed by: NURSE PRACTITIONER

## 2023-09-28 PROCEDURE — 3044F HG A1C LEVEL LT 7.0%: CPT | Mod: CPTII,S$GLB,, | Performed by: NURSE PRACTITIONER

## 2023-09-28 PROCEDURE — 84439 ASSAY OF FREE THYROXINE: CPT | Performed by: NURSE PRACTITIONER

## 2023-09-28 PROCEDURE — 3078F DIAST BP <80 MM HG: CPT | Mod: CPTII,S$GLB,, | Performed by: NURSE PRACTITIONER

## 2023-09-28 PROCEDURE — 99999 PR PBB SHADOW E&M-EST. PATIENT-LVL IV: CPT | Mod: PBBFAC,,, | Performed by: NURSE PRACTITIONER

## 2023-09-28 RX ORDER — VENLAFAXINE HYDROCHLORIDE 150 MG/1
150 CAPSULE, EXTENDED RELEASE ORAL DAILY
Qty: 90 CAPSULE | Refills: 3 | Status: SHIPPED | OUTPATIENT
Start: 2023-09-28 | End: 2024-09-22

## 2023-09-29 RX ORDER — ROSUVASTATIN CALCIUM 20 MG/1
20 TABLET, COATED ORAL DAILY
Qty: 90 TABLET | Refills: 3 | Status: SHIPPED | OUTPATIENT
Start: 2023-09-29 | End: 2024-09-28

## 2023-12-05 RX ORDER — VENLAFAXINE HYDROCHLORIDE 150 MG/1
150 CAPSULE, EXTENDED RELEASE ORAL
Qty: 90 CAPSULE | Refills: 3 | OUTPATIENT
Start: 2023-12-05

## 2024-03-05 ENCOUNTER — HOSPITAL ENCOUNTER (OUTPATIENT)
Dept: RADIOLOGY | Facility: OTHER | Age: 44
Discharge: HOME | End: 2024-03-05
Attending: NURSE PRACTITIONER
Payer: COMMERCIAL

## 2024-03-05 DIAGNOSIS — Z12.31 ENCOUNTER FOR SCREENING MAMMOGRAM FOR BREAST CANCER: ICD-10-CM

## 2024-03-05 PROCEDURE — 77067 SCR MAMMO BI INCL CAD: CPT | Mod: TC

## 2024-03-05 PROCEDURE — 77063 BREAST TOMOSYNTHESIS BI: CPT | Mod: 26,,, | Performed by: RADIOLOGY

## 2024-03-05 PROCEDURE — 77067 SCR MAMMO BI INCL CAD: CPT | Mod: 26,,, | Performed by: RADIOLOGY

## 2024-07-24 ENCOUNTER — PATIENT MESSAGE (OUTPATIENT)
Dept: PRIMARY CARE CLINIC | Facility: CLINIC | Age: 44
End: 2024-07-24
Payer: COMMERCIAL

## 2024-07-25 NOTE — PROGRESS NOTES
[facility] Note    Chief Complaint      Chief Complaint   Patient presents with    Itching     Scar on abd itching.    Numbness     Middle finger feels tight, tingly, numb X 1 week       History of Present Illness      Francis Stafford is a 43 y.o. female who presents today for   Chief Complaint   Patient presents with    Itching     Scar on abd itching.    Numbness     Middle finger feels tight, tingly, numb X 1 week         Patient is known to me.  She presents to clinic today to discuss symptoms of numb feeling to left middle finger.  She denies any injury or trauma to this hand or finger.  She has equal strength bilaterally in both hands.  There are no other complaints at this time.  She is feeling well otherwise.         Review of Systems   Neurological: Negative.         +numbness in left middle finger   All 12 systems otherwise negative.       Family History:  family history includes Breast cancer in her maternal grandmother; Cancer in her maternal grandmother; Heart disease in her maternal grandfather; No Known Problems in her brother, brother, father, mother, paternal grandfather, paternal grandmother, and sister.   Family history was reviewed with patient.     Medications:  Outpatient Encounter Medications as of 7/26/2024   Medication Sig Dispense Refill    ergocalciferol, vitamin D2, (VITAMIN D ORAL) Take 2,000 Units by mouth Daily.      venlafaxine (EFFEXOR-XR) 150 MG Cp24 Take 1 capsule (150 mg total) by mouth once daily. 90 capsule 3    [DISCONTINUED] rosuvastatin (CRESTOR) 20 MG tablet Take 1 tablet (20 mg total) by mouth once daily. 90 tablet 3    rosuvastatin (CRESTOR) 20 MG tablet Take 1 tablet (20 mg total) by mouth once daily. 90 tablet 3     No facility-administered encounter medications on file as of 7/26/2024.       Allergies:  Review of patient's allergies indicates:  No Known Allergies    Health Maintenance:  Health Maintenance   Topic Date Due    Mammogram  03/05/2025     "TETANUS VACCINE  05/31/2028    Hepatitis C Screening  Completed    Lipid Panel  Completed     Health Maintenance Topics with due status: Not Due       Topic Last Completion Date    TETANUS VACCINE 05/31/2018    Cervical Cancer Screening 03/08/2021    Hemoglobin A1c (Prediabetes) 09/28/2023    Mammogram 03/05/2024       Physical Exam      Vital Signs  Pulse: 92  SpO2: 97 %  BP: 126/70  BP Location: Right arm  Patient Position: Sitting  Pain Score: 0-No pain  Height and Weight  Height: 5' 6" (167.6 cm)  Weight: 100 kg (220 lb 7.4 oz)  BSA (Calculated - sq m): 2.16 sq meters  BMI (Calculated): 35.6  Weight in (lb) to have BMI = 25: 154.6]    Physical Exam  Vitals reviewed.   Constitutional:       Appearance: Normal appearance. She is normal weight.   HENT:      Head: Normocephalic and atraumatic.      Nose: Nose normal.      Mouth/Throat:      Mouth: Mucous membranes are moist.      Pharynx: Oropharynx is clear.   Eyes:      Extraocular Movements: Extraocular movements intact.      Conjunctiva/sclera: Conjunctivae normal.      Pupils: Pupils are equal, round, and reactive to light.   Cardiovascular:      Rate and Rhythm: Normal rate and regular rhythm.      Pulses: Normal pulses.      Heart sounds: Normal heart sounds.   Pulmonary:      Effort: Pulmonary effort is normal.      Breath sounds: Normal breath sounds.   Musculoskeletal:         General: Normal range of motion.      Cervical back: Normal range of motion and neck supple.   Skin:     General: Skin is warm and dry.      Capillary Refill: Capillary refill takes less than 2 seconds.   Neurological:      General: No focal deficit present.      Mental Status: She is alert and oriented to person, place, and time. Mental status is at baseline.   Psychiatric:         Mood and Affect: Mood normal.         Behavior: Behavior normal.         Thought Content: Thought content normal.         Judgment: Judgment normal.            Assessment/Plan     Francis Stafford is a 43 " y.o.female with:    Hand numbness  -     Ambulatory referral/consult to Hand Surgery; Future; Expected date: 08/02/2024    Hypercholesterolemia  -     rosuvastatin (CRESTOR) 20 MG tablet; Take 1 tablet (20 mg total) by mouth once daily.  Dispense: 90 tablet; Refill: 3    Routine medical exam  -     rosuvastatin (CRESTOR) 20 MG tablet; Take 1 tablet (20 mg total) by mouth once daily.  Dispense: 90 tablet; Refill: 3  -     Lipid Panel; Future; Expected date: 08/26/2024  -     CBC Auto Differential; Future; Expected date: 08/26/2024  -     Comprehensive Metabolic Panel; Future; Expected date: 08/26/2024  -     Hemoglobin A1C; Future; Expected date: 08/26/2024  -     T4, Free; Future; Expected date: 08/26/2024  -     TSH; Future; Expected date: 08/26/2024        As above, continue current medications and maintain follow up with specialists.  Return to clinic as needed.    Greater than 50% of visit was spent face to face with patient.  All questions were answered to patient's satisfaction.          Karen L Spencer, NP-C [facility]

## 2024-07-26 ENCOUNTER — OFFICE VISIT (OUTPATIENT)
Dept: PRIMARY CARE CLINIC | Facility: CLINIC | Age: 44
End: 2024-07-26
Payer: COMMERCIAL

## 2024-07-26 VITALS
BODY MASS INDEX: 35.43 KG/M2 | WEIGHT: 220.44 LBS | HEART RATE: 92 BPM | DIASTOLIC BLOOD PRESSURE: 70 MMHG | OXYGEN SATURATION: 97 % | SYSTOLIC BLOOD PRESSURE: 126 MMHG | HEIGHT: 66 IN

## 2024-07-26 DIAGNOSIS — R20.0 HAND NUMBNESS: Primary | ICD-10-CM

## 2024-07-26 DIAGNOSIS — E78.00 HYPERCHOLESTEROLEMIA: ICD-10-CM

## 2024-07-26 DIAGNOSIS — Z00.00 ROUTINE MEDICAL EXAM: ICD-10-CM

## 2024-07-26 PROCEDURE — 99999 PR PBB SHADOW E&M-EST. PATIENT-LVL IV: CPT | Mod: PBBFAC,,, | Performed by: NURSE PRACTITIONER

## 2024-07-26 RX ORDER — ROSUVASTATIN CALCIUM 20 MG/1
20 TABLET, COATED ORAL DAILY
Qty: 90 TABLET | Refills: 3 | Status: SHIPPED | OUTPATIENT
Start: 2024-07-26 | End: 2025-07-26

## 2024-07-30 DIAGNOSIS — R52 PAIN: Primary | ICD-10-CM

## 2024-08-06 ENCOUNTER — HOSPITAL ENCOUNTER (OUTPATIENT)
Dept: RADIOLOGY | Facility: OTHER | Age: 44
Discharge: HOME | End: 2024-08-06
Attending: SPECIALIST/TECHNOLOGIST
Payer: COMMERCIAL

## 2024-08-06 ENCOUNTER — OFFICE VISIT (OUTPATIENT)
Dept: ORTHOPEDICS | Facility: CLINIC | Age: 44
End: 2024-08-06
Payer: COMMERCIAL

## 2024-08-06 VITALS — WEIGHT: 220.44 LBS | BODY MASS INDEX: 35.43 KG/M2 | HEIGHT: 66 IN

## 2024-08-06 DIAGNOSIS — G56.03 BILATERAL CARPAL TUNNEL SYNDROME: Primary | ICD-10-CM

## 2024-08-06 DIAGNOSIS — R52 PAIN: ICD-10-CM

## 2024-08-06 DIAGNOSIS — R20.0 HAND NUMBNESS: ICD-10-CM

## 2024-08-06 PROCEDURE — 73130 X-RAY EXAM OF HAND: CPT | Mod: TC,FY,LT

## 2024-08-06 PROCEDURE — 99999 PR PBB SHADOW E&M-EST. PATIENT-LVL III: CPT | Mod: PBBFAC,,, | Performed by: SPECIALIST/TECHNOLOGIST

## 2024-08-06 PROCEDURE — 99204 OFFICE O/P NEW MOD 45 MIN: CPT | Mod: S$GLB,,, | Performed by: SPECIALIST/TECHNOLOGIST

## 2024-08-06 PROCEDURE — 1159F MED LIST DOCD IN RCRD: CPT | Mod: CPTII,S$GLB,, | Performed by: SPECIALIST/TECHNOLOGIST

## 2024-08-06 PROCEDURE — 3008F BODY MASS INDEX DOCD: CPT | Mod: CPTII,S$GLB,, | Performed by: SPECIALIST/TECHNOLOGIST

## 2024-08-06 PROCEDURE — 73130 X-RAY EXAM OF HAND: CPT | Mod: 26,LT,, | Performed by: RADIOLOGY

## 2024-09-05 NOTE — PROGRESS NOTES
[facility] Note    Chief Complaint      Chief Complaint   Patient presents with    Annual Exam       History of Present Illness      Francis Stafford is a 43 y.o. female who presents today for   Chief Complaint   Patient presents with    Annual Exam         Patient is known to me.  She presents to clinic today for her routine medical exam.  She reports feeling well today.  There are no complaints today.           Review of Systems   All 12 systems otherwise negative.       Family History:  family history includes Breast cancer in her maternal grandmother; Cancer in her maternal grandmother; Heart disease in her maternal grandfather; No Known Problems in her brother, brother, father, mother, paternal grandfather, paternal grandmother, and sister.   Family history was reviewed with patient.     Medications:  Outpatient Encounter Medications as of 9/26/2024   Medication Sig Dispense Refill    ergocalciferol, vitamin D2, (VITAMIN D ORAL) Take 2,000 Units by mouth Daily.      rosuvastatin (CRESTOR) 20 MG tablet Take 1 tablet (20 mg total) by mouth once daily. 90 tablet 3    [DISCONTINUED] venlafaxine (EFFEXOR-XR) 150 MG Cp24 TAKE 1 CAPSULE(150 MG) BY MOUTH EVERY DAY 90 capsule 3    venlafaxine (EFFEXOR-XR) 150 MG Cp24 Take 1 capsule (150 mg total) by mouth once daily. 90 capsule 3    venlafaxine (EFFEXOR-XR) 75 MG 24 hr capsule Take 1 capsule (75 mg total) by mouth once daily. 30 capsule 11    [DISCONTINUED] venlafaxine (EFFEXOR-XR) 150 MG Cp24 Take 1 capsule (150 mg total) by mouth once daily. 90 capsule 3     No facility-administered encounter medications on file as of 9/26/2024.       Allergies:  Review of patient's allergies indicates:  No Known Allergies    Health Maintenance:  Health Maintenance   Topic Date Due    Mammogram  03/05/2025    TETANUS VACCINE  05/31/2028    Hepatitis C Screening  Completed    Lipid Panel  Completed     Health Maintenance Topics with due status: Not Due       Topic Last  "Completion Date    TETANUS VACCINE 05/31/2018    Cervical Cancer Screening 03/08/2021    Mammogram 03/05/2024    Hemoglobin A1c (Prediabetes) 09/13/2024       Physical Exam      Vital Signs  Pulse: 72  SpO2: 96 %  BP: 112/82  BP Location: Right arm  Patient Position: Sitting  Pain Score: 0-No pain  Height and Weight  Height: 5' 6" (167.6 cm)  Weight: 99.8 kg (220 lb 0.3 oz)  BSA (Calculated - sq m): 2.16 sq meters  BMI (Calculated): 35.5  Weight in (lb) to have BMI = 25: 154.6]    Physical Exam  Vitals reviewed.   Constitutional:       Appearance: Normal appearance. She is normal weight.   HENT:      Head: Normocephalic and atraumatic.      Right Ear: Tympanic membrane, ear canal and external ear normal.      Left Ear: Tympanic membrane, ear canal and external ear normal.      Nose: Nose normal.      Mouth/Throat:      Mouth: Mucous membranes are moist.      Pharynx: Oropharynx is clear.   Eyes:      Extraocular Movements: Extraocular movements intact.      Conjunctiva/sclera: Conjunctivae normal.      Pupils: Pupils are equal, round, and reactive to light.   Cardiovascular:      Rate and Rhythm: Normal rate and regular rhythm.      Pulses: Normal pulses.      Heart sounds: Normal heart sounds.   Pulmonary:      Effort: Pulmonary effort is normal.      Breath sounds: Normal breath sounds.   Abdominal:      General: Abdomen is flat. Bowel sounds are normal.      Palpations: Abdomen is soft.   Musculoskeletal:         General: Normal range of motion.      Cervical back: Normal range of motion and neck supple.   Skin:     General: Skin is warm and dry.      Capillary Refill: Capillary refill takes less than 2 seconds.   Neurological:      General: No focal deficit present.      Mental Status: She is alert and oriented to person, place, and time. Mental status is at baseline.   Psychiatric:         Mood and Affect: Mood normal.         Behavior: Behavior normal.         Thought Content: Thought content normal.         " Judgment: Judgment normal.            Assessment/Plan     Francis Stafford is a 43 y.o.female with:    Anxiety and depression  -     venlafaxine (EFFEXOR-XR) 75 MG 24 hr capsule; Take 1 capsule (75 mg total) by mouth once daily.  Dispense: 30 capsule; Refill: 11    Excessive cerumen in both ear canals  -     Ambulatory referral/consult to ENT; Future; Expected date: 10/03/2024    Other orders  -     venlafaxine (EFFEXOR-XR) 150 MG Cp24; Take 1 capsule (150 mg total) by mouth once daily.  Dispense: 90 capsule; Refill: 3        As above, continue current medications and maintain follow up with specialists.  Return to clinic as needed.  Reviewed lab results from 09/13/2024.  All questions answered to patient's satisfaction.  Greater than 50% of visit was spent face to face with patient.  All questions were answered to patient's satisfaction.          Noemy Huff, NP-C  [facility]

## 2024-09-09 RX ORDER — VENLAFAXINE HYDROCHLORIDE 150 MG/1
150 CAPSULE, EXTENDED RELEASE ORAL
Qty: 90 CAPSULE | Refills: 3 | Status: SHIPPED | OUTPATIENT
Start: 2024-09-09

## 2024-09-13 ENCOUNTER — LAB VISIT (OUTPATIENT)
Dept: LAB | Facility: HOSPITAL | Age: 44
End: 2024-09-13
Attending: NURSE PRACTITIONER
Payer: COMMERCIAL

## 2024-09-13 DIAGNOSIS — Z00.00 ROUTINE MEDICAL EXAM: ICD-10-CM

## 2024-09-13 LAB
ALBUMIN SERPL BCP-MCNC: 4.2 G/DL (ref 3.5–5.2)
ALP SERPL-CCNC: 74 U/L (ref 55–135)
ALT SERPL W/O P-5'-P-CCNC: 21 U/L (ref 10–44)
ANION GAP SERPL CALC-SCNC: 9 MMOL/L (ref 8–16)
AST SERPL-CCNC: 27 U/L (ref 10–40)
BASOPHILS # BLD AUTO: 0.02 K/UL (ref 0–0.2)
BASOPHILS NFR BLD: 0.2 % (ref 0–1.9)
BILIRUB SERPL-MCNC: 0.2 MG/DL (ref 0.1–1)
BUN SERPL-MCNC: 10 MG/DL (ref 6–20)
CALCIUM SERPL-MCNC: 9.5 MG/DL (ref 8.7–10.5)
CHLORIDE SERPL-SCNC: 105 MMOL/L (ref 95–110)
CHOLEST SERPL-MCNC: 159 MG/DL (ref 120–199)
CHOLEST/HDLC SERPL: 2.4 {RATIO} (ref 2–5)
CO2 SERPL-SCNC: 24 MMOL/L (ref 23–29)
CREAT SERPL-MCNC: 0.8 MG/DL (ref 0.5–1.4)
DIFFERENTIAL METHOD BLD: NORMAL
EOSINOPHIL # BLD AUTO: 0 K/UL (ref 0–0.5)
EOSINOPHIL NFR BLD: 0.4 % (ref 0–8)
ERYTHROCYTE [DISTWIDTH] IN BLOOD BY AUTOMATED COUNT: 12.7 % (ref 11.5–14.5)
EST. GFR  (NO RACE VARIABLE): >60 ML/MIN/1.73 M^2
ESTIMATED AVG GLUCOSE: 114 MG/DL (ref 68–131)
GLUCOSE SERPL-MCNC: 79 MG/DL (ref 70–110)
HBA1C MFR BLD: 5.6 % (ref 4–5.6)
HCT VFR BLD AUTO: 37.3 % (ref 37–48.5)
HDLC SERPL-MCNC: 65 MG/DL (ref 40–75)
HDLC SERPL: 40.9 % (ref 20–50)
HGB BLD-MCNC: 12.2 G/DL (ref 12–16)
IMM GRANULOCYTES # BLD AUTO: 0.02 K/UL (ref 0–0.04)
IMM GRANULOCYTES NFR BLD AUTO: 0.2 % (ref 0–0.5)
LDLC SERPL CALC-MCNC: 73.8 MG/DL (ref 63–159)
LYMPHOCYTES # BLD AUTO: 2.8 K/UL (ref 1–4.8)
LYMPHOCYTES NFR BLD: 26 % (ref 18–48)
MCH RBC QN AUTO: 29 PG (ref 27–31)
MCHC RBC AUTO-ENTMCNC: 32.7 G/DL (ref 32–36)
MCV RBC AUTO: 89 FL (ref 82–98)
MONOCYTES # BLD AUTO: 0.7 K/UL (ref 0.3–1)
MONOCYTES NFR BLD: 6.3 % (ref 4–15)
NEUTROPHILS # BLD AUTO: 7.2 K/UL (ref 1.8–7.7)
NEUTROPHILS NFR BLD: 66.9 % (ref 38–73)
NONHDLC SERPL-MCNC: 94 MG/DL
NRBC BLD-RTO: 0 /100 WBC
PLATELET # BLD AUTO: 289 K/UL (ref 150–450)
PMV BLD AUTO: 12.2 FL (ref 9.2–12.9)
POTASSIUM SERPL-SCNC: 3.9 MMOL/L (ref 3.5–5.1)
PROT SERPL-MCNC: 7.9 G/DL (ref 6–8.4)
RBC # BLD AUTO: 4.21 M/UL (ref 4–5.4)
SODIUM SERPL-SCNC: 138 MMOL/L (ref 136–145)
T4 FREE SERPL-MCNC: 0.68 NG/DL (ref 0.71–1.51)
TRIGL SERPL-MCNC: 101 MG/DL (ref 30–150)
TSH SERPL DL<=0.005 MIU/L-ACNC: 1.89 UIU/ML (ref 0.4–4)
WBC # BLD AUTO: 10.7 K/UL (ref 3.9–12.7)

## 2024-09-13 PROCEDURE — 80061 LIPID PANEL: CPT | Performed by: NURSE PRACTITIONER

## 2024-09-13 PROCEDURE — 84439 ASSAY OF FREE THYROXINE: CPT | Performed by: NURSE PRACTITIONER

## 2024-09-13 PROCEDURE — 83036 HEMOGLOBIN GLYCOSYLATED A1C: CPT | Performed by: NURSE PRACTITIONER

## 2024-09-13 PROCEDURE — 80053 COMPREHEN METABOLIC PANEL: CPT | Performed by: NURSE PRACTITIONER

## 2024-09-13 PROCEDURE — 36415 COLL VENOUS BLD VENIPUNCTURE: CPT | Mod: PO | Performed by: NURSE PRACTITIONER

## 2024-09-13 PROCEDURE — 84443 ASSAY THYROID STIM HORMONE: CPT | Performed by: NURSE PRACTITIONER

## 2024-09-13 PROCEDURE — 85025 COMPLETE CBC W/AUTO DIFF WBC: CPT | Performed by: NURSE PRACTITIONER

## 2024-09-25 ENCOUNTER — PROCEDURE VISIT (OUTPATIENT)
Dept: NEUROLOGY | Facility: CLINIC | Age: 44
End: 2024-09-25
Payer: COMMERCIAL

## 2024-09-25 DIAGNOSIS — G56.03 BILATERAL CARPAL TUNNEL SYNDROME: ICD-10-CM

## 2024-09-25 PROCEDURE — 95886 MUSC TEST DONE W/N TEST COMP: CPT | Mod: S$GLB,,, | Performed by: PHYSICAL MEDICINE & REHABILITATION

## 2024-09-25 PROCEDURE — 95911 NRV CNDJ TEST 9-10 STUDIES: CPT | Mod: S$GLB,,, | Performed by: PHYSICAL MEDICINE & REHABILITATION

## 2024-09-25 NOTE — PROCEDURES
Test Date:  2024    Patient: michelle stafford : 1980 Physician: Cam Howell D.O.   ID#: [id number] SEX: Female Ref. Phys: William Shields PA-C     HPI: Michelle Stafford is a 43 y.o.female who presents for NCS/EMG to evaluate for CTS bilaterally.     PROCEDURE:  Prior to the procedure, the procedure was discussed in detail with the patient.  All questions were answered, and verbal consent was obtained.  For nerve conduction studies, a combination of surface electrodes, bar electrodes, and/or ring electrodes were used as needed.  For needle EMG, each site was cleaned and prepped in usual fashion with an alcohol pad.  A monopolar needle (28G) was used.  There was no significant bleeding, and bandages were applied as needed.  The procedure was tolerated without adverse effect.  The patient was instructed on post-procedure care including ice if needed for 10-15 minutes up to 4 times/day for any sore muscles.  I discussed with the patient that the data would be reviewed and a report sent to the referring provider, where any follow up questions regarding next steps should be directed.        NCV & EMG Findings:  All nerve conduction studies (as indicated in the following tables) were within normal limits.  All examined muscles (as indicated in the following table) showed no evidence of electrical instability.    IMPRESSIONS:  This is a normal electrophysiologic study of the bilateral upper extremities.          ___________________________  Cam Howell D.O.        NCS+  Motor Nerve Results      Latency Amplitude F-Lat Segment Distance CV Comment   Site (ms) Norm (mV) Norm (ms)  (cm) (m/s) Norm    Left Median (APB)   Wrist 3.4  < 4.4 5.1  > 4.2         Elbow 7.1 - 5.6 -  Elbow-Wrist 23 62  > 51    Right Median (APB)   Wrist 3.3  < 4.4 7.0  > 4.2         Elbow 7.2 - 6.2 -  Elbow-Wrist 23 59  > 51    Left Ulnar (ADM)   Wrist 2.8  < 3.7 8.4  > 3.0         Bel Elbow 6.3 - 6.7 -  Bel Elbow-Wrist 24 69  > 52     Abv Elbow 7.8 - 7.6 -  Abv Elbow-Bel Elbow 9 60  > 43    Right Ulnar (ADM)   Wrist 2.4  < 3.7 7.9  > 3.0         Bel Elbow 6.5 - 9.0 -  Bel Elbow-Wrist 26 63  > 52    Abv Elbow 7.1 - 9.4 -  Abv Elbow-Bel Elbow 7 117  > 43      Sensory Nerve Results      Start Lat Latency (Peak) Amplitude (P-P) Segment Distance Start CV Comment   Site (ms) Norm (ms) (µV) Norm  (cm) (m/s) Norm    Left Median   Wrist-Dig I 1.95 - 2.6 65 - Wrist-Dig I 10 51 -    Wrist-Dig II 2.5  < 3.3 3.2 57  > 13 Wrist-Dig II 14 56  > 42    Palm-Wrist 1.53 - 1.98 81 - Palm-Wrist - - -    Right Median   Wrist-Dig I 2.1 - 2.6 60 - Wrist-Dig I 10 48 -    Wrist-Dig II 2.8  < 3.3 3.4 49  > 13 Wrist-Dig II 14 50  > 42    Palm-Wrist 1.55 - 2.1 38 - Palm-Wrist - - -    Left Ulnar (Rec:Wrist)   Palm 1.43 - 2.1 10 - Palm-Wrist - - -    Right Ulnar (Rec:Wrist)   Palm 1.15 - 1.83 17 - Palm-Wrist - - -    Left Radial (Rec:Dig I)   Wrist 1.90 - 2.5 9 - Wrist-Dig I 10 53 -    Right Radial (Rec:Dig I)   Wrist 2.1 - 2.5 7 - Wrist-Dig I 10 48 -      Inter-Nerve Comparisons     Nerve 1 Value 1 Nerve 2 Value 2 Parameter Result Normal   Sensory Sites   R Median Wrist-Dig I 2.6 ms R Radial Wrist-Dig I 2.5 ms Peak Lat Diff 0.10 ms <0.40   L Median Wrist-Dig I 2.6 ms L Radial Wrist-Dig I 2.5 ms Peak Lat Diff 0.10 ms <0.40   R Median Palm-Wrist 2.1 ms R Ulnar Palm-Wrist 1.83 ms Peak Lat Diff 0.27 ms <0.30   L Median Palm-Wrist 1.98 ms L Ulnar Palm-Wrist 2.1 ms Peak Lat Diff 0.12 ms <0.30     EMG+     Side Muscle Nerve Root Ins Act Fibs Psw Amp Dur Poly Recrt Int Pat Comment   Left Deltoid Axillary C5-C6 Nml Nml Nml Nml Nml 0 Nml Nml    Left Biceps Musculocut C5-C6 Nml Nml Nml Nml Nml 0 Nml Nml    Left Triceps Radial C6-C8 Nml Nml Nml Nml Nml 0 Nml Nml    Left Pronator Teres Median C6-C7 Nml Nml Nml Nml Nml 0 Nml Nml    Left FDI Ulnar C8-T1 Nml Nml Nml Nml Nml 0 Nml Nml            Waveforms:    Motor              Sensory

## 2024-09-26 ENCOUNTER — OFFICE VISIT (OUTPATIENT)
Dept: PRIMARY CARE CLINIC | Facility: CLINIC | Age: 44
End: 2024-09-26
Payer: COMMERCIAL

## 2024-09-26 VITALS
SYSTOLIC BLOOD PRESSURE: 112 MMHG | BODY MASS INDEX: 35.36 KG/M2 | WEIGHT: 220 LBS | HEIGHT: 66 IN | HEART RATE: 72 BPM | OXYGEN SATURATION: 96 % | DIASTOLIC BLOOD PRESSURE: 82 MMHG

## 2024-09-26 DIAGNOSIS — F41.9 ANXIETY AND DEPRESSION: Primary | ICD-10-CM

## 2024-09-26 DIAGNOSIS — F32.A ANXIETY AND DEPRESSION: Primary | ICD-10-CM

## 2024-09-26 DIAGNOSIS — H61.23 EXCESSIVE CERUMEN IN BOTH EAR CANALS: ICD-10-CM

## 2024-09-26 PROBLEM — F41.1 GENERALIZED ANXIETY DISORDER WITH PANIC ATTACKS: Status: RESOLVED | Noted: 2020-04-21 | Resolved: 2024-09-26

## 2024-09-26 PROBLEM — F41.0 GENERALIZED ANXIETY DISORDER WITH PANIC ATTACKS: Status: RESOLVED | Noted: 2020-04-21 | Resolved: 2024-09-26

## 2024-09-26 PROBLEM — F33.1 MODERATE EPISODE OF RECURRENT MAJOR DEPRESSIVE DISORDER: Status: RESOLVED | Noted: 2020-04-21 | Resolved: 2024-09-26

## 2024-09-26 PROBLEM — G44.209 TENSION HEADACHE: Status: RESOLVED | Noted: 2018-06-04 | Resolved: 2024-09-26

## 2024-09-26 PROCEDURE — 99999 PR PBB SHADOW E&M-EST. PATIENT-LVL IV: CPT | Mod: PBBFAC,,, | Performed by: NURSE PRACTITIONER

## 2024-09-26 RX ORDER — VENLAFAXINE HYDROCHLORIDE 150 MG/1
150 CAPSULE, EXTENDED RELEASE ORAL DAILY
Qty: 90 CAPSULE | Refills: 3 | Status: SHIPPED | OUTPATIENT
Start: 2024-09-26

## 2024-09-26 RX ORDER — VENLAFAXINE HYDROCHLORIDE 75 MG/1
75 CAPSULE, EXTENDED RELEASE ORAL DAILY
Qty: 30 CAPSULE | Refills: 11 | Status: SHIPPED | OUTPATIENT
Start: 2024-09-26 | End: 2025-09-26

## 2024-10-14 ENCOUNTER — OFFICE VISIT (OUTPATIENT)
Dept: OTOLARYNGOLOGY | Facility: CLINIC | Age: 44
End: 2024-10-14
Payer: COMMERCIAL

## 2024-10-14 VITALS
SYSTOLIC BLOOD PRESSURE: 128 MMHG | BODY MASS INDEX: 34.72 KG/M2 | WEIGHT: 216.06 LBS | DIASTOLIC BLOOD PRESSURE: 88 MMHG | HEART RATE: 80 BPM | HEIGHT: 66 IN

## 2024-10-14 DIAGNOSIS — H61.23 EXCESSIVE CERUMEN IN BOTH EAR CANALS: ICD-10-CM

## 2024-10-14 DIAGNOSIS — H61.23 BILATERAL IMPACTED CERUMEN: Primary | ICD-10-CM

## 2024-10-14 PROCEDURE — 3008F BODY MASS INDEX DOCD: CPT | Mod: CPTII,S$GLB,, | Performed by: NURSE PRACTITIONER

## 2024-10-14 PROCEDURE — 99202 OFFICE O/P NEW SF 15 MIN: CPT | Mod: 25,S$GLB,, | Performed by: NURSE PRACTITIONER

## 2024-10-14 PROCEDURE — 3074F SYST BP LT 130 MM HG: CPT | Mod: CPTII,S$GLB,, | Performed by: NURSE PRACTITIONER

## 2024-10-14 PROCEDURE — 3079F DIAST BP 80-89 MM HG: CPT | Mod: CPTII,S$GLB,, | Performed by: NURSE PRACTITIONER

## 2024-10-14 PROCEDURE — 3044F HG A1C LEVEL LT 7.0%: CPT | Mod: CPTII,S$GLB,, | Performed by: NURSE PRACTITIONER

## 2024-10-14 PROCEDURE — 1159F MED LIST DOCD IN RCRD: CPT | Mod: CPTII,S$GLB,, | Performed by: NURSE PRACTITIONER

## 2024-10-14 PROCEDURE — 69210 REMOVE IMPACTED EAR WAX UNI: CPT | Mod: S$GLB,,, | Performed by: NURSE PRACTITIONER

## 2024-10-14 NOTE — PROGRESS NOTES
OTOLARYNGOLOGY CLINIC NOTE  Date:  10/14/2024     Chief complaint:  Chief Complaint   Patient presents with    Cerumen Impaction     History of Present Illness  Francis Stafford is a 43 y.o. female  presenting today for a new evaluation and treatment of cerumen impaction.  Pt denies any otalgia, otorrhea, pressure or fullness to her ears.  Pt denies using q-tips.      Past Medical History  Past Medical History:   Diagnosis Date    Anxiety     Depression     Generalized anxiety disorder with panic attacks 04/21/2020    Hx of psychiatric care     Moderate episode of recurrent major depressive disorder 04/21/2020    Psychiatric problem     Tension headache 06/04/2018    Therapy       Past Surgical History  Past Surgical History:   Procedure Laterality Date    LAPAROSCOPIC CHOLECYSTECTOMY N/A 03/23/2022    Procedure: CHOLECYSTECTOMY, LAPAROSCOPIC;  Surgeon: Cam Cisneros MD;  Location: [facility] OR [address] [place] [address];  Service: General;  Laterality: N/A;    WISDOM TOOTH EXTRACTION        Medications  Current Outpatient Medications on File Prior to Visit   Medication Sig Dispense Refill    ergocalciferol, vitamin D2, (VITAMIN D ORAL) Take 2,000 Units by mouth Daily.      rosuvastatin (CRESTOR) 20 MG tablet Take 1 tablet (20 mg total) by mouth once daily. 90 tablet 3    venlafaxine (EFFEXOR-XR) 150 MG Cp24 Take 1 capsule (150 mg total) by mouth once daily. 90 capsule 3    venlafaxine (EFFEXOR-XR) 75 MG 24 hr capsule Take 1 capsule (75 mg total) by mouth once daily. 30 capsule 11     No current facility-administered medications on file prior to visit.     Review of Systems  Review of Systems   Constitutional:  Positive for malaise/fatigue.   HENT: Negative.     Cardiovascular: Negative.    Gastrointestinal:  Positive for abdominal pain, constipation and vomiting.   Genitourinary: Negative.    Musculoskeletal:  Positive for back pain.   Skin: Negative.    Neurological: Negative.    Psychiatric/Behavioral:  Positive for depression. The  "patient is nervous/anxious.       Social History   reports that she quit smoking about 25 years ago. Her smoking use included cigarettes. She has never used smokeless tobacco. She reports current alcohol use of about 1.0 standard drink of alcohol per week. She reports that she does not use drugs.     Family History  Family History   Problem Relation Name Age of Onset    No Known Problems Mother      No Known Problems Father      No Known Problems Sister Qian     No Known Problems Brother Stevie     No Known Problems Brother Maldonado     Cancer Maternal Grandmother      Breast cancer Maternal Grandmother      Heart disease Maternal Grandfather      No Known Problems Paternal Grandmother      No Known Problems Paternal Grandfather        Physical Exam   Vitals:    10/14/24 1329   BP: 128/88   Pulse: 80    Body mass index is 34.87 kg/m².  Weight: 98 kg (216 lb 0.8 oz)   Height: 5' 6" (167.6 cm)     GENERAL: no acute distress.  HEAD: normocephalic.   EYES: lids and lashes normal. No scleral icterus  EARS: external ear without lesion, normal pinna shape and position.  External auditory canal with cerumen impaction bilaterally.   NOSE: external nose without significant bony abnormality  ORAL CAVITY/OROPHARYNX: tongue midline and mobile. Symmetric palate rise.   NECK: trachea midline.   LYMPH NODES:No cervical lymphadenopathy.  RESPIRATORY: no stridor, no stertor. Voice normal. Respirations nonlabored.  NEURO: alert, responds to questions appropriately.   PSYCH:mood appropriate    Procedure Note   Procedure performed:microscopic examination of ears with cerumen disimpaction    Indication for procedure: bilateral cerumen impaction     Description of procedure:  After verbal consent was obtained, the patient was positioned in semi recumbent position and speculum was placed in the right ear and microscope brought into the field.  The microscope was positioned and magnification adjusted for appropriate visualization. Otologic " instruments including various size otologic suctions and curette were used to remove the cerumen from the right external auditory canals under visualization with the operating microscope. After cleaning, visualization was again performed and at various levels of higher magnification to optimize views of the ear canal, tympanic membrane, ossicles and middle ear space. The same procedure was then repeated for the left ear. Findings as indicated below. All portions of the procedure and examination by otomicroscopy were tolerated well without complication.     Findings:  Right ear: Complete cerumen impaction removed entirely revealing normal external auditory canal; tympanic membrane without bulging, retraction, or perforation; no evidence of middle ear fluid or effusion.   Left ear: Complete cerumen impaction removed entirely revealing normal external auditory canal; tympanic membrane without bulging, retraction, or perforation; no evidence of middle ear fluid or effusion.     Imaging:  The patient does not have any pertinent and/or recent imaging of the head and neck.     Labs:  CBC  Recent Labs   Lab 01/03/23  0849 09/28/23  0915 09/13/24  1214   WBC 8.80 8.28 10.70   Hemoglobin 12.3 11.6 L 12.2   Hematocrit 38.7 36.7 L 37.3   MCV 90 92 89   Platelets 301 247 289     BMP  Recent Labs   Lab 01/03/23  0849 09/28/23  0915 09/13/24  1214   Glucose 100 95 79   Sodium 136 138 138   Potassium 4.0 4.1 3.9   Chloride 104 106 105   CO2 23 27 24   BUN 9 10 10   Creatinine 0.8 0.8 0.8   Calcium 9.2 8.9 9.5     Assessment  1. Bilateral impacted cerumen    2. Excessive cerumen in both ear canals  - Ambulatory referral/consult to ENT     Plan:  Cerumen Impaction:  We discussed preventative measures and treatment options. Q-tips must be avoided, instead the ears can be cleaned with OTC ear rinses (or Debrox).   Discussed plan of care with patient in detail and all questions answered. Patient reported understanding of plan of care.

## 2024-10-23 DIAGNOSIS — Z00.00 ROUTINE MEDICAL EXAM: ICD-10-CM

## 2024-10-23 DIAGNOSIS — E78.00 HYPERCHOLESTEROLEMIA: ICD-10-CM

## 2024-10-23 RX ORDER — ROSUVASTATIN CALCIUM 20 MG/1
20 TABLET, COATED ORAL
Qty: 90 TABLET | Refills: 3 | Status: SHIPPED | OUTPATIENT
Start: 2024-10-23

## 2024-10-24 ENCOUNTER — OFFICE VISIT (OUTPATIENT)
Dept: SLEEP MEDICINE | Facility: CLINIC | Age: 44
End: 2024-10-24
Payer: COMMERCIAL

## 2024-10-24 VITALS
WEIGHT: 218.94 LBS | SYSTOLIC BLOOD PRESSURE: 141 MMHG | BODY MASS INDEX: 35.19 KG/M2 | HEART RATE: 76 BPM | DIASTOLIC BLOOD PRESSURE: 91 MMHG | HEIGHT: 66 IN

## 2024-10-24 DIAGNOSIS — R40.0 SOMNOLENCE: ICD-10-CM

## 2024-10-24 DIAGNOSIS — G47.33 OSA (OBSTRUCTIVE SLEEP APNEA): Primary | ICD-10-CM

## 2024-10-24 PROCEDURE — 1159F MED LIST DOCD IN RCRD: CPT | Mod: CPTII,S$GLB,, | Performed by: INTERNAL MEDICINE

## 2024-10-24 PROCEDURE — 3077F SYST BP >= 140 MM HG: CPT | Mod: CPTII,S$GLB,, | Performed by: INTERNAL MEDICINE

## 2024-10-24 PROCEDURE — 3008F BODY MASS INDEX DOCD: CPT | Mod: CPTII,S$GLB,, | Performed by: INTERNAL MEDICINE

## 2024-10-24 PROCEDURE — 99214 OFFICE O/P EST MOD 30 MIN: CPT | Mod: S$GLB,,, | Performed by: INTERNAL MEDICINE

## 2024-10-24 PROCEDURE — 99999 PR PBB SHADOW E&M-EST. PATIENT-LVL III: CPT | Mod: PBBFAC,,, | Performed by: INTERNAL MEDICINE

## 2024-10-24 PROCEDURE — 3044F HG A1C LEVEL LT 7.0%: CPT | Mod: CPTII,S$GLB,, | Performed by: INTERNAL MEDICINE

## 2024-10-24 PROCEDURE — 3080F DIAST BP >= 90 MM HG: CPT | Mod: CPTII,S$GLB,, | Performed by: INTERNAL MEDICINE

## 2024-10-24 NOTE — PROGRESS NOTES
"  ESTABLISHED PATIENT VISIT    Francis Stafford  is a pleasant 43 y.o. female established with the [facility]    LOV: "Dr. Schuler 12.27.21    Here today for:  follow-up     Since last visit:   See assessment below    Past Medical History:   Diagnosis Date    Anxiety     Depression     Generalized anxiety disorder with panic attacks 04/21/2020    Hx of psychiatric care     Moderate episode of recurrent major depressive disorder 04/21/2020    Psychiatric problem     Tension headache 06/04/2018    Therapy      Patient Active Problem List   Diagnosis    Obesity, Class I, BMI 30-34.9    Anxiety and depression    Vitamin D deficiency    Social anxiety disorder    Prediabetes       Current Outpatient Medications:     ergocalciferol, vitamin D2, (VITAMIN D ORAL), Take 2,000 Units by mouth Daily., Disp: , Rfl:     rosuvastatin (CRESTOR) 20 MG tablet, TAKE 1 TABLET(20 MG) BY MOUTH EVERY DAY, Disp: 90 tablet, Rfl: 3    venlafaxine (EFFEXOR-XR) 150 MG Cp24, Take 1 capsule (150 mg total) by mouth once daily., Disp: 90 capsule, Rfl: 3    venlafaxine (EFFEXOR-XR) 75 MG 24 hr capsule, Take 1 capsule (75 mg total) by mouth once daily., Disp: 30 capsule, Rfl: 11       Vitals:    10/24/24 1602   BP: (!) 141/91   BP Location: Right arm   Patient Position: Sitting   Pulse: 76   Weight: 99.3 kg (218 lb 14.7 oz)   Height: 5' 6" (1.676 m)     Physical Exam:    GEN:   Well-appearing  Psych:  Appropriate affect, demonstrates insight  SKIN:  No rash on the face or bridge of the nose      LABS:   Lab Results   Component Value Date    HGB 12.2 09/13/2024    CO2 24 09/13/2024         RECORDS REVIEWED:        ASSESSMENT    Sig PMH:elevated BMI, pre DM  PROBLEM DESCRIPTION/ Sx on Presentation Interval Hx STATUS PLAN     JUNG   Presentation:   + continued snoring, + witnessed  apneas     HST 1.6.22: AHI 1, RDI 9  ("no sig JUNG")    Still having sore throat   persists   -we discussed sleep testing to evaluate for JUNG     -discussed possible " treatments for JUNG including CPAP therapy       Daytime Sx     + sleepiness when inactive   ESS 11/24 on intake      SLEEPINESS   Duration    Description    Testing    Usual TST 10+ hours   Max TST 16 hours   H/H Hx none   SP occasional   sleep atx denies   sleep Inert. severe   Naps unrefreshing   Cataplexy    ESS (intake) 11/24   Meds prior    Meds now      SLEEP SCHEDULE   Duration    Wind- down    Envmnt    CBTi    Meds prior    Meds now    Bed Time 10p-1AM   Lights out    Latency    Arousals    Back to sleep    Stim. ctrl    Wake time 8-10A (noon-2PM)   Caffeine    Naps None currently   Nocturia occasional   Work                 Still some sleepiness/ lethargy  Unrefreshing sleep   persists   -will reassess sleepiness after evaluation for JUNG    Consider MSLT if no improvement after JUNG eval    Other issues:     RTC:  will arrange RTC depending on results of sleep testing

## 2024-10-28 ENCOUNTER — PATIENT MESSAGE (OUTPATIENT)
Dept: PRIMARY CARE CLINIC | Facility: CLINIC | Age: 44
End: 2024-10-28
Payer: COMMERCIAL

## 2024-10-29 ENCOUNTER — TELEPHONE (OUTPATIENT)
Dept: PRIMARY CARE CLINIC | Facility: CLINIC | Age: 44
End: 2024-10-29
Payer: COMMERCIAL

## 2024-11-04 ENCOUNTER — TELEPHONE (OUTPATIENT)
Dept: SLEEP MEDICINE | Facility: OTHER | Age: 44
End: 2024-11-04
Payer: COMMERCIAL

## 2024-11-08 ENCOUNTER — PATIENT MESSAGE (OUTPATIENT)
Dept: SLEEP MEDICINE | Facility: CLINIC | Age: 44
End: 2024-11-08
Payer: COMMERCIAL

## 2024-12-20 RX ORDER — VENLAFAXINE HYDROCHLORIDE 150 MG/1
150 CAPSULE, EXTENDED RELEASE ORAL DAILY
Qty: 90 CAPSULE | Refills: 3 | OUTPATIENT
Start: 2024-12-20

## 2025-01-06 ENCOUNTER — OFFICE VISIT (OUTPATIENT)
Dept: OBSTETRICS AND GYNECOLOGY | Facility: CLINIC | Age: 45
End: 2025-01-06
Payer: COMMERCIAL

## 2025-01-06 VITALS
DIASTOLIC BLOOD PRESSURE: 83 MMHG | BODY MASS INDEX: 35.21 KG/M2 | SYSTOLIC BLOOD PRESSURE: 126 MMHG | WEIGHT: 218.13 LBS

## 2025-01-06 DIAGNOSIS — Z12.39 ENCOUNTER FOR SCREENING BREAST EXAMINATION: ICD-10-CM

## 2025-01-06 DIAGNOSIS — Z80.3 FAMILY HISTORY OF BREAST CANCER: ICD-10-CM

## 2025-01-06 DIAGNOSIS — Z01.419 ENCOUNTER FOR GYNECOLOGICAL EXAMINATION: Primary | ICD-10-CM

## 2025-01-06 DIAGNOSIS — Z12.4 ENCOUNTER FOR SCREENING FOR MALIGNANT NEOPLASM OF CERVIX: ICD-10-CM

## 2025-01-06 DIAGNOSIS — Z86.59 HISTORY OF DEPRESSION: ICD-10-CM

## 2025-01-06 PROCEDURE — 99999 PR PBB SHADOW E&M-EST. PATIENT-LVL III: CPT | Mod: PBBFAC,,, | Performed by: NURSE PRACTITIONER

## 2025-01-06 PROCEDURE — 88175 CYTOPATH C/V AUTO FLUID REDO: CPT | Performed by: STUDENT IN AN ORGANIZED HEALTH CARE EDUCATION/TRAINING PROGRAM

## 2025-01-06 PROCEDURE — 87624 HPV HI-RISK TYP POOLED RSLT: CPT | Performed by: NURSE PRACTITIONER

## 2025-01-06 NOTE — PROGRESS NOTES
HISTORY OF PRESENT ILLNESS:    Francis Stafford is a 44 y.o. female, , Patient's last menstrual period was 12/15/2024 (approximate).,  presents for a routine exam and has no complaints. Patient reports cycles occur every 4 weeks lasting 3 days using 2-3 pads per day. She denies any BTB. Pt denies breast, urinary or gyn issues. Hx of depression, on meds, pt denies SI/HI. Pt does not have a counselor/therapist, pen to referral; concerned about cost.     Last Pap: 2021 ASCUS/HPV negative  History of abnormal pap: 2021- ASCUS  Last Mammogram: 2024- Negative (BIRADS 1), TC scrore: 20.18%  Fam hx of breast or ovarian cancer: Maternal grandmother (breast)  Last Colonoscopy: N/A    She uses condoms/abstinent for birth control.   She does not desire STD screening.    The patient participates in regular exercise: yes.    The patient does not smoke.    The patient wears seatbelts.     Pt denies any domestic violence.    Past Medical History:   Diagnosis Date    Anxiety     Depression     Generalized anxiety disorder with panic attacks 2020    Hx of psychiatric care     Moderate episode of recurrent major depressive disorder 2020    Psychiatric problem     Tension headache 2018    Therapy      Past Surgical History:   Procedure Laterality Date    LAPAROSCOPIC CHOLECYSTECTOMY N/A 2022    Procedure: CHOLECYSTECTOMY, LAPAROSCOPIC;  Surgeon: Cam Cisneros MD;  Location: [facility] OR [address] [place] [address];  Service: General;  Laterality: N/A;    WISDOM TOOTH EXTRACTION       MEDICATIONS AND ALLERGIES:    Current Outpatient Medications:     ergocalciferol, vitamin D2, (VITAMIN D ORAL), Take 2,000 Units by mouth Daily., Disp: , Rfl:     rosuvastatin (CRESTOR) 20 MG tablet, TAKE 1 TABLET(20 MG) BY MOUTH EVERY DAY, Disp: 90 tablet, Rfl: 3    venlafaxine (EFFEXOR-XR) 150 MG Cp24, Take 1 capsule (150 mg total) by mouth once daily., Disp: 90 capsule, Rfl: 3    venlafaxine (EFFEXOR-XR) 75 MG 24 hr capsule,  Take 1 capsule (75 mg total) by mouth once daily., Disp: 30 capsule, Rfl: 11    Review of patient's allergies indicates:  No Known Allergies    Family History   Problem Relation Name Age of Onset    No Known Problems Mother      No Known Problems Father      No Known Problems Sister Qian     No Known Problems Brother Stevie     No Known Problems Brother Maldonado     Cancer Maternal Grandmother      Breast cancer Maternal Grandmother      Heart disease Maternal Grandfather      No Known Problems Paternal Grandmother      No Known Problems Paternal Grandfather       Social History     Socioeconomic History    Marital status: Single    Number of children: 0   Tobacco Use    Smoking status: Former     Current packs/day: 0.00     Types: Cigarettes     Quit date:      Years since quittin.0    Smokeless tobacco: Never   Substance and Sexual Activity    Alcohol use: Yes     Alcohol/week: 1.0 standard drink of alcohol     Types: 1 Standard drinks or equivalent per week     Comment: rarely    Drug use: No    Sexual activity: Not Currently     Partners: Male     Birth control/protection: Condom   Social History Narrative    No smokers in household, 2 cats.     Social Drivers of Health     Financial Resource Strain: Low Risk  (2024)    Overall Financial Resource Strain (CARDIA)     Difficulty of Paying Living Expenses: Not hard at all   Food Insecurity: No Food Insecurity (2024)    Hunger Vital Sign     Worried About Running Out of Food in the Last Year: Never true     Ran Out of Food in the Last Year: Never true   Transportation Needs: No Transportation Needs (2023)    PRAPARE - Transportation     Lack of Transportation (Medical): No     Lack of Transportation (Non-Medical): No   Physical Activity: Insufficiently Active (2024)    Exercise Vital Sign     Days of Exercise per Week: 3 days     Minutes of Exercise per Session: 30 min   Stress: Stress Concern Present (2024)    [employer]  [employer] - Occupational Stress Questionnaire     Feeling of Stress : To some extent   Housing Stability: Low Risk  (9/27/2023)    Housing Stability Vital Sign     Unable to Pay for Housing in the Last Year: No     Number of Places Lived in the Last Year: 1     Unstable Housing in the Last Year: No     COMPREHENSIVE GYN HISTORY:  PAP History: Denies abnormal Paps.  Infection History: Denies STDs. Denies PID.  Benign History: Denies uterine fibroids. Denies ovarian cysts. Denies endometriosis. Denies other conditions.  Cancer History: Denies cervical cancer. Denies uterine cancer or hyperplasia. Denies ovarian cancer. Denies vulvar cancer or pre-cancer. Denies vaginal cancer or pre-cancer. Denies breast cancer. Denies colon cancer.  Sexual Activity History: Reports currently being sexually active  Menstrual History: Normal flow  Dysmenorrhea History: No    ROS:  GENERAL: No weight changes. No swelling. No fatigue. No fever. Depressive symptoms  CARDIOVASCULAR: No chest pain. No shortness of breath. No leg cramps.   NEUROLOGICAL: No headaches. No vision changes.  BREASTS: No pain. No lumps. No discharge.  ABDOMEN: No pain. No nausea. No vomiting. No diarrhea. No constipation.  REPRODUCTIVE: No abnormal bleeding.   VULVA: No pain. No lesions. No itching.  VAGINA: No relaxation. No itching. No odor. No discharge. No lesions.  URINARY: No incontinence. No nocturia. No frequency. No dysuria.    /83 (BP Location: Left arm, Patient Position: Sitting)   Wt 98.9 kg (218 lb 2.3 oz)   LMP 12/15/2024 (Approximate)   BMI 35.21 kg/m²     PE:  APPEARANCE: Well nourished, well developed, obese, in no acute distress.  AFFECT: WNL, alert and oriented x 3.  SKIN: No acne or hirsutism.  NECK: Neck symmetric, without masses or thyromegaly.  NODES: No inguinal, cervical, axillary or femoral lymph node enlargement.  CHEST: Good respiratory effort.   ABDOMEN: Soft. No tenderness or masses. No hepatosplenomegaly. No  hernias.  BREASTS: Symmetrical, no skin changes, visible lesions, palpable masses or nipple discharge bilaterally.  PELVIC: External female genitalia without lesions.  Female hair distribution. Adequate perineal body, Normal urethral meatus. Vagina moist and well rugated without lesions or discharge.  No significant cystocele or rectocele present. Cervix pink without lesions, discharge or tenderness. Uterus is normal, mobile and nontender. Adnexa without masses or tenderness. PAP SMEAR PERFORMED TODAY  EXTREMITIES: No edema    DIAGNOSIS:  1. Encounter for gynecological examination    2. Encounter for screening breast examination    3. Encounter for screening for malignant neoplasm of cervix    4. Family history of breast cancer    5. History of depression      PLAN:  -Pap smear performed today, A.C.S. pap guidelines discussed (every 3 yrs with hx of normal paps).   -CBE performed today. Mammogram up-to-date; another one scheduled by another provider. Recommend CBE yearly and encouraged breast self-exam/awareness. Pt verbalized understanding.  -BC: abstinent/condoms  -Fam hx of breast cancer: TC score elevated, sent referral to breast high risk clinic for further evaluation/management  -Depression: sent referral to Mental Health, advised pt to seek ER care if thoughts of SI/HI; pt verbalized understanding    COUNSELING:  A.C.S. pap guidelines discussed (every 3 yrs with hx of normal paps). Recommend yearly mammograms.     FOLLOW-UP with me annually and follow up with PCP for other health maintenance.    Ethan Luis, GIDEON, RN, APRN, WHNP-BC [facility]  Ob/Gyn Department- [facility]

## 2025-01-11 LAB
FINAL PATHOLOGIC DIAGNOSIS: ABNORMAL
Lab: ABNORMAL

## 2025-02-12 ENCOUNTER — TELEPHONE (OUTPATIENT)
Dept: SLEEP MEDICINE | Facility: OTHER | Age: 45
End: 2025-02-12
Payer: COMMERCIAL

## 2025-02-26 ENCOUNTER — TELEPHONE (OUTPATIENT)
Dept: HEMATOLOGY/ONCOLOGY | Facility: CLINIC | Age: 45
End: 2025-02-26
Payer: COMMERCIAL

## 2025-02-26 NOTE — TELEPHONE ENCOUNTER
----- Message from Mandi sent at 2/26/2025 12:52 PM CST -----  Good afternoon,Patient is requesting a call back to reschedule her appointment scheduled for 3/20.Thank you,Mandi DAMONOncology Molly

## 2025-02-26 NOTE — TELEPHONE ENCOUNTER
Reached out to the patient and she answered. Patient wanted to reschedule TC appointment with Princess. Appointment was rescheduled. Date and time confirmed with the patient.

## 2025-04-11 ENCOUNTER — HOSPITAL ENCOUNTER (OUTPATIENT)
Dept: RADIOLOGY | Facility: HOSPITAL | Age: 45
Discharge: HOME | End: 2025-04-11
Attending: NURSE PRACTITIONER
Payer: COMMERCIAL

## 2025-04-11 DIAGNOSIS — Z12.31 ENCOUNTER FOR SCREENING MAMMOGRAM FOR BREAST CANCER: ICD-10-CM

## 2025-04-11 PROCEDURE — 77067 SCR MAMMO BI INCL CAD: CPT | Mod: 26,,, | Performed by: RADIOLOGY

## 2025-04-11 PROCEDURE — 77063 BREAST TOMOSYNTHESIS BI: CPT | Mod: 26,,, | Performed by: RADIOLOGY

## 2025-04-11 PROCEDURE — 77067 SCR MAMMO BI INCL CAD: CPT | Mod: TC

## 2025-04-14 ENCOUNTER — RESULTS FOLLOW-UP (OUTPATIENT)
Dept: PRIMARY CARE CLINIC | Facility: CLINIC | Age: 45
End: 2025-04-14

## 2025-05-15 ENCOUNTER — PATIENT MESSAGE (OUTPATIENT)
Dept: SLEEP MEDICINE | Facility: CLINIC | Age: 45
End: 2025-05-15
Payer: COMMERCIAL

## 2025-05-23 ENCOUNTER — OFFICE VISIT (OUTPATIENT)
Dept: HEMATOLOGY/ONCOLOGY | Facility: CLINIC | Age: 45
End: 2025-05-23
Payer: COMMERCIAL

## 2025-05-23 VITALS
HEART RATE: 76 BPM | SYSTOLIC BLOOD PRESSURE: 133 MMHG | WEIGHT: 221.88 LBS | DIASTOLIC BLOOD PRESSURE: 84 MMHG | HEIGHT: 66 IN | OXYGEN SATURATION: 95 % | BODY MASS INDEX: 35.66 KG/M2

## 2025-05-23 DIAGNOSIS — Z91.89 AT HIGH RISK FOR BREAST CANCER: ICD-10-CM

## 2025-05-23 DIAGNOSIS — E66.811 OBESITY, CLASS I, BMI 30-34.9: Primary | ICD-10-CM

## 2025-05-23 DIAGNOSIS — Z80.3 FAMILY HISTORY OF BREAST CANCER: ICD-10-CM

## 2025-05-23 DIAGNOSIS — R92.343 EXTREMELY DENSE TISSUE OF BOTH BREASTS ON MAMMOGRAPHY: ICD-10-CM

## 2025-05-23 DIAGNOSIS — Z12.31 ENCOUNTER FOR SCREENING MAMMOGRAM FOR MALIGNANT NEOPLASM OF BREAST: ICD-10-CM

## 2025-05-23 PROCEDURE — 99999 PR PBB SHADOW E&M-EST. PATIENT-LVL IV: CPT | Mod: PBBFAC,,, | Performed by: NURSE PRACTITIONER

## 2025-05-23 NOTE — PROGRESS NOTES
"High Risk Breast [facility] note    Reason For Consultation:   high-risk for breast cancer    Referring Provider:   Ethan Luis, NP  [address] [place] [address]    Records Obtained: Records of the patients history including those obtained from the referring provider were reviewed and summarized in detail.    HPI:   Francis Stafford is a 44 y.o. who presents for consultation of increased risk of breast cancer.      Today, Feels good and no complaints.   No breast concerns.    High Risk Breast cancer specific history:  - Age: 44 y.o.   - Height/Weight:  Estimated body surface area is 2.17 meters squared as calculated from the following:    Height as of this encounter: 5' 6" (1.676 m).    Weight as of this encounter: 100.7 kg (221 lb 14.3 oz).  - Body mass index is 35.81 kg/m².  - Breast density per BI-RADS:   Extremely dense   - Age at menarche:   12  - Number of pregnancies: L0; age of first live birth:  0  - History of breast feeding:    N/A  -Uterus and ovaries intact: Yes  - Menopausal status: premenopausal. LMP 2025  - HRT: No  - Genetic testing:  No; She will think about this   - Personal history of cancer: No  - Previous chest radiation exposure between ages 10-30 years old: No  - Personal history of breast biopsy:  No  - Ashkenazi Roman [employer] Inheritance:  Yes Other   - Family history of cancer:    Cancer-related family history includes Breast cancer in her maternal grandmother; Cancer in her maternal grandmother.    Social History   Social History     Tobacco Use    Smoking status: Former     Current packs/day: 0.00     Types: Cigarettes     Quit date:      Years since quittin.4    Smokeless tobacco: Never   Substance Use Topics    Alcohol use: Yes     Alcohol/week: 1.0 standard drink of alcohol     Types: 1 Standard drinks or equivalent per week     Comment: rarely     Exercise regimen: Yes she walks 3 days per week  Patient's occupation: Data Unavailable.   Networked " "reference to record EEP 1000[[employer]     SEE CALCULATED RISK BELOW.     Past Medical   Past Medical History:   Diagnosis Date    Anxiety     Depression     Generalized anxiety disorder with panic attacks 04/21/2020    Hx of psychiatric care     Moderate episode of recurrent major depressive disorder 04/21/2020    Psychiatric problem     Tension headache 06/04/2018    Therapy    Problem List[1]  Family History  Family History   Problem Relation Name Age of Onset    No Known Problems Mother      No Known Problems Father      No Known Problems Sister Qian     No Known Problems Brother Stevie     No Known Problems Brother Maldonado     Cancer Maternal Grandmother      Breast cancer Maternal Grandmother      Heart disease Maternal Grandfather      No Known Problems Paternal Grandmother      No Known Problems Paternal Grandfather       Medications  Current Medications[2]  Allergies  Review of patient's allergies indicates:  No Known Allergies    Review of Systems       See above   All other systems reviewed and are negative.    Objective:      Vitals:   Vitals:    05/23/25 1021   Weight: 100.7 kg (221 lb 14.3 oz)   Height: 5' 6" (1.676 m)     BMI: Body mass index is 35.81 kg/m².   Body surface area is 2.17 meters squared.    Physical Exam  Vitals signs reviewed.   Constitutional:  Normal appearance. NAD.   HENT: Normocephalic.   Eyes: Pupils are equal, round, and reactive to light.   Cardiovascular: Normal rate.   Pulmonary: Pulmonary effort is normal.   Musculoskeletal: Normal range of motion.   Lymphadenopathy: No cervical adenopathy. No axillary adenopathy.   Skin: Skin is warm and dry.   Neurological:  Alert and oriented to person, place, and time.   Psychiatric: Mood normal.     Breast Exam: Bilateral breast normal. No masses, no tenderness, no skin or nipple abnormalities.  No lymphadenopathy.       Laboratory Data: reviewed most recent   Imaging: reviewed most recent      General Education discussed:      1 in 11 " women diagnosed with breast cancer in the [place] were under 46yo.       Individuals should undergo breast cancer risk assessment by age 25 years and be counseled regarding potential benefits, risks, and limitations of breast screening in the context of their risk stratification.       1. General education: (not patient specific)    Risk factors associated with breast cancer are categorized into 2 groups: Modifiable and Non-modifiable. Modifiable risk factors include use of hormones, alcohol, smoking, diet and exercise. Non-modifiable risk factors include breast density, genetics, chest radiation, previous pregnancies, age of first period, and age of menopause.     Factors associated with greater breast cancer risk: (this list is not patient specific)  -Increasing age The risk of breast cancer increases with older age.  -Female sex  -White race (In the [place], the highest breast cancer risk occurs among White women, although breast cancer remains the most common cancer among women of every major ethnic/racial group )  -Weight and body fat in postmenopausal women -Obesity (defined as body mass index [BMI] >=30 kg/m2) is associated with an overall increase  in morbidity and mortality. However, the risk of breast cancer associated with BMI differs by menopausal status.   ?Postmenopausal women - A higher BMI and/or perimenopausal weight gain have been consistently associated with a higher risk of breast cancer among postmenopausal women. The association between a higher BMI and postmenopausal breast cancer risk may be mediated by higher estrogen levels resulting from the peripheral conversion of estrogen precursors (from adipose tissue) to estrogen   -Tall stature -women who were >175 cm (69 inches) tall were 20 percent more likely to develop breast cancer than those <160 cm (63 inches) tall.  -Benign breast disease  Benign breast disease represents a spectrum of disorders that come to clinical attention  because of patient symptoms (such as breast pain), palpable lesions or other findings on physical examination, or as imaging abnormalities. Following establishment of a benign diagnosis, treatment in general is aimed at symptomatic relief and patient education.  Some benign breast diseases, such as atypical hyperplasia or lobular carcinoma in situ, confer an increase in the patient's future risk of developing breast cancer and should lead to counseling about screening recommendations and risk reduction strategies. These lesions are considered as risk markers, rather than as premalignant lesions, because those cancers that subsequently develop are not necessarily in the area of the atypia and may occur in the contralateral breast.  Benign epithelial breast lesions can be classified histologically into three categories: nonproliferative, proliferative without atypia, and atypical hyperplasia. The categorization is based upon the degree of cellular proliferation and atypia.  -Dense breast tissue -- The density of breast tissue reflects the relative amount of glandular and connective tissue (parenchyma) to adipose tissue. Women with mammographically dense breast tissue, generally defined as dense tissue comprising >=75 percent of the breast, have a four to five times higher breast cancer risk compared with women of similar age with less or no dense tissue. Although breast density is a largely inherited trait, other factors can influence density. For example, lower density has been associated with higher levels of physical activity  and with a low-fat, high-carbohydrate diet. In postmenopausal women, estrogen and progesterone increase breast density  while the ER antagonist tamoxifen decreases breast density.  Despite the association of exogenous hormones with breast density, breast density is not strongly correlated with endogenous hormone levels. Breast tend to become more fatty with age.   Dense breasts -  occurring  in an estimated 50% of women in their 40s, 40% in their 50s, and 25% of women older than 60. Density can make it more difficult to detect breast cancer and increases breast cancer risk, both of which suggest that additional imaging can improve early diagnosis of the disease.   -Bone mineral density -In multiple studies, women with higher bone density have a higher breast cancer risk  -Hormonal factors:   Of note, IVF does not appear to increase the long-term risk of breast cancer, even in women with BRCA 1 and 2 mutations.     With regard to Breast cancer -- combined oral contraceptives (DILLAN)  appear to be associated with little to no increased risk of breast cancer based on observational data. Any effect appears to be temporary and limited to current or recent (within five to seven years) DILLAN use. I will put a link here for  review:  Combined estrogen-progestin contraception: Side effects and health concerns - UpToDate     HRT known data:   ----The Women's Health Initiative (WHI) demonstrated adverse effects of menopausal hormone therapy (MHT) in older postmenopausal women (over age 60 years or more than 10 years since menopause)   In the Women's Health Initiative (WHI), the risk of invasive breast cancer was significantly increased with combined hormone therapy (HT) at an average follow-up of 5.6 years.     ---A 2019 meta-analysis of all available epidemiologic evidence on the association between menopausal hormone therapy (MHT) use and breast cancer risk has been published. The analysis included nearly 145,000 women with breast cancer (51 percent of whom had used MHT) and nearly 425,000 without breast cancer. Their findings included:  ?Similar to the WHI, estrogen-progestin regimens were associated with excess breast cancer risk. An excess risk was also seen with estrogen-only regimens (a reduction in risk was seen in the WHI). There was no excess risk with vaginal estrogens.   ?Breast cancer risk increased with  the duration of systemic MHT use. Unlike previous studies, obesity was not associated with excess risk; instead, it attenuated risk.  ?The authors of the study calculated that for women of average weight, five years of MHT use starting at age 50 years would increase their 20-year risk of breast cancer (between the ages of 50 and 69 years) by approximately:  One in every 50 users of estrogen plus daily progestin  One in every 70 users of estrogen plus intermittent progestin   One in every 200 users of estrogen-only regimens   Of note: There were important limitations in this study.   While this meta-analysis has renewed concerns for some about the association between MHT and breast cancer, we continue to suggest an individualized approach when counseling symptomatic postmenopausal women about treatment. This includes putting the potential risk of breast cancer (and cardiovascular disease) in the context of the benefits of MHT (eg, relief of vasomotor symptoms, improved sleep and quality of life, and prevention of bone loss)      ---(ESTIMATES OF RISK IN WOMEN 50 TO 59 YEARS):  Although most available HT clinical trial data are from women over age 60 years, estimates of the absolute risks and benefits for women starting MHT in their 50s has been published. The intervention phase of the [facility] was the source of data for the analysis. Data were expressed as the attributable excess risk or benefit for five years of combined estrogen-progestin or unopposed estrogen use in women starting treatment between ages 50 to 59 years. Overall, the risk-benefit profile was more favorable for women ages 50 to 59 compared with older women.  ?Combined estrogen-progestin therapy - Number of cases (additional or fewer) per 1000 women per five years of hormone use when compared with placebo:  Coronary heart disease (CHD) - 2.5 additional cases  Invasive breast cancer - 3 additional cases  Stroke - 2.5 additional cases  Pulmonary embolism - 3  additional cases  Colorectal cancer - 0.5 fewer cases  Endometrial cancer - No difference  Hip fracture - 1.5 fewer cases  All-cause mortality - 5 fewer events  Estrogen-alone therapy - Number of cases (additional or fewer) per 1000 women per five years of hormone use when compared with placebo  CHD - 5.5 fewer cases  Invasive breast cancer - 2.5 fewer cases  Stroke - 0.5 fewer cases  Pulmonary embolism - 1.5 additional cases  Colorectal cancer - 0.5 fewer cases  Hip fracture - 1.5 additional cases (of note, there was an overall decrease in all osteoporotic fractures in both the estrogen and combined estrogen-progestin groups)  All-cause mortality - 5.5 fewer events    This analysis highlights that the overall risks of HT in younger postmenopausal women are considerably lower than those for older women (eg, women in the WHI). The major explanation for the difference in absolute excess risk between older and younger postmenopausal women is the lower baseline risk of CHD, stroke, venous thromboembolism (VTE), and breast cancer in younger postmenopausal women.  Menopausal hormone therapy: Benefits and risks - UpToDate         -Reproductive factors -Earlier menarche (before age 12) or later menopause (after age 52), Nulliparity, Increasing age at first full-term pregnancy.   (Of note, It is estimated that for every 12 months of breastfeeding, there was a 4.3 percent reduction in the relative risk (RR) of breast cancer)  -Personal and family history of breast cancer  -Alcohol use and smoking  -Exposure to therapeutic ionizing radiation           2. Risk stratifying models:  There are several models available for stratifying breast cancer risk, and Wesley-Halinack is presently the model utilized by [facility] and is a model recommended per current NCCN guidelines.      Educational videos:   What Is a TC Score?    https://youtu.be/Elnl9FVPobF     What If I Have a High-Risk TC Score?      https://youtu.be/zPz2cJv3b6S      The Michela Model for Breast Cancer risk estimates the absolute 5 year risk and lifetime risk of developing breast cancer. Family history includes only first degree relatives with breast cancer, which is not enough information to estimate the risk of a patient having BRCA mutation. It also underestimates the cancer risk for patients with extensive family history. The Michela Model is a good predictor of risk for populations but not for individuals. It adjusts risk for race/ethnicity. It may underestimate breast cancer risk in patients with atypical hyperplasia and strong family history. The Michela Model was NOT designed to estimate risk for: Women with a prior diagnosis of breast cancer, lobular carcinoma in situ (LCIS), or ductal carcinoma in situ (DCIS);  Women who have received previous radiation therapy to the chest for treatment of Hodgkin lymphoma;  Women with gene mutations in BRCA1 or BRCA2, or those who are known to have certain genetic syndromes that increase risk for breast cancer; Women of age <35 or >85.    There are limitations to every model for risk assessment, particularly that TC can overestimate risk in women with atypical hyperplasia and dense breasts and that Michela underestimates risk for those with a strong family history of breast or ovarian cancers as well as non-white women with atypical hyperplasia which can make them appear to not be candidates for risk reducing therapies.         3. High risk patients:       INCREASED RISK SCREENING: per NCCN                      MRI breast:   The use of MRI for breast cancer detection is based on the concept of  tumor angiogenesis or neovascularity. Tumor-associated blood vessels have increased permeability, which leads to prompt uptake and release of gadolinium within the first one to two minutes after administration, leading to a pattern of rapid enhancement and washout on MRI.   Bilateral breast examination - Both breasts  should be evaluated in an MRI study, for comparison purposes, even when concern about possible pathology involves only one breast.  Contrast - Intravenous gadolinium contrast must be used to maximize cancer detection and is administered before breast MRI to highlight the neovascularity associated with cancers. Contrast is not necessary when the study is performed to evaluate silicone implant integrity.  Allergic and anaphylactoid reactions to gadolinium are rare, but can occur. In addition, in patients with renal failure, gadolinium can cause contrast nephropathy and/or nephrogenic systemic fibrosis.   A few studies have also reported gadolinium deposition in the brain from repeated intravenous administration, with the degree of deposition varying based on the specific contrast agent. The clinical significance of this deposition remains unknown, and no data for humans exist to show any adverse effects or harm at this time.     FDA Drug Safety Communication: FDA identifies no harmful effects to date with brain retention of gadolinium-based contrast agents for MRIs;   review to continue: https:// www.fda.gov/Drugs/DrugSafety/ziq515184.htm    -Contact insurance company with regards to coverage of MRI breasts.   -Cannot undergo an MRI if pregnant.   -MRI's may have false positives                 LEYDI (contrast enhanced mammogram):  LEYDI is the main alternative for anyone who benefits by but cannot have a breast MRI with IV contrast.    Main indications:   High risk screening   Suspicious clinical symptoms with inconclusive mammogram and ultrasound   New breast cancer, evaluate extent of disease   Pre and post gladis-adjuvant systemic therapy evaluation     For high-risk screening, LEYDI replaces the regular non-contrast mammogram and any other supplemental test         For age over 75 years, screening recommendations are considered on an individual basis.       ACR guidelines:    Note: New American College of Radiology®  (ACR®) breast cancer screening guidelines  now call for all women -- particularly Black and Ashkenazi Sabianism women -- to have risk assessment by age 25 to determine if screening earlier than age 40 is needed. The ACR continues to recommend annual screening starting at age 40 for women of average risk, but earlier and more intensive screening for high-risk patients. The new ACR guidelines  for high-risk women were published online May 3 in the Journal of the American College of Radiology (JACR ).      Early detection decreases breast cancer death. The ACR recommends annual screening beginning at age 40 for women of average risk and earlier and/or more intensive screening for women at higher-than-average risk. For most women at higher-than-average risk, the supplemental screening method of choice is breast MRI. Women with genetics-based increased risk, those with a calculated lifetime risk of 20% or more, and those exposed to chest radiation at young ages are recommended to undergo MRI surveillance starting at ages 25 to 30 and annual mammography (with a variable starting age between 25 and 40, depending on the type of risk). Mutation carriers can delay mammographic screening until age 40 if annual screening breast MRI is performed as recommended. Women diagnosed with breast cancer before age 50 or with personal histories of breast cancer and dense breasts should undergo annual supplemental breast MRI. Others with personal histories, and those with atypia at biopsy, should strongly consider MRI screening, especially if other risk factors are present. For women with dense breasts who desire supplemental screening, breast MRI is recommended. For those who qualify for but cannot undergo breast MRI, contrast-enhanced mammography or ultrasound could be considered. All women should undergo risk assessment by age 25, especially Black women and women of Ashkenazi Sabianism heritage, so that those at higher-than-average risk can  be identified and appropriate screening initiated.      -RECOMMENDED LIFESTYLE MODIFICATIONS FOR HIGH RISK PATIENTS:    * Reviewed Lifestyle modifications which have shown benefit:  Limit alcohol consumption to less than 1 drink per day (1 ounce liquor, 6 oz wine, 8 oz beer) and nor more than 3 drinks per week.   Avoid smoking.  Exercise at least 150 minutes per week of moderate intensity aerobic activity or at least 75 minutes of vigorous activity. Exercise can lower the relative risk of breast cancer by ~18-20%.  Maintain healthy weight and avoid post-menopausal weight gain. Avoid processed foods and eat more lean proteins, fruits and vegetables.                               * Available resources include genetic counseling, nutrition, weight management.    -requirements for Bariatric surgery. BMI must be >40 with no comorbidities or 35> with at least two comorbidities pertaining obesity (Htn, type 2 diabetes, Guy, Osteoarthritis, and `  hyperlipidemia)          -CHEMOPREVENTION:                * For women at high risk for breast cancer, endocrine therapy can reduce the risk of invasive and/or in situ breast cancers. (tamoxifen for premenopausal or postmenopausal women and raloxifene or exemestane for postmenopausal women).   -Above have been shown to lower the risk of breast cancer incidence, however there is no survival benefit in patients who don't have breast cancer.        Tamoxifen:    Data regarding tamoxifen risk reduction are limited to pre- and postmenopausal individuals >=35 years of age with a Michela Model 5-year breast cancer risk of >=1.7% or a 10-year risk by TORRIE/Tyrer-Cuzicke of >=5% or a history of LCIS.  Tamoxifen: 20 mg per day for 5 years was shown to reduce risk of breast cancer by 49%. Among individuals with a history of AH, this dose and duration of tamoxifen were associated with an 86% reduction in breast cancer risk. Low-dose tamoxifen (5 mg per day for 3-5 years)d is an option if patient  is symptomatic on the 20-mg dose or if patient is unwilling or unable to take standard-dose tamoxifen.1 This low dosage needs further investigation in premenopausal individuals.  The efficacy of tamoxifen risk reduction in individuals who are carriers of BRCA1/2 and other pathogenic mutations is less well studied than in other risk groups. Limited data suggest there may be a benefit, likely a larger benefit, for BRCA2 carriers.  For healthy, premenopausal individuals at elevated risk for breast cancer, data regarding the risk/benefit ratio for tamoxifen appear relatively favorable (category 1).  For postmenopausal individuals at elevated risk for breast cancer, data regarding the risk/benefit ratio for tamoxifen are influenced by age, presence of uterus, or comorbid conditions (category 1). There are insufficient data on ethnicity and rac  -Risks of Tamoxifen side effects include hot flashes, invasive endometrial cancer in women > 49 years of age (2.3/1000 compared to 0.9/1000), cataracts, increased risk of pulmonary embolism among others.    Raloxifene:    Data regarding raloxifene risk reduction are limited to postmenopausal individuals >=35 years of age with a Michela Model 5-year breast cancer risk >=1.7% or a 10-year risk by TORRIE/Wesley-Ermelinda of >=5% or a history of LCIS.  Raloxifene: 60 mg per day was found to be equivalent to tamoxifen for breast cancer risk reduction in the initial comparison. While raloxifene in long-term follow-up appears to be less efficacious in risk reduction than tamoxifen, consideration of toxicity may still lead to the choice of raloxifene over tamoxifen in individuals with an intact uterus.  There are no data regarding the use of raloxifene in individuals who are carriers of BRCA1/2 and other pathogenic mutations or who have had prior thoracic radiation.  For postmenopausal individuals at elevated risk for breast cancer, data regarding the risk/benefit ratio for raloxifene are  influenced by age or comorbid conditions (category 1). There are insufficient data on ethnicity and race.  Use of raloxifene for breast cancer risk reduction in premenopausal individuals is inappropriate unless part of a clinical trial.     Aromatase Inhibitors (exemestane and anastrozole)   Data regarding exemestane are from a single large randomized study limited to postmenopausal individuals >=35 years of age with a Michela Model 5-year breast cancer risk >=1.7% or a 10-year risk by TORRIE/Tyrer-Cuzicke of >=5% or a history of LCIS.  Data regarding anastrozole are from a single large randomized study limited to postmenopausal individuals 40 to 70 years of age with the following risk compared with the general population: Aged 40 to 44 years - 4 times higher Aged 45 to 60 years - >=2 times higher Aged 60 to 70 years - >=1.5 times higher Individuals who did not meet these criteria but had a Tyrer-Cuzicke model 10-year breast cancer risk >5% were also included.  Exemestane: 25 mg per day was found to reduce the relative incidence of invasive breast cancer by 65% from 0.55% to 0.19% with a median follow-up of 3 years.  Anastrozole: 1 mg per day was found to reduce the relative incidence of breast cancer by 53% with a median follow-up of 5 years.  There are retrospective data that aromatase inhibitors can reduce the risk of contralateral breast cancer in BRCA1/2 patients with ER-positive breast cancer who take aromatase inhibitors as adjuvant agents.  For postmenopausal individuals at elevated risk for breast cancer, data regarding the risk/benefit ratio for aromatase inhibitor agents are influenced by age and comorbid conditions such as osteoporosis (category 1). There are insufficient data on ethnicity and race.  Use of aromatase inhibitors for breast cancer risk reduction in premenopausal individuals is inappropriate         Assessment:     1. Family history of breast cancer          Breast Cancer Risk Stratification    Current, Estimated Breast Cancer Risk Model Used Patient's Score  Patient's Risk Category   5-year Michela Model 1.5%   [] N/A given age <35   [x] Average risk (<1.7%)   [] Increased risk (>=1.7%)   10-year Tyrer-Cuzick v8.0b 7.74%   [] <5%   [x] >=5%    Lifetime (to age 85) Tyrer-Cuzick v8.0b 35.51%   [] Average risk (<15%)   [] Intermediate risk (>=15% - <20%)   [x] High risk (>=20%)   According to the [employer], patients with a lifetime breast cancer risk of 20% or higher might benefit from supplemental screening exams. Women with a 5-year risk greater than or equal to 1.7% may benefit from chemoprevention agents (tamoxifen, raloxifene, aromatase inhibitors) to reduce risk.      Plan:       Patient elects to proceed with contrast enhanced mmg.  She will alternate CBE here and with GYN/PCP. Last mammogram was 4/11/2025 she will be due for her contrast enhanced mmg 4/12/2026  Patient has opted out chemoprevention but will call in the future if she wishes to pursue.   Encourage breast awareness, including monthly breast self-exams.   Recommend lifestyle modifications as above.     RTC in 1 year to see me    Questions were encouraged and answered to patient's satisfaction, and patient verbalized understanding of information and agreement with the plan. Advised patient to RTC with any interval changes or concerns.      Route Chart for Scheduling    Med Onc Chart Routing      Follow up with physician    Follow up with LOUISE 1 year. Princess Hinton   Infusion scheduling note    Injection scheduling note    Labs   Scheduling:  Preferred lab:  Lab interval:  Creatinine Due 4/1/2026   Imaging Mammogram   [facility] 4/12/2026   Pharmacy appointment No pharmacy appointment needed      Other referrals No nutrition appointment needed -  No referral to Oncology Primary Care needed -  No massage appointment needed    No additional referrals needed                  Princess Hinton NP         30 minutes of total time spent  on the encounter, which includes face to face time and non-face to face time preparing to see the patient (eg, review of tests), Obtaining and/or reviewing separately obtained history, Documenting clinical information in the electronic or other health record, Independently interpreting results (not separately reported) and communicating results to the patient/family/caregiver, or Care coordination (not separately reported).         Answers submitted by the patient for this visit:  Review of Systems Questionnaire (Submitted on 5/16/2025)  appetite change : No  unexpected weight change: No  mouth sores: No  visual disturbance: No  cough: No  shortness of breath: No  chest pain: No  abdominal pain: No  diarrhea: No  frequency: No  back pain: Yes  rash: No  headaches: No  adenopathy: No  nervous/ anxious: Yes         [1]   Patient Active Problem List  Diagnosis    Obesity, Class I, BMI 30-34.9    Anxiety and depression    Vitamin D deficiency    Social anxiety disorder    Prediabetes   [2]   Current Outpatient Medications:     ergocalciferol, vitamin D2, (VITAMIN D ORAL), Take 2,000 Units by mouth Daily., Disp: , Rfl:     rosuvastatin (CRESTOR) 20 MG tablet, TAKE 1 TABLET(20 MG) BY MOUTH EVERY DAY, Disp: 90 tablet, Rfl: 3    venlafaxine (EFFEXOR-XR) 150 MG Cp24, Take 1 capsule (150 mg total) by mouth once daily., Disp: 90 capsule, Rfl: 3    venlafaxine (EFFEXOR-XR) 75 MG 24 hr capsule, Take 1 capsule (75 mg total) by mouth once daily., Disp: 30 capsule, Rfl: 11

## 2025-08-01 DIAGNOSIS — E78.00 HYPERCHOLESTEROLEMIA: ICD-10-CM

## 2025-08-01 DIAGNOSIS — Z00.00 ROUTINE MEDICAL EXAM: ICD-10-CM

## 2025-08-01 RX ORDER — ROSUVASTATIN CALCIUM 20 MG/1
TABLET, COATED ORAL
Qty: 90 TABLET | Refills: 0 | Status: SHIPPED | OUTPATIENT
Start: 2025-08-01

## (undated) DEVICE — TRAY MINOR GEN SURG

## (undated) DEVICE — SUT MCRYL PLUS 4-0 PS2 27IN

## (undated) DEVICE — SUT 0 VICRYL / UR6 (J603)

## (undated) DEVICE — HEMOSTAT SURGICEL NU-KNIT 6X9

## (undated) DEVICE — DRAPE CORETEMP FLD WRM 56X62IN

## (undated) DEVICE — TUBING HF INSUFFLATION W/ FLTR

## (undated) DEVICE — TROCAR ENDOPATH XCEL 5MM 7.5CM

## (undated) DEVICE — SOL NS 1000CC

## (undated) DEVICE — BLADE SURG CARBON STEEL SZ11

## (undated) DEVICE — NDL HYPO REG 25G X 1 1/2

## (undated) DEVICE — ELECTRODE REM PLYHSV RETURN 9

## (undated) DEVICE — IRRIGATOR ENDOSCOPY DISP.

## (undated) DEVICE — TOWEL OR DISP STRL BLUE 4/PK

## (undated) DEVICE — KIT ANTIFOG W/SPONG & FLUID

## (undated) DEVICE — CLIP HEMO-LOK ML

## (undated) DEVICE — TROCAR ENDOPATH XCEL 5X75MM

## (undated) DEVICE — SCISSOR 5MMX35CM DIRECT DRIVE

## (undated) DEVICE — TROCAR SPACEMAKER BLUNT 10MM

## (undated) DEVICE — BAG TISS RETRV MONARCH 10MM

## (undated) DEVICE — GLOVE GAMMEX SURG LF PI SZ 7.5

## (undated) DEVICE — DRAPE ABDOMINAL TIBURON 14X11

## (undated) DEVICE — ADHESIVE DERMABOND ADVANCED

## (undated) DEVICE — DRAPE STERI INSTRUMENT 1018